# Patient Record
Sex: FEMALE | Race: WHITE | Employment: UNEMPLOYED
[De-identification: names, ages, dates, MRNs, and addresses within clinical notes are randomized per-mention and may not be internally consistent; named-entity substitution may affect disease eponyms.]

---

## 2020-04-11 ENCOUNTER — NURSE TRIAGE (OUTPATIENT)
Dept: OTHER | Facility: CLINIC | Age: 30
End: 2020-04-11

## 2022-03-10 ENCOUNTER — OFFICE VISIT (OUTPATIENT)
Dept: PRIMARY CARE CLINIC | Age: 32
End: 2022-03-10
Payer: MEDICAID

## 2022-03-10 VITALS
HEART RATE: 121 BPM | SYSTOLIC BLOOD PRESSURE: 110 MMHG | HEIGHT: 64 IN | DIASTOLIC BLOOD PRESSURE: 73 MMHG | BODY MASS INDEX: 26.09 KG/M2 | OXYGEN SATURATION: 99 % | TEMPERATURE: 97.3 F

## 2022-03-10 DIAGNOSIS — Z79.899 POLYPHARMACY: ICD-10-CM

## 2022-03-10 DIAGNOSIS — M25.571 CHRONIC PAIN OF RIGHT ANKLE: ICD-10-CM

## 2022-03-10 DIAGNOSIS — G89.29 CHRONIC PAIN OF RIGHT ANKLE: ICD-10-CM

## 2022-03-10 DIAGNOSIS — G89.4 CHRONIC PAIN DISORDER: ICD-10-CM

## 2022-03-10 DIAGNOSIS — J45.20 MILD INTERMITTENT ASTHMA WITHOUT COMPLICATION: ICD-10-CM

## 2022-03-10 DIAGNOSIS — F90.9 ATTENTION DEFICIT HYPERACTIVITY DISORDER (ADHD), UNSPECIFIED ADHD TYPE: Primary | ICD-10-CM

## 2022-03-10 DIAGNOSIS — G40.909 SEIZURE DISORDER (HCC): ICD-10-CM

## 2022-03-10 DIAGNOSIS — F41.9 ANXIETY AND DEPRESSION: ICD-10-CM

## 2022-03-10 DIAGNOSIS — F32.A ANXIETY AND DEPRESSION: ICD-10-CM

## 2022-03-10 PROCEDURE — 99204 OFFICE O/P NEW MOD 45 MIN: CPT | Performed by: FAMILY MEDICINE

## 2022-03-10 RX ORDER — GABAPENTIN 800 MG/1
800 TABLET ORAL 3 TIMES DAILY
Qty: 90 TABLET | Refills: 2 | Status: SHIPPED | OUTPATIENT
Start: 2022-03-10 | End: 2022-06-13

## 2022-03-10 RX ORDER — TIZANIDINE HYDROCHLORIDE 6 MG/1
6 CAPSULE, GELATIN COATED ORAL DAILY PRN
Qty: 30 CAPSULE | Refills: 0 | Status: SHIPPED | OUTPATIENT
Start: 2022-03-10 | End: 2022-03-21

## 2022-03-10 RX ORDER — OXCARBAZEPINE 600 MG/1
600 TABLET, FILM COATED ORAL 2 TIMES DAILY
COMMUNITY
End: 2022-03-10 | Stop reason: SDUPTHER

## 2022-03-10 RX ORDER — HYDROCODONE BITARTRATE AND ACETAMINOPHEN 10; 325 MG/1; MG/1
1 TABLET ORAL 3 TIMES DAILY
COMMUNITY
End: 2022-04-18 | Stop reason: CLARIF

## 2022-03-10 RX ORDER — CITALOPRAM 40 MG/1
10 TABLET ORAL DAILY
COMMUNITY
End: 2022-04-18 | Stop reason: CLARIF

## 2022-03-10 RX ORDER — DEXTROAMPHETAMINE SACCHARATE, AMPHETAMINE ASPARTATE, DEXTROAMPHETAMINE SULFATE AND AMPHETAMINE SULFATE 3.75; 3.75; 3.75; 3.75 MG/1; MG/1; MG/1; MG/1
15 TABLET ORAL DAILY
COMMUNITY
End: 2022-03-21 | Stop reason: SDUPTHER

## 2022-03-10 RX ORDER — TIZANIDINE HYDROCHLORIDE 6 MG/1
6 CAPSULE, GELATIN COATED ORAL 3 TIMES DAILY
COMMUNITY
End: 2022-03-10 | Stop reason: SDUPTHER

## 2022-03-10 RX ORDER — HYDROXYZINE PAMOATE 25 MG/1
50 CAPSULE ORAL 3 TIMES DAILY PRN
COMMUNITY
End: 2022-06-30

## 2022-03-10 RX ORDER — DEXTROAMPHETAMINE SACCHARATE, AMPHETAMINE ASPARTATE MONOHYDRATE, DEXTROAMPHETAMINE SULFATE AND AMPHETAMINE SULFATE 7.5; 7.5; 7.5; 7.5 MG/1; MG/1; MG/1; MG/1
30 CAPSULE, EXTENDED RELEASE ORAL EVERY MORNING
Qty: 30 CAPSULE | Refills: 0 | Status: SHIPPED | OUTPATIENT
Start: 2022-03-10 | End: 2022-05-05

## 2022-03-10 RX ORDER — OXCARBAZEPINE 600 MG/1
600 TABLET, FILM COATED ORAL 2 TIMES DAILY
Qty: 60 TABLET | Refills: 2 | Status: SHIPPED | OUTPATIENT
Start: 2022-03-10 | End: 2022-05-24 | Stop reason: SDUPTHER

## 2022-03-10 RX ORDER — DEXTROAMPHETAMINE SACCHARATE, AMPHETAMINE ASPARTATE MONOHYDRATE, DEXTROAMPHETAMINE SULFATE AND AMPHETAMINE SULFATE 7.5; 7.5; 7.5; 7.5 MG/1; MG/1; MG/1; MG/1
30 CAPSULE, EXTENDED RELEASE ORAL EVERY MORNING
COMMUNITY
End: 2022-03-10 | Stop reason: SDUPTHER

## 2022-03-10 RX ORDER — LORAZEPAM 0.5 MG/1
0.5 TABLET ORAL EVERY 6 HOURS PRN
COMMUNITY
End: 2022-04-18 | Stop reason: CLARIF

## 2022-03-10 RX ORDER — EPINEPHRINE 0.3 MG/.3ML
0.3 INJECTION SUBCUTANEOUS ONCE
Qty: 0.3 ML | Refills: 0 | Status: SHIPPED | OUTPATIENT
Start: 2022-03-10 | End: 2022-06-13

## 2022-03-10 RX ORDER — GABAPENTIN 800 MG/1
800 TABLET ORAL 3 TIMES DAILY
COMMUNITY
End: 2022-03-10 | Stop reason: SDUPTHER

## 2022-03-10 RX ORDER — ALBUTEROL SULFATE 90 UG/1
2 AEROSOL, METERED RESPIRATORY (INHALATION) 4 TIMES DAILY PRN
Qty: 18 G | Refills: 5 | Status: SHIPPED | OUTPATIENT
Start: 2022-03-10

## 2022-03-10 ASSESSMENT — ENCOUNTER SYMPTOMS
COUGH: 0
BACK PAIN: 1

## 2022-03-10 NOTE — PROGRESS NOTES
[address] [facility] [address] PRIMARY CARE  [address] [place] [id number]  Dept: [phone]  Dept Fax: [phone]     3/10/2022      Darrick Hagen   1990     Chief Complaint   Patient presents with    Discuss Medications     NEW PATIENT       HPI    Just moved here from [place] with wife and 4 children. Patient reports a h/o ADHD, ASD, EDS, seizure disorder, depression, anxiety, chronic pain. Is on multiple medications and was following with PM, psych and neurologist.    DID NOT BRING ANY MEDICATION BOTTLES OR MEDICAL RECORDS TO VISIT TODAY. Has Ortho appt tomorrow - ankle surgery in October. \"Did not heal properly. \"     [facility]     - Adderall XR 30 mg QD and Adderall IR 15 mg every afternoon, hydroxyzine 25mg PRN, taking both citalopram 10mg  and sertraline 50mg with Lorazepam PRN. - States she will be out of Adderall on Saturday. Neurologist - [facility]  - dora    PM - [facility] [phone], Fax [phone]. - Hydrocodone (out of this), tizanidine, gabapentin    Also has asthma. Normally takes albuterol and flovent. Has not had these in a long time. Current Outpatient Medications on File Prior to Visit   Medication Sig Dispense Refill    sertraline (ZOLOFT) 50 MG tablet Take 75 mg by mouth daily      HYDROcodone-acetaminophen (NORCO)  MG per tablet Take 1 tablet by mouth 3 times daily.  amphetamine-dextroamphetamine (ADDERALL, 15MG,) 15 MG tablet Take 15 mg by mouth daily. afternoon      citalopram (CELEXA) 40 MG tablet Take 10 mg by mouth daily       hydrOXYzine (VISTARIL) 25 MG capsule Take 25 mg by mouth 3 times daily as needed for Itching      LORazepam (ATIVAN) 0.5 MG tablet Take 0.5 mg by mouth every 6 hours as needed for Anxiety.  PRAZOSIN HCL PO Take by mouth      Cholecalciferol (VITAMIN D3) 44409 UNITS CAPS Take  by mouth.        No current facility-administered medications on file prior to visit. Past Medical History:   Diagnosis Date    ADHD (attention deficit hyperactivity disorder)     Anxiety     Asthma     Autism     Chronic back pain     Depression     Johnie-Danlos syndrome     Seizures (HCC)         Social History     Tobacco Use    Smoking status: Never Smoker    Smokeless tobacco: Never Used   Vaping Use    Vaping Use: Every day    Substances: Nicotine, Flavoring, D-8    Substance Use Topics    Alcohol use: No    Drug use: No        Past Surgical History:   Procedure Laterality Date    ANKLE FRACTURE SURGERY Right     CHOLECYSTECTOMY      EYE SURGERY      WRIST GANGLION EXCISION          Allergies   Allergen Reactions    Azithromycin     Keppra [Levetiracetam]      Extreme rage    Levaquin [Levofloxacin]      Nerve and muscle issues    Pcn [Penicillins] Rash        Family History   Problem Relation Age of Onset    Diabetes Father     High Blood Pressure Father     Heart Disease Father     High Blood Pressure Paternal Grandmother     Diabetes Paternal Grandmother     Stroke Paternal Grandmother     Depression Paternal Grandmother     Dementia Paternal Grandmother     Heart Disease Paternal Grandmother         Patient's past medical history, surgical history, family history, medications, and allergies  were all reviewed and updated as appropriate today. Review of Systems   Constitutional: Negative for fever. Respiratory: Negative for cough. Musculoskeletal: Positive for arthralgias and back pain. /73 (Cuff Size: Medium Adult)   Pulse 121   Temp 97.3 °F (36.3 °C) (Temporal)   Ht 5' 4\" (1.626 m)   LMP 02/15/2022   SpO2 99% Comment: room air  Breastfeeding No   BMI 26.09 kg/m²      Physical Exam  Vitals reviewed. Constitutional:       General: She is not in acute distress. Appearance: Normal appearance. She is well-developed. HENT:      Head: Normocephalic. Right Ear: Tympanic membrane normal.      Left Ear: Tympanic membrane normal.   Eyes:      General: No scleral icterus. Conjunctiva/sclera: Conjunctivae normal.   Neck:      Thyroid: No thyromegaly. Cardiovascular:      Rate and Rhythm: Normal rate and regular rhythm. Heart sounds: No murmur heard. Pulmonary:      Effort: Pulmonary effort is normal. No respiratory distress. Breath sounds: Normal breath sounds. Abdominal:      General: Bowel sounds are normal. There is no distension. Palpations: Abdomen is soft. Tenderness: There is no abdominal tenderness. Musculoskeletal:      Cervical back: Neck supple. Right lower leg: No edema. Left lower leg: No edema. Lymphadenopathy:      Cervical: No cervical adenopathy. Skin:     General: Skin is warm and dry. Capillary Refill: Capillary refill takes less than 2 seconds. Neurological:      General: No focal deficit present. Mental Status: She is alert and oriented to person, place, and time. Mental status is at baseline. Psychiatric:         Mood and Affect: Mood normal.         Assessment:  Encounter Diagnoses   Name Primary?  Attention deficit hyperactivity disorder (ADHD), unspecified ADHD type Yes    Chronic pain disorder     Seizure disorder ([facility] [address]     Mild intermittent asthma without complication     Chronic pain of right ankle     Anxiety and depression     Polypharmacy        Plan:    - > 45 minutes were spent in direct consultation with patient today along with med reconciliation and documentation.   - PATIENT HAS BEEN REMINDED OF OUR OFFICE CONTROLLED SUBSTANCE POLICY. - Unfortunately did not bring any records or med bottles today. Reports she will be out of adderall on Saturday. - Referrals to Neuro, psych, PM have been placed but I have advised patient she will need to actively work with her insurance network on getting these scheduled.    - I have provided 30 days of adderall for a 1 time refill to avoid acute withdrawal.   - I will not be prescribing any narcotic or benzodiazepine medications. - Strongly encouraged patient to actively work on getting her medical records available so they can be available prior to specialist appts. New Prescriptions    ALBUTEROL SULFATE HFA (VENTOLIN HFA) 108 (90 BASE) MCG/ACT INHALER    Inhale 2 puffs into the lungs 4 times daily as needed for Wheezing    EPINEPHRINE (EPIPEN 2-KOREY) 0.3 MG/0.3ML SOAJ INJECTION    Inject 0.3 mLs into the skin once for 1 dose Use as directed for allergic reaction        Orders Placed This Encounter   Procedures   [address]    External Referral To Neurology        Return in about 3 months (around 6/10/2022). 60 total minutes were spent in direct patient care including chart review, face-to-face consultation and documentation.      Malena Serrano,

## 2022-03-18 ENCOUNTER — TELEPHONE (OUTPATIENT)
Dept: ORTHOPEDIC SURGERY | Age: 32
End: 2022-03-18

## 2022-03-18 NOTE — TELEPHONE ENCOUNTER
PA submitted via Replaced by [facility] for tiZANidine HCl 6MG capsules.   (Key: [id number]) - [id number]    STATUS: PENDING

## 2022-03-21 DIAGNOSIS — G89.4 CHRONIC PAIN DISORDER: Primary | ICD-10-CM

## 2022-03-21 DIAGNOSIS — F90.9 ATTENTION DEFICIT HYPERACTIVITY DISORDER (ADHD), UNSPECIFIED ADHD TYPE: Primary | ICD-10-CM

## 2022-03-21 RX ORDER — TIZANIDINE 4 MG/1
4 TABLET ORAL DAILY PRN
Qty: 30 TABLET | Refills: 0 | Status: SHIPPED | OUTPATIENT
Start: 2022-03-21 | End: 2022-04-18 | Stop reason: SDUPTHER

## 2022-03-21 RX ORDER — DEXTROAMPHETAMINE SACCHARATE, AMPHETAMINE ASPARTATE, DEXTROAMPHETAMINE SULFATE AND AMPHETAMINE SULFATE 3.75; 3.75; 3.75; 3.75 MG/1; MG/1; MG/1; MG/1
15 TABLET ORAL DAILY
Qty: 14 TABLET | Refills: 0 | Status: SHIPPED | OUTPATIENT
Start: 2022-03-21 | End: 2022-05-05

## 2022-03-21 RX ORDER — IBUPROFEN 800 MG/1
800 TABLET ORAL 2 TIMES DAILY PRN
Qty: 60 TABLET | Refills: 0 | Status: SHIPPED | OUTPATIENT
Start: 2022-03-21 | End: 2022-04-18

## 2022-03-21 NOTE — PROGRESS NOTES
Patient requesting refills on Adderall and Norco. Dr. Jenny Valencia recently sent in 30 day supply of Adderall- patient not due for refill. Reinforced to patient our office does not prescribe narcotics. Informed Dr. Jenny Valencia is out of office this week. Per patient request, prescribed 800mg Ibuprofen for pain issues. Can address with Dr. Jenny Valencia when she is back in the office.

## 2022-03-21 NOTE — TELEPHONE ENCOUNTER
Pt called back   I told her that I called her to let her know that  is out for the week but we would send her messages. She then says thank you to  for sending in the ER adderall, but she asks for the instant release. She says ER doesn't last her all day and she mentions having sent pics through  [address] [place] [address] [address]. She asks for this to be sent to Dr and provider in office to please look at.

## 2022-03-21 NOTE — TELEPHONE ENCOUNTER
Pt calling back asking for a Neurology referral and psychiatry referral.    Chilo Mello her usually for psychiatry , pt's are to check their insurance and see who is accepting it.

## 2022-03-21 NOTE — TELEPHONE ENCOUNTER
It looks like insurance would like tizanidine 6mg TABLETS sent in for pt vs Capsules.      Last 3/10/22  No next

## 2022-03-21 NOTE — TELEPHONE ENCOUNTER
-Gave referral info  She will call back w fax numbers for these places. She says psychiatry she will call and make sure they take her insurance but she wants this to go to [facility].  -will fax-  -informed her of meds that have been sent and that  is out and this is all that would be sent by provider in office. She says she will looks for as neuro that she can get in with that isn't booked until August but she\" needs someone to take over her med's\". -told her when she gets a fax number to let us know.

## 2022-03-28 DIAGNOSIS — G40.909 SEIZURE DISORDER (HCC): ICD-10-CM

## 2022-03-28 DIAGNOSIS — R53.83 FATIGUE, UNSPECIFIED TYPE: Primary | ICD-10-CM

## 2022-04-01 ENCOUNTER — OFFICE VISIT (OUTPATIENT)
Dept: ORTHOPEDIC SURGERY | Age: 32
End: 2022-04-01
Payer: MEDICAID

## 2022-04-01 DIAGNOSIS — G40.909 SEIZURE DISORDER (HCC): ICD-10-CM

## 2022-04-01 DIAGNOSIS — R53.83 FATIGUE, UNSPECIFIED TYPE: ICD-10-CM

## 2022-04-01 DIAGNOSIS — Q79.60 EHLERS-DANLOS SYNDROME: Primary | ICD-10-CM

## 2022-04-01 DIAGNOSIS — Z98.890 S/P ANKLE LIGAMENT REPAIR: ICD-10-CM

## 2022-04-01 PROCEDURE — 99203 OFFICE O/P NEW LOW 30 MIN: CPT | Performed by: ORTHOPAEDIC SURGERY

## 2022-04-01 NOTE — PROGRESS NOTES
[facility]  Outpatient Progress Note  [employer] MIKE Vera MD    Patient Name: Pollo Roberts MRN: [id number]   Age: 33 y.o. YOB: 1990   Sex: female      [address] Complaint   Patient presents with    Ankle Pain     Rt ankle previous injury 2021  states surgery on ligaments 10/2021 now has constant pain aselling lateral ankle, tall walker boot, no recent imaging        HISTORY OF PRESENT ILLNESS   Pollo Roberts is a 33 y.o. female presents the office today for evaluation of her right ankle. The patient tells me that in May 2021 she had an inversion injury to her right ankle and was diagnosed with a sprain which was initially treated with the appropriate modalities including rest ice elevation and immobilization but she failed to improve. At the time she was living in [place]. She was treated by an orthopedic surgeon there. She was indicated for surgery. I do not have any medical records for review but by patient report and by examining her surgical incisions it appears she had ankle arthroscopy and a Broström type repair. She tells me following the surgery she was immobilized in a splint and then a cast and then was transition to a removable walking cast boot. Surgery took place in October 2021. She tells me she last saw her surgeon in February 2022 and at that time he had advised her to continue in the walking cast boot. In the meantime she has moved to [place]. She continues to have discomfort and swelling in the ankle and presented to the office today for further treatment recommendations. 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 I have no other medical records for review.     Pain Assessment  Location of Pain: Ankle  Location Modifiers: Right,Lateral  Severity of Pain: 7  Quality of Pain: Sharp,Dull,Aching  Duration of Pain: Persistent  Frequency of Pain: Intermittent  Aggravating Factors: Walking,Exercise  Limiting Behavior: Yes  Relieving Factors: Rest,Nsaids    PAST MEDICAL HISTORY      Past Medical History:   Diagnosis Date    ADHD (attention deficit hyperactivity disorder)     Anxiety     Asthma     Autism     Chronic back pain     Depression     Johnie-Danlos syndrome     Seizures (HCC)        PAST SURGICAL HISTORY     Past Surgical History:   Procedure Laterality Date    ANKLE FRACTURE SURGERY Right     CHOLECYSTECTOMY      EYE SURGERY      WRIST GANGLION EXCISION         MEDICATIONS     Current Outpatient Medications   Medication Sig Dispense Refill    tiZANidine (ZANAFLEX) 4 MG tablet Take 1 tablet by mouth daily as needed (muscle spasms) 30 tablet 0    ibuprofen (ADVIL;MOTRIN) 800 MG tablet Take 1 tablet by mouth 2 times daily as needed for Pain 60 tablet 0    amphetamine-dextroamphetamine (ADDERALL, 15MG,) 15 MG tablet Take 1 tablet by mouth daily for 14 days. afternoon 14 tablet 0    sertraline (ZOLOFT) 50 MG tablet Take 75 mg by mouth daily      HYDROcodone-acetaminophen (NORCO)  MG per tablet Take 1 tablet by mouth 3 times daily.  citalopram (CELEXA) 40 MG tablet Take 10 mg by mouth daily       hydrOXYzine (VISTARIL) 25 MG capsule Take 50 mg by mouth 3 times daily as needed for Itching       amphetamine-dextroamphetamine (ADDERALL XR) 30 MG extended release capsule Take 1 capsule by mouth every morning for 30 days. 30 capsule 0    gabapentin (NEURONTIN) 800 MG tablet Take 1 tablet by mouth 3 times daily for 30 days. 90 tablet 2    PRAZOSIN HCL PO Take by mouth      OXcarbazepine (TRILEPTAL) 600 MG tablet Take 1 tablet by mouth 2 times daily 60 tablet 2    albuterol sulfate HFA (VENTOLIN HFA) 108 (90 Base) MCG/ACT inhaler Inhale 2 puffs into the lungs 4 times daily as needed for Wheezing 18 g 5    LORazepam (ATIVAN) 0.5 MG tablet Take 0.5 mg by mouth every 6 hours as needed for Anxiety.  (Patient not taking: Reported on 4/1/2022)      EPINEPHrine (EPIPEN 2-KOREY) 0.3 MG/0.3ML SOAJ injection Inject 0.3 mLs into the skin once for 1 dose Use as directed for allergic reaction 0.3 mL 0    Cholecalciferol (VITAMIN D3) 71740 UNITS CAPS Take  by mouth. (Patient not taking: Reported on 4/1/2022)       No current facility-administered medications for this visit. ALLERGIES     Allergies   Allergen Reactions    Azithromycin     Keppra [Levetiracetam]      Extreme rage    Levaquin [Levofloxacin]      Nerve and muscle issues    Wellbutrin [Bupropion] Other (See Comments)     Suicidal ideation    Pcn [Penicillins] Rash       FAMILY HISTORY     Family History   Problem Relation Age of Onset    Diabetes Father     High Blood Pressure Father     Heart Disease Father     High Blood Pressure Paternal Grandmother     Diabetes Paternal Grandmother     Stroke Paternal Grandmother     Depression Paternal Grandmother     Dementia Paternal Grandmother     Heart Disease Paternal Grandmother        SOCIAL HISTORY     Social History     Socioeconomic History    Marital status:      Spouse name: Not on file    Number of children: Not on file    Years of education: Not on file    Highest education level: Not on file   Occupational History    Not on file   Tobacco Use    Smoking status: Never Smoker    Smokeless tobacco: Never Used   Vaping Use    Vaping Use: Every day    Substances: Nicotine, Flavoring, D-8    Substance and Sexual Activity    Alcohol use: No    Drug use: No    Sexual activity: Yes     Partners: Male   Other Topics Concern    Not on file   Social History Narrative    Not on file     Social Determinants of Health     Financial Resource Strain:     Difficulty of Paying Living Expenses: Not on file   Food Insecurity:     Worried About Running Out of Food in the Last Year: Not on file    Joaquín of Food in the Last Year: Not on file   Transportation Needs:     Lack of Transportation (Medical): Not on file    Lack of Transportation (Non-Medical):  Not on file   Physical Activity:     Days of Exercise per Week: Not on file    Minutes of Exercise per Session: Not on file   Stress:     Feeling of Stress : Not on file   Social Connections:     Frequency of Communication with Friends and Family: Not on file    Frequency of Social Gatherings with Friends and Family: Not on file    Attends Yarsanism Services: Not on file    Active Member of [address] [place] [address] or Organizations: Not on file    Attends Club or Organization Meetings: Not on file    Marital Status: Not on file   Intimate Partner Violence:     Fear of Current or Ex-Partner: Not on file    Emotionally Abused: Not on file    Physically Abused: Not on file    Sexually Abused: Not on file   Housing Stability:     Unable to Pay for Housing in the Last Year: Not on file    Number of [place] in the Last Year: Not on file    Unstable Housing in the Last Year: Not on file       REVIEW OF SYSTEMS   General: no fever, chills, night sweats, anorexia, malaise, fatigue, or weight change  Hematologic:  no unexplained bleeding or bruising  HEENT:   no nasal congestion, rhinorrhea, sore throat, or facial pain  Respiratory:  no cough, dyspnea, or chest pain  Cardiovascular:  no angina, LOVETT, PND, orthopnea, dependent edema, or palpitations  Gastrointestinal:  no nausea, vomiting, diarrhea, constipation, or abdominal pain  Genitourinary:  no urinary urgency, frequency, dysuria, or hematuria  Musculoskeletal: see HPI  Endocrine:  no heat or cold intolerance and no polyphagia, polydipsia, or polyuria  Skin:  no skin eruptions or changing lesions  Neurologic:  no focal weakness, numbness/tingling, tremor, or severe headache. See HPI. See HPI for pertinent positives. PHYSICAL EXAM   Vital Signs: There were no vitals filed for this visit. General appearance: healthy, alert, no distress  Skin: Skin color, texture, turgor normal. No rashes or lesions  HEENT: atraumatic, normocephalic.  PERRL  Respiratory: Unlabored breathing  Lymphatic: No adenopathy   Neuro: Alert and oriented, normal distal sensation, normal bilateral DTRs  Vascular: Normal distal capillary and distal pulses  Muskuloskeletal Exam: Right ankle examination demonstrates no significant soft tissue swelling or edema. She has well-healed anterior arthroscopic portals in the ankle. There is a small incision in the posterior lateral ankle over the peroneal tendon sheath and another small incision over the distal aspect of the fibula with a typical Broström type incision. Her range of motion is full. There is no laxity in the ankle with anterior drawer varus stress testing. Weightbearing alignment of the ankle is normal.  Her gait appears normal.    RADIOLOGY   X-rays obtained and reviewed in office:  Views right ankle 3 views right foot 3 views    Impression: No acute bony abnormality. There is a bone tunnel in the distal fibula consistent with a Broström type repair    IMPRESSION     1. Johnie-Danlos syndrome    2. S/P ankle ligament repair         PLAN   I had a lengthy discussion with patient today regarding diagnosis and treatment options and recommendations. At this time I have advised the patient to enroll in a course of supervised physical therapy. I think she has been inadequately rehabilitated following her surgery. In addition however I have strongly encouraged her to obtain her medical records for further review so that I can be sure of the pathology that was treated at the time of the surgery and the specific procedure performed. FOLLOWUP     Return in about 6 weeks (around 5/13/2022) for Reevaluation.     Orders Placed This Encounter   Procedures    XR ANKLE RIGHT (MIN 3 VIEWS)     Standing Status:   Future     Number of Occurrences:   1     Standing Expiration Date:   4/1/2023     Order Specific Question:   Reason for exam:     Answer:   pain    XR FOOT RIGHT (MIN 3 VIEWS)     Standing Status:   Future     Number of Occurrences:   1     Standing Expiration Date:   4/1/2023     Order Specific Question:   Reason for exam:     Answer:   pain    [facility]     Referral Priority:   Routine     Referral Type:   Eval and Treat     Referral Reason:   Specialty Services Required     Requested Specialty:   Physical Therapy     Number of Visits Requested:   1      No orders of the defined types were placed in this encounter.       Patient was instructed on appropriate use of braces, participation in home exercise programs, healthy lifestyle choices and weight loss as appropriate     Luis Felipe Pérez MD

## 2022-04-02 LAB
BASOPHILS ABSOLUTE: 0 K/UL (ref 0–0.2)
BASOPHILS RELATIVE PERCENT: 0.5 %
EOSINOPHILS ABSOLUTE: 0.1 K/UL (ref 0–0.6)
EOSINOPHILS RELATIVE PERCENT: 1.4 %
HCT VFR BLD CALC: 36.6 % (ref 36–48)
HEMOGLOBIN: 11.9 G/DL (ref 12–16)
IRON SATURATION: 9 % (ref 15–50)
IRON: 27 UG/DL (ref 37–145)
LYMPHOCYTES ABSOLUTE: 2 K/UL (ref 1–5.1)
LYMPHOCYTES RELATIVE PERCENT: 25.3 %
MCH RBC QN AUTO: 27.3 PG (ref 26–34)
MCHC RBC AUTO-ENTMCNC: 32.4 G/DL (ref 31–36)
MCV RBC AUTO: 84.1 FL (ref 80–100)
MONOCYTES ABSOLUTE: 0.4 K/UL (ref 0–1.3)
MONOCYTES RELATIVE PERCENT: 5.2 %
NEUTROPHILS ABSOLUTE: 5.4 K/UL (ref 1.7–7.7)
NEUTROPHILS RELATIVE PERCENT: 67.6 %
PDW BLD-RTO: 13.9 % (ref 12.4–15.4)
PLATELET # BLD: 313 K/UL (ref 135–450)
PMV BLD AUTO: 8.8 FL (ref 5–10.5)
RBC # BLD: 4.35 M/UL (ref 4–5.2)
TOTAL IRON BINDING CAPACITY: 287 UG/DL (ref 260–445)
WBC # BLD: 8 K/UL (ref 4–11)

## 2022-04-12 LAB — OXCARBAZEPINE: 16 UG/ML (ref 3–35)

## 2022-04-13 NOTE — RESULT ENCOUNTER NOTE
Labs indicate normal trileptal level. Your are mildly deficient of iron.  I would suggest a daily multivitamin with iron in it to support these levels. - Dr. Murleen Koyanagi

## 2022-04-16 ENCOUNTER — HOSPITAL ENCOUNTER (EMERGENCY)
Age: 32
Discharge: HOME | End: 2022-04-16
Payer: MEDICAID

## 2022-04-16 VITALS
HEART RATE: 88 BPM | HEIGHT: 64 IN | WEIGHT: 185 LBS | BODY MASS INDEX: 31.58 KG/M2 | DIASTOLIC BLOOD PRESSURE: 81 MMHG | TEMPERATURE: 98.1 F | SYSTOLIC BLOOD PRESSURE: 114 MMHG | RESPIRATION RATE: 13 BRPM | OXYGEN SATURATION: 100 %

## 2022-04-16 DIAGNOSIS — T78.2XXA ANAPHYLAXIS, INITIAL ENCOUNTER: Primary | ICD-10-CM

## 2022-04-16 PROCEDURE — 2500000003 HC RX 250 WO HCPCS: Performed by: PHYSICIAN ASSISTANT

## 2022-04-16 PROCEDURE — 96375 TX/PRO/DX INJ NEW DRUG ADDON: CPT

## 2022-04-16 PROCEDURE — 2580000003 HC RX 258: Performed by: PHYSICIAN ASSISTANT

## 2022-04-16 PROCEDURE — 99284 EMERGENCY DEPT VISIT MOD MDM: CPT

## 2022-04-16 PROCEDURE — 6360000002 HC RX W HCPCS: Performed by: PHYSICIAN ASSISTANT

## 2022-04-16 PROCEDURE — 96374 THER/PROPH/DIAG INJ IV PUSH: CPT

## 2022-04-16 PROCEDURE — 96372 THER/PROPH/DIAG INJ SC/IM: CPT

## 2022-04-16 RX ORDER — FAMOTIDINE 20 MG/1
20 TABLET, FILM COATED ORAL 2 TIMES DAILY
Qty: 60 TABLET | Refills: 3 | Status: SHIPPED | OUTPATIENT
Start: 2022-04-16 | End: 2022-06-29

## 2022-04-16 RX ORDER — 0.9 % SODIUM CHLORIDE 0.9 %
1000 INTRAVENOUS SOLUTION INTRAVENOUS ONCE
Status: COMPLETED | OUTPATIENT
Start: 2022-04-16 | End: 2022-04-16

## 2022-04-16 RX ORDER — FAMOTIDINE 20 MG/1
20 TABLET, FILM COATED ORAL 2 TIMES DAILY
Qty: 60 TABLET | Refills: 3 | Status: SHIPPED | OUTPATIENT
Start: 2022-04-16 | End: 2022-04-16 | Stop reason: SDUPTHER

## 2022-04-16 RX ORDER — METHYLPREDNISOLONE SODIUM SUCCINATE 125 MG/2ML
125 INJECTION, POWDER, LYOPHILIZED, FOR SOLUTION INTRAMUSCULAR; INTRAVENOUS ONCE
Status: COMPLETED | OUTPATIENT
Start: 2022-04-16 | End: 2022-04-16

## 2022-04-16 RX ORDER — PREDNISONE 20 MG/1
60 TABLET ORAL DAILY
Qty: 15 TABLET | Refills: 0 | Status: SHIPPED | OUTPATIENT
Start: 2022-04-16 | End: 2022-04-21

## 2022-04-16 RX ORDER — EPINEPHRINE 1 MG/ML
0.3 INJECTION, SOLUTION, CONCENTRATE INTRAVENOUS ONCE
Status: COMPLETED | OUTPATIENT
Start: 2022-04-16 | End: 2022-04-16

## 2022-04-16 RX ORDER — PREDNISONE 20 MG/1
60 TABLET ORAL DAILY
Qty: 15 TABLET | Refills: 0 | Status: SHIPPED | OUTPATIENT
Start: 2022-04-16 | End: 2022-04-16 | Stop reason: SDUPTHER

## 2022-04-16 RX ADMIN — METHYLPREDNISOLONE SODIUM SUCCINATE 125 MG: 125 INJECTION, POWDER, FOR SOLUTION INTRAMUSCULAR; INTRAVENOUS at 19:21

## 2022-04-16 RX ADMIN — SODIUM CHLORIDE 1000 ML: 9 INJECTION, SOLUTION INTRAVENOUS at 19:26

## 2022-04-16 RX ADMIN — EPINEPHRINE 0.3 MG: 1 INJECTION, SOLUTION, CONCENTRATE INTRAVENOUS at 19:22

## 2022-04-16 RX ADMIN — FAMOTIDINE 20 MG: 10 INJECTION, SOLUTION INTRAVENOUS at 19:22

## 2022-04-16 NOTE — ED NOTES
Pt reports takinmg Benedryl PO  3 DUOnebs/albuterol total   Denies Epi-pen administration (has one, did not use because I live 3 minutes from here and I have to come here anyway afterr I give it so I wanted to keep it for when I am further away from home\"     Laney Engle RN  [phone]

## 2022-04-17 DIAGNOSIS — G89.4 CHRONIC PAIN DISORDER: ICD-10-CM

## 2022-04-17 NOTE — ED PROVIDER NOTES
[facility] Emergency Department    CHIEF COMPLAINT  Allergic Reaction (\"tree nuts\"; \"bought some candy with tree nuts in it for family, spilled and states possibly brushed them against my face\"; pt not showing any acute respiratory signs or symptoms of distress 100% O2; pt reports feeling \"a rash coming on\")      SHARED SERVICE VISIT  Evaluated by DORA. My supervising physician was available for consultation. HISTORY OF PRESENT ILLNESS  Minerva Sanderson is a 33 y.o. female who presents to the ED complaining of several hour history of suspected allergic reaction. Patient states that she has an allergy to tree nuts. Reports that she brought her mother a [employer] gift containing some knots which then spilled into her bag. She reports that she was cleaning that out and states that she did not necessarily realize that she had touched her face or anything although began to experience some facial discomfort swelling with throat tightness as well as some shortness of breath. Patient took some Benadryl at home as well as DuoNeb and reports that symptoms persisted. She has been prescribed EpiPen although did not use it stating that she was only 3 minutes away from our facility. She comes in for further evaluation. There has been no vomiting. Reports that she did noticed mild rash to chest..  No headaches, dizziness confusion. No other complaints, modifying factors or associated symptoms. Nursing notes reviewed.    Past Medical History:   Diagnosis Date    ADHD (attention deficit hyperactivity disorder)     Anxiety     Asthma     Autism     Chronic back pain     Depression     Johnie-Danlos syndrome     Seizures (HCC)      Past Surgical History:   Procedure Laterality Date    ANKLE FRACTURE SURGERY Right     CHOLECYSTECTOMY      EYE SURGERY      WRIST GANGLION EXCISION       Family History   Problem Relation Age of Onset    Diabetes Father     High Blood Pressure Father     Heart Disease Father     High Blood Pressure Paternal Grandmother     Diabetes Paternal Grandmother     Stroke Paternal Grandmother     Depression Paternal Grandmother     Dementia Paternal Grandmother     Heart Disease Paternal Grandmother      Social History     Socioeconomic History    Marital status:      Spouse name: Not on file    Number of children: Not on file    Years of education: Not on file    Highest education level: Not on file   Occupational History    Not on file   Tobacco Use    Smoking status: Never Smoker    Smokeless tobacco: Never Used   Vaping Use    Vaping Use: Every day    Substances: Nicotine, Flavoring, D-8    Substance and Sexual Activity    Alcohol use: No    Drug use: No    Sexual activity: Yes     Partners: Male   Other Topics Concern    Not on file   Social History Narrative    Not on file     Social Determinants of Health     Financial Resource Strain:     Difficulty of Paying Living Expenses: Not on file   Food Insecurity:     Worried About Running Out of Food in the Last Year: Not on file    Joaquín of Food in the Last Year: Not on file   Transportation Needs:     Lack of Transportation (Medical): Not on file    Lack of Transportation (Non-Medical):  Not on file   Physical Activity:     Days of Exercise per Week: Not on file    Minutes of Exercise per Session: Not on file   Stress:     Feeling of Stress : Not on file   Social Connections:     Frequency of Communication with Friends and Family: Not on file    Frequency of Social Gatherings with Friends and Family: Not on file    Attends Voodoo Services: Not on file    Active Member of Clubs or Organizations: Not on file    Attends Club or Organization Meetings: Not on file    Marital Status: Not on file   Intimate Partner Violence:     Fear of Current or Ex-Partner: Not on file    Emotionally Abused: Not on file    Physically Abused: Not on file    Sexually Abused: Not on file   Housing Stability:     Unable to Pay for Housing in the Last Year: Not on file    Number of Places Lived in the Last Year: Not on file    Unstable Housing in the Last Year: Not on file     No current facility-administered medications for this encounter. Current Outpatient Medications   Medication Sig Dispense Refill    famotidine (PEPCID) 20 MG tablet Take 1 tablet by mouth 2 times daily 60 tablet 3    predniSONE (DELTASONE) 20 MG tablet Take 3 tablets by mouth daily for 5 days 15 tablet 0    tiZANidine (ZANAFLEX) 4 MG tablet Take 1 tablet by mouth daily as needed (muscle spasms) 30 tablet 0    ibuprofen (ADVIL;MOTRIN) 800 MG tablet Take 1 tablet by mouth 2 times daily as needed for Pain 60 tablet 0    amphetamine-dextroamphetamine (ADDERALL, 15MG,) 15 MG tablet Take 1 tablet by mouth daily for 14 days. afternoon 14 tablet 0    sertraline (ZOLOFT) 50 MG tablet Take 75 mg by mouth daily      HYDROcodone-acetaminophen (NORCO)  MG per tablet Take 1 tablet by mouth 3 times daily.  citalopram (CELEXA) 40 MG tablet Take 10 mg by mouth daily       hydrOXYzine (VISTARIL) 25 MG capsule Take 50 mg by mouth 3 times daily as needed for Itching       amphetamine-dextroamphetamine (ADDERALL XR) 30 MG extended release capsule Take 1 capsule by mouth every morning for 30 days. 30 capsule 0    gabapentin (NEURONTIN) 800 MG tablet Take 1 tablet by mouth 3 times daily for 30 days. 90 tablet 2    LORazepam (ATIVAN) 0.5 MG tablet Take 0.5 mg by mouth every 6 hours as needed for Anxiety.  (Patient not taking: Reported on 4/1/2022)      PRAZOSIN HCL PO Take by mouth      OXcarbazepine (TRILEPTAL) 600 MG tablet Take 1 tablet by mouth 2 times daily 60 tablet 2    albuterol sulfate HFA (VENTOLIN HFA) 108 (90 Base) MCG/ACT inhaler Inhale 2 puffs into the lungs 4 times daily as needed for Wheezing 18 g 5    EPINEPHrine (EPIPEN 2-KOREY) 0.3 MG/0.3ML SOAJ injection Inject 0.3 mLs into the skin once for 1 dose Use as directed for allergic reaction 0.3 mL 0    Cholecalciferol (VITAMIN D3) 73330 UNITS CAPS Take  by mouth. (Patient not taking: Reported on 4/1/2022)       Allergies   Allergen Reactions    Tree Nut [Macadamia Nut Oil] Hives and Itching    Azithromycin     Keppra [Levetiracetam]      Extreme rage    Levaquin [Levofloxacin]      Nerve and muscle issues    Wellbutrin [Bupropion] Other (See Comments)     Suicidal ideation    Pcn [Penicillins] Rash       REVIEW OF SYSTEMS  10 systems reviewed, pertinent positives per HPI otherwise noted to be negative    PHYSICAL EXAM  /81   Pulse 88   Temp 98.1 °F (36.7 °C) (Oral)   Resp 13   Ht 5' 4\" (1.626 m)   Wt 185 lb (83.9 kg)   LMP 04/15/2022   SpO2 100%   BMI 31.76 kg/m²   GENERAL APPEARANCE: Awake and alert. Cooperative. No acute distress. HEAD: Normocephalic. Atraumatic. EYES: PERRL. EOM's grossly intact. ENT: Mucous membranes are moist.  No obvious lip or tongue swelling. No vesicles, blistering or sloughing. Oropharynx patent. Patient controlling secretions and floor the mouth soft and nonraised. NECK: Supple. No tracheal tenderness or deviation. No crepitus. HEART: RRR. No murmurs. No chest wall tenderness. LUNGS: Respirations unlabored. CTAB. Good air exchange. Speaking comfortably in full sentences. Occasional expiratory wheezing. No obvious rhonchi or rales. ABDOMEN: Soft. Non-distended. Non-tender. No guarding or rebound. EXTREMITIES: No peripheral edema. Moves all extremities equally. All extremities neurovascularly intact. SKIN: Warm and dry. No acute rashes. NEUROLOGICAL: Alert and oriented. CN's 2-12 intact. No gross facial drooping. Strength 5/5, sensation intact. PSYCHIATRIC: Normal mood and affect. ED COURSE  Patient received IM epi, Pepcid, and Solu-Medrol for suspected anaphylaxis, with good relief. Triage vitals stable. Given a 1 L IV fluid bolus.   Patient was observed here in the emergency department after medication administration with improvement of symptomology. On reevaluation she is feeling better and comfortable with discharge home. Will discharge with steroids and Pepcid. Patient will continue use of Benadryl at home as well as use of EpiPen as needed. We have discussed return precautions as well as recommendations for follow-up otherwise and she is in agreement and comfortable at discharge. A discussion was had with Ms. Reveles regarding allergic reaction, ED findings and recommendations for follow-up. Risk management discussed and shared decision making had with patient and/or surrogate. All questions were answered. Patient will follow up with PCP in 2 to 3 days for further evaluation/treatment. All questions answered. Patient will return to ED for new/worsening symptoms. Patient was sent home with a prescription for Pepcid and prednisone. CRITICAL CARE TIME  30 Minutes of critical care time spent not including separately billable procedures. MDM  I estimate there is LOW risk for a ANAPHYLAXIS, DEEP SPACE INFECTION (e.g., JUJUS ANGINA OR RETROPHARYNGEAL ABSCESS), EPIGLOTTITIS, MENINGITIS, or AIRWAY COMPROMISE, thus I consider the discharge disposition reasonable. Also, there is no evidence or peritonitis, sepsis, or toxicity. Manuel Gorman and I have discussed the diagnosis and risks, and we agree with discharging home to follow-up with their primary doctor. We also discussed returning to the Emergency Department immediately if new or worsening symptoms occur. We have discussed the symptoms which are most concerning (e.g., changing or worsening pain, trouble swallowing or breathing, neck stiffness or fever) that necessitate immediate return. Final Impression  1. Anaphylaxis, initial encounter      Discharge Vital Signs:  Blood pressure 114/81, pulse 88, temperature 98.1 °F (36.7 °C), temperature source Oral, resp.  rate 13, height 5' 4\" (1.626 m), weight 185 lb (83.9 kg), last menstrual period 04/15/2022, SpO2 100 %, not currently breastfeeding. DISPOSITION  Patient was discharged to home in good condition.           Aaron Moraes  04/17/22 1105

## 2022-04-18 ENCOUNTER — TELEMEDICINE (OUTPATIENT)
Dept: PRIMARY CARE CLINIC | Age: 32
End: 2022-04-18
Payer: MEDICAID

## 2022-04-18 DIAGNOSIS — J45.20 MILD INTERMITTENT ASTHMA WITHOUT COMPLICATION: Primary | ICD-10-CM

## 2022-04-18 DIAGNOSIS — Q79.60 EHLERS-DANLOS SYNDROME: ICD-10-CM

## 2022-04-18 DIAGNOSIS — Z91.018 TREE NUT ALLERGY: ICD-10-CM

## 2022-04-18 DIAGNOSIS — G89.4 CHRONIC PAIN DISORDER: ICD-10-CM

## 2022-04-18 PROCEDURE — 99213 OFFICE O/P EST LOW 20 MIN: CPT | Performed by: FAMILY MEDICINE

## 2022-04-18 RX ORDER — DEXTROAMPHETAMINE SACCHARATE, AMPHETAMINE ASPARTATE, DEXTROAMPHETAMINE SULFATE AND AMPHETAMINE SULFATE 3.75; 3.75; 3.75; 3.75 MG/1; MG/1; MG/1; MG/1
15 TABLET ORAL 2 TIMES DAILY
COMMUNITY

## 2022-04-18 RX ORDER — BUDESONIDE AND FORMOTEROL FUMARATE DIHYDRATE 80; 4.5 UG/1; UG/1
2 AEROSOL RESPIRATORY (INHALATION) 2 TIMES DAILY
Qty: 10.2 G | Refills: 3 | Status: SHIPPED | OUTPATIENT
Start: 2022-04-18

## 2022-04-18 RX ORDER — CITALOPRAM 10 MG/1
10 TABLET ORAL DAILY
COMMUNITY
End: 2022-05-23

## 2022-04-18 RX ORDER — IBUPROFEN 800 MG/1
TABLET ORAL
Qty: 60 TABLET | Refills: 0 | Status: SHIPPED | OUTPATIENT
Start: 2022-04-18 | End: 2022-06-30

## 2022-04-18 RX ORDER — DEXTROAMPHETAMINE SACCHARATE, AMPHETAMINE ASPARTATE MONOHYDRATE, DEXTROAMPHETAMINE SULFATE AND AMPHETAMINE SULFATE 7.5; 7.5; 7.5; 7.5 MG/1; MG/1; MG/1; MG/1
30 CAPSULE, EXTENDED RELEASE ORAL EVERY MORNING
COMMUNITY

## 2022-04-18 RX ORDER — TIZANIDINE 4 MG/1
4 TABLET ORAL DAILY PRN
Qty: 30 TABLET | Refills: 0 | Status: SHIPPED | OUTPATIENT
Start: 2022-04-18 | End: 2022-05-24 | Stop reason: SDUPTHER

## 2022-04-18 RX ORDER — ESCITALOPRAM OXALATE 10 MG/1
10 TABLET ORAL DAILY
COMMUNITY
End: 2022-05-23

## 2022-04-18 ASSESSMENT — ENCOUNTER SYMPTOMS
COUGH: 1
WHEEZING: 1

## 2022-04-18 NOTE — PROGRESS NOTES
hydrOXYzine (VISTARIL) 25 MG capsule Take 50 mg by mouth 3 times daily as needed for Itching   Historical Provider, MD   amphetamine-dextroamphetamine (ADDERALL XR) 30 MG extended release capsule Take 1 capsule by mouth every morning for 30 days. Connor Nuñez, DO   gabapentin (NEURONTIN) 800 MG tablet Take 1 tablet by mouth 3 times daily for 30 days. Naheed Nuñez DO   LORazepam (ATIVAN) 0.5 MG tablet Take 0.5 mg by mouth every 6 hours as needed for Anxiety. Patient not taking: Reported on 4/1/2022  Historical Provider, MD   PRAZOSIN HCL PO Take by mouth  Historical Provider, MD   OXcarbazepine (TRILEPTAL) 600 MG tablet Take 1 tablet by mouth 2 times daily  Naheed Nuñez DO   albuterol sulfate HFA (VENTOLIN HFA) 108 (90 Base) MCG/ACT inhaler Inhale 2 puffs into the lungs 4 times daily as needed for Wheezing  Naheed Bach DO   EPINEPHrine (EPIPEN 2-KOREY) 0.3 MG/0.3ML SOAJ injection Inject 0.3 mLs into the skin once for 1 dose Use as directed for allergic reaction  Naheed Nuñez DO   Cholecalciferol (VITAMIN D3) 22121 UNITS CAPS Take  by mouth.   Patient not taking: Reported on 4/1/2022  Historical Provider, MD       Past Medical History:   Diagnosis Date    ADHD (attention deficit hyperactivity disorder)     Anxiety     Asthma     Autism     Chronic back pain     Depression     Johnie-Danlos syndrome     Seizures ([address]         Social History     Tobacco Use    Smoking status: Never Smoker    Smokeless tobacco: Never Used   Vaping Use    Vaping Use: Every day    Substances: Nicotine, Flavoring, D-8    Substance Use Topics    Alcohol use: No    Drug use: No        Past Surgical History:   Procedure Laterality Date    ANKLE FRACTURE SURGERY Right     CHOLECYSTECTOMY      EYE SURGERY      WRIST GANGLION EXCISION          Allergies   Allergen Reactions    Tree Nut [Macadamia Nut Oil] Hives and Itching    Azithromycin     Keppra [Levetiracetam]      Extreme rage    Levaquin [Levofloxacin]      Nerve and muscle issues    Wellbutrin [Bupropion] Other (See Comments)     Suicidal ideation    Pcn [Penicillins] Rash        Family History   Problem Relation Age of Onset    Diabetes Father     High Blood Pressure Father     Heart Disease Father     High Blood Pressure Paternal Grandmother     Diabetes Paternal Grandmother     Stroke Paternal Grandmother     Depression Paternal Grandmother     Dementia Paternal Grandmother     Heart Disease Paternal Grandmother         Patient's past medical history, surgical history, family history, medications, and allergies  were all reviewed and updated as appropriate today. Review of Systems   Constitutional: Negative for fever. Respiratory: Positive for cough and wheezing. Musculoskeletal: Positive for arthralgias and myalgias. Vitals unable to obtain and physical exam is limited 2/2 virtual visit nature of this encounter. Physical Exam  Constitutional:       General: She is not in acute distress. Appearance: Normal appearance. She is not ill-appearing. Pulmonary:      Effort: Pulmonary effort is normal.   Neurological:      General: No focal deficit present. Mental Status: She is alert. Psychiatric:         Mood and Affect: Mood normal.         Assessment:  Encounter Diagnoses   Name Primary?  Mild intermittent asthma without complication Yes    Tree nut allergy     Chronic pain disorder     Johnie-Danlos syndrome        Plan:    - Asthma. Uncontrolled. Add symbicort. - Epi pen available if needed for known tree nut allergy. - Chronic pain. Will not be prescribing hydrocodone. Okay to fill tizanidine temporarily until can be seen with PM. Gabapentin has refills available. No opioids.        New Prescriptions    BUDESONIDE-FORMOTEROL (SYMBICORT) 80-4.5 MCG/ACT AERO    Inhale 2 puffs into the lungs 2 times daily        No orders of the defined types were placed in this encounter. Return if symptoms worsen or fail to improve. Alie Hager DO     Migue Garcia is a 33 y.o. female being evaluated by a Virtual Visit (video visit) encounter to address concerns as mentioned above. A caregiver was present when appropriate. Due to this being a TeleHealth encounter (During ESKOD-10 public health emergency), evaluation of the following organ systems was limited: Vitals/Constitutional/EENT/Resp/CV/GI//MS/Neuro/Skin/Heme-Lymph-Imm. Pursuant to the emergency declaration under the [address] [place] [address], [address] [place] [address] authority and the [employer] and [employer] Act, this Virtual Visit was conducted with patient's (and/or legal guardian's) consent, to reduce the patient's risk of exposure to COVID-19 and provide necessary medical care. The patient (and/or legal guardian) has also been advised to contact this office for worsening conditions or problems, and seek emergency medical treatment and/or call 911 if deemed necessary.

## 2022-04-25 ENCOUNTER — APPOINTMENT (OUTPATIENT)
Dept: GENERAL RADIOLOGY | Age: 32
End: 2022-04-25
Payer: MEDICAID

## 2022-04-25 ENCOUNTER — HOSPITAL ENCOUNTER (EMERGENCY)
Age: 32
Discharge: HOME | End: 2022-04-25
Attending: EMERGENCY MEDICINE
Payer: MEDICAID

## 2022-04-25 VITALS
RESPIRATION RATE: 21 BRPM | HEART RATE: 100 BPM | SYSTOLIC BLOOD PRESSURE: 114 MMHG | OXYGEN SATURATION: 100 % | WEIGHT: 200 LBS | HEIGHT: 64 IN | DIASTOLIC BLOOD PRESSURE: 79 MMHG | TEMPERATURE: 98.1 F | BODY MASS INDEX: 34.15 KG/M2

## 2022-04-25 DIAGNOSIS — M79.605 LEFT LEG PAIN: ICD-10-CM

## 2022-04-25 DIAGNOSIS — N30.00 ACUTE CYSTITIS WITHOUT HEMATURIA: ICD-10-CM
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
## 2022-04-25 LAB
A/G RATIO: 1.6 (ref 1.1–2.2)
ALBUMIN SERPL-MCNC: 4.5 G/DL (ref 3.4–5)
ALP BLD-CCNC: 67 U/L (ref 40–129)
ALT SERPL-CCNC: 21 U/L (ref 10–40)
ANION GAP SERPL CALCULATED.3IONS-SCNC: 9 MMOL/L (ref 3–16)
AST SERPL-CCNC: 15 U/L (ref 15–37)
BACTERIA: ABNORMAL /HPF
BASOPHILS ABSOLUTE: 0 K/UL (ref 0–0.2)
BASOPHILS RELATIVE PERCENT: 0.5 %
BILIRUB SERPL-MCNC: <0.2 MG/DL (ref 0–1)
BILIRUBIN URINE: NEGATIVE
BLOOD, URINE: NEGATIVE
BUN BLDV-MCNC: 16 MG/DL (ref 7–20)
CALCIUM SERPL-MCNC: 10.2 MG/DL (ref 8.3–10.6)
CHLORIDE BLD-SCNC: 98 MMOL/L (ref 99–110)
CLARITY: CLEAR
CO2: 29 MMOL/L (ref 21–32)
COLOR: YELLOW
CREAT SERPL-MCNC: 0.7 MG/DL (ref 0.6–1.1)
EOSINOPHILS ABSOLUTE: 0.2 K/UL (ref 0–0.6)
EOSINOPHILS RELATIVE PERCENT: 1.8 %
EPITHELIAL CELLS, UA: ABNORMAL /HPF (ref 0–5)
GFR AFRICAN AMERICAN: >60
GFR NON-AFRICAN AMERICAN: >60
GLUCOSE BLD-MCNC: 140 MG/DL (ref 70–99)
GLUCOSE URINE: NEGATIVE MG/DL
HCG(URINE) PREGNANCY TEST: NEGATIVE
HCT VFR BLD CALC: 35.4 % (ref 36–48)
HEMOGLOBIN: 11.6 G/DL (ref 12–16)
KETONES, URINE: NEGATIVE MG/DL
LEUKOCYTE ESTERASE, URINE: ABNORMAL
LYMPHOCYTES ABSOLUTE: 2.1 K/UL (ref 1–5.1)
LYMPHOCYTES RELATIVE PERCENT: 23.1 %
MCH RBC QN AUTO: 27.4 PG (ref 26–34)
MCHC RBC AUTO-ENTMCNC: 32.8 G/DL (ref 31–36)
MCV RBC AUTO: 83.5 FL (ref 80–100)
MICROSCOPIC EXAMINATION: YES
MONOCYTES ABSOLUTE: 0.5 K/UL (ref 0–1.3)
MONOCYTES RELATIVE PERCENT: 5.8 %
NEUTROPHILS ABSOLUTE: 6.4 K/UL (ref 1.7–7.7)
NEUTROPHILS RELATIVE PERCENT: 68.8 %
NITRITE, URINE: NEGATIVE
PDW BLD-RTO: 14.2 % (ref 12.4–15.4)
PH UA: 7 (ref 5–8)
PLATELET # BLD: 311 K/UL (ref 135–450)
PMV BLD AUTO: 7.5 FL (ref 5–10.5)
POTASSIUM REFLEX MAGNESIUM: 4.6 MMOL/L (ref 3.5–5.1)
PROTEIN UA: NEGATIVE MG/DL
RBC # BLD: 4.24 M/UL (ref 4–5.2)
RBC UA: ABNORMAL /HPF (ref 0–4)
SODIUM BLD-SCNC: 136 MMOL/L (ref 136–145)
SPECIFIC GRAVITY UA: 1.01 (ref 1–1.03)
TOTAL PROTEIN: 7.3 G/DL (ref 6.4–8.2)
URINE REFLEX TO CULTURE: ABNORMAL
URINE TYPE: ABNORMAL
UROBILINOGEN, URINE: 0.2 E.U./DL
WBC # BLD: 9.3 K/UL (ref 4–11)
WBC UA: ABNORMAL /HPF (ref 0–5)

## 2022-04-25 PROCEDURE — 85025 COMPLETE CBC W/AUTO DIFF WBC: CPT

## 2022-04-25 PROCEDURE — 6370000000 HC RX 637 (ALT 250 FOR IP): Performed by: EMERGENCY MEDICINE

## 2022-04-25 PROCEDURE — 73502 X-RAY EXAM HIP UNI 2-3 VIEWS: CPT

## 2022-04-25 PROCEDURE — 93005 ELECTROCARDIOGRAM TRACING: CPT | Performed by: EMERGENCY MEDICINE

## 2022-04-25 PROCEDURE — 84703 CHORIONIC GONADOTROPIN ASSAY: CPT

## 2022-04-25 PROCEDURE — 99285 EMERGENCY DEPT VISIT HI MDM: CPT

## 2022-04-25 PROCEDURE — 80053 COMPREHEN METABOLIC PANEL: CPT

## 2022-04-25 PROCEDURE — 73562 X-RAY EXAM OF KNEE 3: CPT

## 2022-04-25 PROCEDURE — 81001 URINALYSIS AUTO W/SCOPE: CPT

## 2022-04-25 RX ORDER — IBUPROFEN 600 MG/1
600 TABLET ORAL ONCE
Status: COMPLETED | OUTPATIENT
Start: 2022-04-25 | End: 2022-04-25

## 2022-04-25 RX ORDER — CEPHALEXIN 500 MG/1
500 CAPSULE ORAL 2 TIMES DAILY
Qty: 14 CAPSULE | Refills: 0 | Status: SHIPPED | OUTPATIENT
Start: 2022-04-25 | End: 2022-05-02

## 2022-04-25 RX ADMIN — IBUPROFEN 600 MG: 600 TABLET ORAL at 21:56

## 2022-04-25 ASSESSMENT — PAIN SCALES - GENERAL: PAINLEVEL_OUTOF10: 7

## 2022-04-25 ASSESSMENT — PAIN DESCRIPTION - DESCRIPTORS: DESCRIPTORS: ACHING

## 2022-04-25 ASSESSMENT — PAIN DESCRIPTION - ORIENTATION: ORIENTATION: LEFT

## 2022-04-25 ASSESSMENT — PAIN - FUNCTIONAL ASSESSMENT: PAIN_FUNCTIONAL_ASSESSMENT: 0-10

## 2022-04-25 NOTE — ED PROVIDER NOTES
[facility] Emergency Department      CHIEF COMPLAINT  Fall (hx of mary; syncopal episode; unsure of head injury; denies LOC) and Leg Pain      HISTORY OF PRESENT ILLNESS  Newton Mei is a 33 y.o. female with a reported history of [place] syndrome, seizures, autism and POTS presents after she states that she became lightheaded and fell. She does not think she completely lost consciousness. She fell onto her left side and is having pain in her left hip and left knee. She does not think she hit her head. No loss of consciousness. She is not on any anticoagulants. She is able to ambulate just has pain in her hip and knee on the left. She denies any chest pain or shortness of breath. No neck pain. .   No other complaints, modifying factors or associated symptoms. I have reviewed the following from the nursing documentation.     Past Medical History:   Diagnosis Date    ADHD (attention deficit hyperactivity disorder)     Anxiety     Asthma     Autism     Chronic back pain     Depression     Johnie-Danlos syndrome     Seizures (HCC)      Past Surgical History:   Procedure Laterality Date    ANKLE FRACTURE SURGERY Right     CHOLECYSTECTOMY      EYE SURGERY      WRIST GANGLION EXCISION       Family History   Problem Relation Age of Onset    Diabetes Father     High Blood Pressure Father     Heart Disease Father     High Blood Pressure Paternal Grandmother     Diabetes Paternal Grandmother     Stroke Paternal Grandmother     Depression Paternal Grandmother     Dementia Paternal Grandmother     Heart Disease Paternal Grandmother      Social History     Socioeconomic History    Marital status:      Spouse name: Not on file    Number of children: Not on file    Years of education: Not on file    Highest education level: Not on file   Occupational History    Not on file   Tobacco Use    Smoking status: Never Smoker    Smokeless tobacco: Never Used   Vaping Use  Vaping Use: Every day    Substances: Nicotine, Flavoring, D-8    Substance and Sexual Activity    Alcohol use: No    Drug use: No    Sexual activity: Yes     Partners: Male   Other Topics Concern    Not on file   Social History Narrative    Not on file     Social Determinants of Health     Financial Resource Strain:     Difficulty of Paying Living Expenses: Not on file   Food Insecurity:     Worried About Running Out of Food in the Last Year: Not on file    Joaquín of Food in the Last Year: Not on file   Transportation Needs:     Lack of Transportation (Medical): Not on file    Lack of Transportation (Non-Medical): Not on file   Physical Activity:     Days of Exercise per Week: Not on file    Minutes of Exercise per Session: Not on file   Stress:     Feeling of Stress : Not on file   Social Connections:     Frequency of Communication with Friends and Family: Not on file    Frequency of Social Gatherings with Friends and Family: Not on file    Attends Denominational Services: Not on file    Active Member of [address] [place] [address] [employer] or Organizations: Not on file    Attends Club or Organization Meetings: Not on file    Marital Status: Not on file   Intimate Partner Violence:     Fear of Current or Ex-Partner: Not on file    Emotionally Abused: Not on file    Physically Abused: Not on file    Sexually Abused: Not on file   Housing Stability:     Unable to Pay for Housing in the Last Year: Not on file    Number of [place] in the Last Year: Not on file    Unstable Housing in the Last Year: Not on file     No current facility-administered medications for this encounter.      Current Outpatient Medications   Medication Sig Dispense Refill    cephALEXin (KEFLEX) 500 MG capsule Take 1 capsule by mouth 2 times daily for 7 days 14 capsule 0    ibuprofen (ADVIL;MOTRIN) 800 MG tablet TAKE ONE TABLET BY MOUTH TWICE A DAY AS NEEDED FOR PAIN 60 tablet 0    budesonide-formoterol (SYMBICORT) 80-4.5 MCG/ACT AERO Inhale 2 puffs into the lungs 2 times daily 10.2 g 3    citalopram (CELEXA) 10 MG tablet Take 10 mg by mouth daily      escitalopram (LEXAPRO) 10 MG tablet Take 10 mg by mouth daily      amphetamine-dextroamphetamine (ADDERALL XR) 30 MG extended release capsule Take 30 mg by mouth every morning.  amphetamine-dextroamphetamine (ADDERALL, 15MG,) 15 MG tablet Take 15 mg by mouth 2 times daily.  tiZANidine (ZANAFLEX) 4 MG tablet Take 1 tablet by mouth daily as needed (muscle spasms) 30 tablet 0    famotidine (PEPCID) 20 MG tablet Take 1 tablet by mouth 2 times daily 60 tablet 3    amphetamine-dextroamphetamine (ADDERALL, 15MG,) 15 MG tablet Take 1 tablet by mouth daily for 14 days. afternoon 14 tablet 0    hydrOXYzine (VISTARIL) 25 MG capsule Take 25 mg by mouth 3 times daily as needed for Itching       amphetamine-dextroamphetamine (ADDERALL XR) 30 MG extended release capsule Take 1 capsule by mouth every morning for 30 days. 30 capsule 0    gabapentin (NEURONTIN) 800 MG tablet Take 1 tablet by mouth 3 times daily for 30 days.  90 tablet 2    PRAZOSIN HCL PO Take by mouth      OXcarbazepine (TRILEPTAL) 600 MG tablet Take 1 tablet by mouth 2 times daily 60 tablet 2    albuterol sulfate HFA (VENTOLIN HFA) 108 (90 Base) MCG/ACT inhaler Inhale 2 puffs into the lungs 4 times daily as needed for Wheezing 18 g 5    EPINEPHrine (EPIPEN 2-KOREY) 0.3 MG/0.3ML SOAJ injection Inject 0.3 mLs into the skin once for 1 dose Use as directed for allergic reaction 0.3 mL 0    Cholecalciferol (VITAMIN D3) 95115 UNITS CAPS Take by mouth        Allergies   Allergen Reactions    Tree Nut [Macadamia Nut Oil] Hives and Itching    Azithromycin     Keppra [Levetiracetam]      Extreme rage    Levaquin [Levofloxacin]      Nerve and muscle issues    Wellbutrin [Bupropion] Other (See Comments)     Suicidal ideation    Pcn [Penicillins] Rash       REVIEW OF SYSTEMS      General:  No fevers  Eyes:  No recent vison changes  ENT:  No sore throat, no nasal congestion  Cardiovascular:  no palpitations  Respiratory:   no cough, no wheezing  Gastrointestinal:  No abdominal pain, no vomiting, no diarrhea  Musculoskeletal: Left hip and knee pain. Skin:  No rash   Neurologic:  No speech problems, no headache, no extremity numbness, no extremity weakness  Genitourinary:  No dysuria  Extremities:  no edema, no pain      Unless otherwise stated in this report, this patient's positive and negative responses for review of systems (constitutional, eyes, ENT, cardiovascular, respiratory, gastrointestinal, neurological, genitourinary, musculoskeletal, integument systems and systems related to the presenting problem) are either stated in the preceding paragraph, were not pertinent or were negative for the symptoms and/or complaints related to the medical problem. PHYSICAL EXAM  /79   Pulse 100   Temp 98.1 °F (36.7 °C) (Oral)   Resp 21   Ht 5' 4\" (1.626 m)   Wt 200 lb (90.7 kg)   LMP 04/15/2022   SpO2 100%   BMI 34.33 kg/m²   GENERAL APPEARANCE: Awake and alert. Cooperative. No acute distress. HEAD: Normocephalic. Atraumatic. EYES: PERRL. EOM's grossly intact. ENT: Mucous membranes are moist.   NECK: Supple, trachea midline. No C-spine tenderness. HEART: RRR. LUNGS: Respirations unlabored. CTAB. Good air exchange. No wheezes, rales, or rhonchi. Speaking comfortably in full sentences. ABDOMEN: Soft. Non-distended. Non-tender. No guarding or rebound. EXTREMITIES: No peripheral edema. MAEE. No acute deformities. Diffusely tender left knee. No joint effusion noted. Painful range of motion of the left hip. Left leg is neurovascularly intact. SKIN: Warm, dry and intact. No acute rashes. NEUROLOGICAL: Alert and oriented X 3. CN II-XII grossly intact. Strength 5/5, sensation intact. PSYCHIATRIC: Normal mood and affect. LABS  I have reviewed all labs for this visit.    Results for orders placed or performed during the hospital encounter of 04/25/22   CBC with Auto Differential   Result Value Ref Range    WBC 9.3 4.0 - 11.0 K/uL    RBC 4.24 4.00 - 5.20 M/uL    Hemoglobin 11.6 (L) 12.0 - 16.0 g/dL    Hematocrit 35.4 (L) 36.0 - 48.0 %    MCV 83.5 80.0 - 100.0 fL    MCH 27.4 26.0 - 34.0 pg    MCHC 32.8 31.0 - 36.0 g/dL    RDW 14.2 12.4 - 15.4 %    Platelets 308 700 - 629 K/uL    MPV 7.5 5.0 - 10.5 fL    Neutrophils % 68.8 %    Lymphocytes % 23.1 %    Monocytes % 5.8 %    Eosinophils % 1.8 %    Basophils % 0.5 %    Neutrophils Absolute 6.4 1.7 - 7.7 K/uL    Lymphocytes Absolute 2.1 1.0 - 5.1 K/uL    Monocytes Absolute 0.5 0.0 - 1.3 K/uL    Eosinophils Absolute 0.2 0.0 - 0.6 K/uL    Basophils Absolute 0.0 0.0 - 0.2 K/uL   Comprehensive Metabolic Panel w/ Reflex to MG   Result Value Ref Range    Sodium 136 136 - 145 mmol/L    Potassium reflex Magnesium 4.6 3.5 - 5.1 mmol/L    Chloride 98 (L) 99 - 110 mmol/L    CO2 29 21 - 32 mmol/L    Anion Gap 9 3 - 16    Glucose 140 (H) 70 - 99 mg/dL    BUN 16 7 - 20 mg/dL    CREATININE 0.7 0.6 - 1.1 mg/dL    GFR Non-African American >60 >60    GFR African American >60 >60    Calcium 10.2 8.3 - 10.6 mg/dL    Total Protein 7.3 6.4 - 8.2 g/dL    Albumin 4.5 3.4 - 5.0 g/dL    Albumin/Globulin Ratio 1.6 1.1 - 2.2    Total Bilirubin <0.2 0.0 - 1.0 mg/dL    Alkaline Phosphatase 67 40 - 129 U/L    ALT 21 10 - 40 U/L    AST 15 15 - 37 U/L   Urinalysis with Reflex to Culture    Specimen: Urine, clean catch   Result Value Ref Range    Color, UA Yellow Straw/Yellow    Clarity, UA Clear Clear    Glucose, Ur Negative Negative mg/dL    Bilirubin Urine Negative Negative    Ketones, Urine Negative Negative mg/dL    Specific Gravity, UA 1.015 1.005 - 1.030    Blood, Urine Negative Negative    pH, UA 7.0 5.0 - 8.0    Protein, UA Negative Negative mg/dL    Urobilinogen, Urine 0.2 <2.0 E.U./dL    Nitrite, Urine Negative Negative    Leukocyte Esterase, Urine TRACE (A) Negative    Microscopic Examination YES     Urine Type NotGiven Urine Reflex to Culture Not Indicated    Pregnancy, Urine   Result Value Ref Range    HCG(Urine) Pregnancy Test Negative Detects HCG level >20 MIU/mL   Microscopic Urinalysis   Result Value Ref Range    WBC, UA 6-9 (A) 0 - 5 /HPF    RBC, UA 0-2 0 - 4 /HPF    Epithelial Cells, UA 0-1 0 - 5 /HPF    Bacteria, UA Rare (A) None Seen /HPF   EKG 12 Lead   Result Value Ref Range    Ventricular Rate 102 BPM    Atrial Rate 102 BPM    P-R Interval 196 ms    QRS Duration 84 ms    Q-T Interval 344 ms    QTc Calculation (Bazett) 448 ms    P Axis 65 degrees    R Axis 74 degrees    T Axis 56 degrees    Diagnosis       Sinus tachycardiaConfirmed by MEHUL KHANNA MD ([id number]) on 4/26/2022 11:43:08 AM       EKG  The Ekg interpreted by myself  sinus tachycardia, aept=345 with a rate of 102  Axis is   Normal  QTc is  normal  Intervals and Durations are unremarkable. No specific ST-T wave changes appreciated. No evidence of acute ischemia. Sinus tachycardia has replaced normal sinus rhythm when compared to the EKG from November 5, 2013. RADIOLOGY  X-RAYS: ALL IMAGES INCLUDING PLAIN FILMS, CT, ULTRASOUND AND MRI HAVE BEEN READ BY THE RADIOLOGIST. I have personally reviewed plain film images and have reviewed the radiology reports. XR HIP 2-3 VW W PELVIS LEFT   Final Result   1. No acute fracture or malalignment. XR KNEE LEFT (3 VIEWS)   Final Result   1. No acute fracture or malalignment. Rechecks: Physical assessment performed. The patient was updated on her lab work and x-ray findings. Supportive care recommended at home. ED COURSE/MDM  Patient seen and evaluated. Here the patient is afebrile with normal vitals signs. Old records reviewed. here the patient is extremely well-appearing. No external signs of trauma. She reports a history of pots and becoming lightheaded is not a new issue for this patient.   In light of this however I did get an EKG and blood work was all appear to be baseline. She does have a mild UTI. X-ray of the left hip and knee for fracture. Supportive care commended at home. I will start her on antibiotics for UTI. She is tolerating p.o. and looks extremely well. Labs and imaging reviewed and results discussed with patient. Patient was reassessed as noted above . Plan of care discussed with patient. Patient in agreement with plan. Strict return precautions have been given. Patient was given scripts for the following medications. I counseled patient how to take these medications. Discharge Medication List as of 4/25/2022  9:59 PM      START taking these medications    Details   cephALEXin (KEFLEX) 500 MG capsule Take 1 capsule by mouth 2 times daily for 7 days, Disp-14 capsule, R-0Normal             CLINICAL IMPRESSION  1. Fall, initial encounter    2. Left leg pain    3. Acute cystitis without hematuria        Blood pressure 114/79, pulse 100, temperature 98.1 °F (36.7 °C), temperature source Oral, resp. rate 21, height 5' 4\" (1.626 m), weight 200 lb (90.7 kg), last menstrual period 04/15/2022, SpO2 100 %, not currently breastfeeding. Amalia Shah was discharged to home in stable condition.     (Please note this note was completed with a voice recognition program.  Efforts were made to edit the dictations but occasionally words are mis-transcribed.)        Ayala Garcia MD  04/27/22 [id number]

## 2022-04-26 LAB
EKG ATRIAL RATE: 102 BPM
EKG DIAGNOSIS: NORMAL
EKG P AXIS: 65 DEGREES
EKG P-R INTERVAL: 196 MS
EKG Q-T INTERVAL: 344 MS
EKG QRS DURATION: 84 MS
EKG QTC CALCULATION (BAZETT): 448 MS
EKG R AXIS: 74 DEGREES
EKG T AXIS: 56 DEGREES
EKG VENTRICULAR RATE: 102 BPM

## 2022-04-26 PROCEDURE — 93010 ELECTROCARDIOGRAM REPORT: CPT | Performed by: INTERNAL MEDICINE

## 2022-04-27 ENCOUNTER — HOSPITAL ENCOUNTER (OUTPATIENT)
Dept: PHYSICAL THERAPY | Age: 32
Setting detail: THERAPIES SERIES
Discharge: HOME | End: 2022-04-27
Payer: MEDICAID

## 2022-04-27 PROCEDURE — 97162 PT EVAL MOD COMPLEX 30 MIN: CPT

## 2022-04-27 PROCEDURE — 97110 THERAPEUTIC EXERCISES: CPT

## 2022-04-27 NOTE — PROGRESS NOTES
[facility], [address], [address]   Phone: [phone]  Fax [phone]     Physical Therapy: Initial Evaluation    Patient: Herlinda Rose (68 y.o. female)   Examination Date:   Plan of Care Certification Period: 2022 to        :  1990 ;    Confirmed: Yes MRN: [id number]  CSN: [id number]   Insurance: Payor: MEDICAID [place] / Plan: Sarah Kaufman DEPT OF JOB / Product Type: *No Product type* /   Insurance ID: [id number] - (Medicaid) Secondary Insurance (if applicable):    Referring Physician: Quincy Cid MD     PCP: Shakir Vasquez DO Visits to Date/Visits Approved:   /      No Show/Cancelled Appts:   /       Medical Diagnosis: Johnie-Danlos syndrome, unspecified [Q79.60]  Other specified postprocedural states [Z98.890] Q79.60 Johnie-Danlos Syndrome, Z372.549 s/p R ankle ligament repair  Treatment Diagnosis:       PERTINENT MEDICAL HISTORY           Medical History: Chart Reviewed: Yes   Past Medical History:   Diagnosis Date    ADHD (attention deficit hyperactivity disorder)     Anxiety     Asthma     Autism     Chronic back pain     Depression     Johnie-Danlos syndrome     Seizures ([facility] [address]      Surgical History:   Past Surgical History:   Procedure Laterality Date    ANKLE FRACTURE SURGERY Right     CHOLECYSTECTOMY      EYE SURGERY      WRIST GANGLION EXCISION         Medications:   Current Outpatient Medications:     cephALEXin (KEFLEX) 500 MG capsule, Take 1 capsule by mouth 2 times daily for 7 days, Disp: 14 capsule, Rfl: 0    ibuprofen (ADVIL;MOTRIN) 800 MG tablet, TAKE ONE TABLET BY MOUTH TWICE A DAY AS NEEDED FOR PAIN, Disp: 60 tablet, Rfl: 0    budesonide-formoterol (SYMBICORT) 80-4.5 MCG/ACT AERO, Inhale 2 puffs into the lungs 2 times daily, Disp: 10.2 g, Rfl: 3    citalopram (CELEXA) 10 MG tablet, Take 10 mg by mouth daily, Disp: , Rfl:     escitalopram (LEXAPRO) 10 MG tablet, Take 10 mg by mouth daily, Disp: , Rfl:     amphetamine-dextroamphetamine (ADDERALL XR) 30 MG extended release capsule, Take 30 mg by mouth every morning., Disp: , Rfl:     amphetamine-dextroamphetamine (ADDERALL, 15MG,) 15 MG tablet, Take 15 mg by mouth 2 times daily. , Disp: , Rfl:     tiZANidine (ZANAFLEX) 4 MG tablet, Take 1 tablet by mouth daily as needed (muscle spasms), Disp: 30 tablet, Rfl: 0    famotidine (PEPCID) 20 MG tablet, Take 1 tablet by mouth 2 times daily, Disp: 60 tablet, Rfl: 3    amphetamine-dextroamphetamine (ADDERALL, 15MG,) 15 MG tablet, Take 1 tablet by mouth daily for 14 days. afternoon, Disp: 14 tablet, Rfl: 0    hydrOXYzine (VISTARIL) 25 MG capsule, Take 25 mg by mouth 3 times daily as needed for Itching , Disp: , Rfl:     amphetamine-dextroamphetamine (ADDERALL XR) 30 MG extended release capsule, Take 1 capsule by mouth every morning for 30 days. , Disp: 30 capsule, Rfl: 0    gabapentin (NEURONTIN) 800 MG tablet, Take 1 tablet by mouth 3 times daily for 30 days. , Disp: 90 tablet, Rfl: 2    PRAZOSIN HCL PO, Take by mouth, Disp: , Rfl:     OXcarbazepine (TRILEPTAL) 600 MG tablet, Take 1 tablet by mouth 2 times daily, Disp: 60 tablet, Rfl: 2    albuterol sulfate HFA (VENTOLIN HFA) 108 (90 Base) MCG/ACT inhaler, Inhale 2 puffs into the lungs 4 times daily as needed for Wheezing, Disp: 18 g, Rfl: 5    EPINEPHrine (EPIPEN 2-KOREY) 0.3 MG/0.3ML SOAJ injection, Inject 0.3 mLs into the skin once for 1 dose Use as directed for allergic reaction, Disp: 0.3 mL, Rfl: 0    Cholecalciferol (VITAMIN D3) 60461 UNITS CAPS, Take by mouth , Disp: , Rfl:   Allergies: Tree nut [macadamia nut oil], Azithromycin, Keppra [levetiracetam], Levaquin [levofloxacin], Wellbutrin [bupropion], and Pcn [penicillins]      SUBJECTIVE EXAMINATION      ,      Family/Caregiver Present: No    Subjective History:  Onset Date: 05/01/21  Subjective: Pt fell in May of 2021, fracturing her ankle and 3 bones in her foot. Thought it was a sprain so she didn't get it examined immediately. Had surgery in October of 2021 for stabilzation. Wore a walking boot afterward but did not have PT. Took herself out of the boot in February of 2022. Will cont to wear it occasionally, such as when she goes to the zoo. She currently c.o apin from her lateral ankle to her knee, which she rates as 4-7/10. Also fell on 4/25 and rolled her ankle. Had x-ray of hip which was WNL. States her standing tolerance is an hour. Driving tolerance is an hour as well. Rates moderate to quite a bit of difficulty with several ADLs. Additional Pertinent Hx (if applicable):     Comments: PLOF:  no functional limitations. Has 4 special needs children. Unemployed. OBJECTIVE EXAMINATION         Observations:  Good scar mobility       Palpation:  TTP along lateral lower RLE and posterior to lateral malleolus       Ambulation/Gait (if applicable):   Ambulation  Quality of Gait: WNL. Able to heel walk and toe walk as well. Left AROM  Right AROM       ankle PF 58, DF 14, inversion 60, eversion 6   Hypermobile in hip and knee  ankle PF 60, DF 10, inversion 50, eversion 10   Hypermobile in hip and knee     Left PROM  Right PROM                    Left Strength  Right Strength       PGM 4-/5, otherwise 5/5 grossly in hip, glutes, knee, ankle, toes     5/5 grossly          Joint Mobility (if applicable):    gross hypermobility due to EDS         ASSESSMENT     Impression: Assessment: Pt presents with R ankle pain s/p surgery 11 mos ago. Hypermobile due to EDS. Decreased PGM strength contributing to increased demand on R ankle stability. High pain ratings despite normal gt and good ankle strength. Anticipate return to PLOF.     Body Structures, Functions, Activity Limitations Requiring Skilled Therapeutic Intervention: Decreased ADL status,Decreased strength,Decreased endurance,Increased pain    Statement of Medical Necessity: Physical Therapy is both indicated and medically necessary as outlined in the POC to increase the likelihood of meeting the functionally related goals stated below. Patient's Activity Tolerance: Patient tolerated evaluation without incident      Patient's rehabilitation potential/prognosis is considered to be:  good    Factors which may impact rehabilitation potential include:  EDS        GOALS   Patient Goal(s): walk with reduced pain, reduce swelling  Short Term Goals Completed by 3 wks Goal Status   Pt will decrease pain by 50% in frequency or intensity     Pt will be ind and compliant with HEP     Pt will increase R PGM strength to 5/5                     Long Term Goals Completed by   Goal Status   Pt will decrease pain by % in frequency or intensity     Pt will deny a functional limitation with standing/ walking due to pain     Pt will deny a functional limitation with driving due to pain                      TREATMENT PLAN            Pt. actively involved in establishing Plan of Care and Goals: Yes  Patient/ Caregiver education and instruction:               Treatment may include any combination of the following: Strengthening,Balance training,Stair training,Neuromuscular re-education,Manual Therapy - Soft Tissue Mobilization,Home exercise program     Frequency / Duration:  Patient to be seen 2x/wk x6 wks for   weeks      Eval Complexity: Overall Evaluation : Medium  Decision Making: Medium Complexity  History: Personal Factors and/or Comorbidities Impacting POC: Medium  Examination of body system(s) including body structures and functions, activity limitations, and/or participation restrictions: Low  Clinical Presentation: Medium    PT Treatment Completed:  N/A - Evaluation Only  I      Therapist Signature: Janet Liu PT    Date: 6/49/9154     I certify that the above Therapy Services are being furnished while the patient is under my care.  I agree with the treatment plan and certify that this therapy is necessary. Physician's Signature:  ___________________________   Date:_______                                                                   Kashif Frias MD        Physician Comments: _______________________________________________    Please sign and return to "[employer]. Please fax to the location listed below.  Keshia Goldman for this referral!    [address] [address]  Dept: [phone]  Loc: [phone]       POC NOTE

## 2022-04-27 NOTE — FLOWSHEET NOTE
[facility], [facility], [facility]  Phone: [phone]  Fax [phone]    Physical Therapy Daily Treatment Note    Date:  2022    Patient Name:  Pollo Roberts    :  1990  MRN: [id number]  Restrictions/Precautions:    Medical/Treatment Diagnosis Information:  · Diagnosis: Q79.60 Johnie-Danlos Syndrome, D787.196 s/p R ankle ligament repair  Insurance/Certification information:  PT Insurance Information: Medicaid  Physician Information:   Stefanie Gan MD  Plan of care signed (Y/N):  sent  Visit# / total visits:      G-Code (if applicable):           FOTO: 35      Time in:   10:10      Timed Treatment: 10 Total Treatment Time:  40  Time out: 10:50  ________________________________________________________________________________________    Pain Level:    4-10/10  SUBJECTIVE:  See eval    OBJECTIVE: see eval    Exercise/Equipment Resistance/Repetitions Other comments     Ankle PNF D1& D2 vs man resistance nv      TG nv             HR       Standing balance White 1/2 roll nv      Tandem stance nv      Lat amb with TB around thighs nv      BAPS board nv                                                                                      Other Therapeutic Activities:  eval completed, HEP issued and practiced    HEP:  4-way ankle with TB, towel scrunches, arch raises, ankle alphabet, clamshells   [id number]    Manual Treatments:       STM to lateral lower RLE and ankle  Assess fibular head nv  Desensitization to lateral ankle if indicated nv    Modalities:      Test/Measurements:         ASSESSMENT:     See eval    Treatment/Activity Tolerance:   [x]Patient tolerated treatment well [] Patient limited by fatique  []Patient limited by pain [] Patient limited by other medical complications  [] Other:     Goals:    Short Term Goals  Time Frame for Short term goals: 3 wks  Short term goal 1: Pt will decrease pain by 50% in frequency or intensity  Short term goal 2: Pt will be ind and compliant with HEP  Short term goal 3: Pt will increase R PGM strength to 5/5     Long Term Goals  Long term goal 1: Pt will decrease pain by % in frequency or intensity  Long term goal 2: Pt will deny a functional limitation with standing/ walking due to pain  Long term goal 3: Pt will deny a functional limitation with driving due to pain     Plan: [] Continue per plan of care [] Alter current plan (see comments)   [x] Plan of care initiated [] Hold pending MD visit [] Discharge      Plan for Next Session:      Re-Certification Due Date:         Signature:  Jose Mcclain PT

## 2022-05-05 ENCOUNTER — OFFICE VISIT (OUTPATIENT)
Dept: FAMILY MEDICINE CLINIC | Age: 32
End: 2022-05-05
Payer: MEDICAID

## 2022-05-05 VITALS
HEART RATE: 104 BPM | RESPIRATION RATE: 18 BRPM | BODY MASS INDEX: 39.57 KG/M2 | HEIGHT: 64 IN | SYSTOLIC BLOOD PRESSURE: 116 MMHG | TEMPERATURE: 97.7 F | OXYGEN SATURATION: 99 % | DIASTOLIC BLOOD PRESSURE: 76 MMHG | WEIGHT: 231.8 LBS

## 2022-05-05 DIAGNOSIS — Z76.89 ENCOUNTER TO ESTABLISH CARE: Primary | ICD-10-CM

## 2022-05-05 DIAGNOSIS — G89.4 CHRONIC PAIN DISORDER: ICD-10-CM

## 2022-05-05 DIAGNOSIS — Z79.899 POLYPHARMACY: ICD-10-CM

## 2022-05-05 DIAGNOSIS — F90.9 ATTENTION DEFICIT HYPERACTIVITY DISORDER (ADHD), UNSPECIFIED ADHD TYPE: ICD-10-CM

## 2022-05-05 DIAGNOSIS — F68.10 FACTITIOUS DISORDER: ICD-10-CM

## 2022-05-05 DIAGNOSIS — Q79.60 EHLERS-DANLOS SYNDROME: ICD-10-CM

## 2022-05-05 PROBLEM — E66.01 CLASS 2 SEVERE OBESITY DUE TO EXCESS CALORIES WITH SERIOUS COMORBIDITY AND BODY MASS INDEX (BMI) OF 39.0 TO 39.9 IN ADULT (HCC): Status: ACTIVE | Noted: 2022-05-05

## 2022-05-05 PROBLEM — E66.812 CLASS 2 SEVERE OBESITY DUE TO EXCESS CALORIES WITH SERIOUS COMORBIDITY AND BODY MASS INDEX (BMI) OF 39.0 TO 39.9 IN ADULT: Status: ACTIVE | Noted: 2022-05-05

## 2022-05-05 PROCEDURE — 99204 OFFICE O/P NEW MOD 45 MIN: CPT | Performed by: PHYSICIAN ASSISTANT

## 2022-05-05 RX ORDER — RISPERIDONE 1 MG/1
1 TABLET, FILM COATED ORAL 2 TIMES DAILY
COMMUNITY

## 2022-05-05 RX ORDER — MIRTAZAPINE 30 MG/1
30 TABLET, FILM COATED ORAL NIGHTLY
COMMUNITY
End: 2022-06-30

## 2022-05-05 ASSESSMENT — PATIENT HEALTH QUESTIONNAIRE - PHQ9
SUM OF ALL RESPONSES TO PHQ9 QUESTIONS 1 & 2: 2
1. LITTLE INTEREST OR PLEASURE IN DOING THINGS: 1
2. FEELING DOWN, DEPRESSED OR HOPELESS: 1
6. FEELING BAD ABOUT YOURSELF - OR THAT YOU ARE A FAILURE OR HAVE LET YOURSELF OR YOUR FAMILY DOWN: 1
9. THOUGHTS THAT YOU WOULD BE BETTER OFF DEAD, OR OF HURTING YOURSELF: 0
SUM OF ALL RESPONSES TO PHQ QUESTIONS 1-9: 18
4. FEELING TIRED OR HAVING LITTLE ENERGY: 3
10. IF YOU CHECKED OFF ANY PROBLEMS, HOW DIFFICULT HAVE THESE PROBLEMS MADE IT FOR YOU TO DO YOUR WORK, TAKE CARE OF THINGS AT HOME, OR GET ALONG WITH OTHER PEOPLE: 1
SUM OF ALL RESPONSES TO PHQ QUESTIONS 1-9: 18
SUM OF ALL RESPONSES TO PHQ QUESTIONS 1-9: 18
8. MOVING OR SPEAKING SO SLOWLY THAT OTHER PEOPLE COULD HAVE NOTICED. OR THE OPPOSITE, BEING SO FIGETY OR RESTLESS THAT YOU HAVE BEEN MOVING AROUND A LOT MORE THAN USUAL: 3
5. POOR APPETITE OR OVEREATING: 3
3. TROUBLE FALLING OR STAYING ASLEEP: 3
SUM OF ALL RESPONSES TO PHQ QUESTIONS 1-9: 18
7. TROUBLE CONCENTRATING ON THINGS, SUCH AS READING THE NEWSPAPER OR WATCHING TELEVISION: 3
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
## 2022-05-18 ENCOUNTER — HOSPITAL ENCOUNTER (OUTPATIENT)
Dept: PHYSICAL THERAPY | Age: 32
Setting detail: THERAPIES SERIES
Discharge: HOME | End: 2022-05-18

## 2022-05-18 NOTE — PROGRESS NOTES
Physical Therapy  Cancellation/No-show Note  Patient Name:  Harman Cosme  :  1990   Date:  2022  Cancels to date: 1  No-shows to date: 0    For today's appointment patient:  [x] Cancelled  [] Rescheduled appointment  [] No-show     Reason given by patient:  [] Patient ill  [] Conflicting appointment  [] No transportation    [] Conflict with work  [] No reason given  [x] Other:  Sick child    Comments:      Electronically signed by:  Simone Vences PT

## 2022-05-20 ENCOUNTER — HOSPITAL ENCOUNTER (OUTPATIENT)
Dept: PHYSICAL THERAPY | Age: 32
Setting detail: THERAPIES SERIES
Discharge: HOME | End: 2022-05-20

## 2022-05-20 NOTE — PROGRESS NOTES
Physical Therapy  Cancellation/No-show Note  Patient Name:  Gwyn Rosenberg  :  1990   Date:  2022  Cancels to date: 1  No-shows to date: 1    For today's appointment patient:  [] Cancelled  [] Rescheduled appointment  [x] No-show     Reason given by patient:  [] Patient ill  [] Conflicting appointment  [] No transportation    [] Conflict with work  [] No reason given  [] Other:     Comments:      Electronically signed by:  Chloe Jay PT

## 2022-05-22 ENCOUNTER — HOSPITAL ENCOUNTER (EMERGENCY)
Age: 32
Discharge: HOME | End: 2022-05-22
Payer: MEDICAID

## 2022-05-22 VITALS
HEIGHT: 64 IN | WEIGHT: 230 LBS | TEMPERATURE: 98 F | OXYGEN SATURATION: 100 % | DIASTOLIC BLOOD PRESSURE: 81 MMHG | RESPIRATION RATE: 15 BRPM | SYSTOLIC BLOOD PRESSURE: 135 MMHG | BODY MASS INDEX: 39.27 KG/M2 | HEART RATE: 100 BPM

## 2022-05-22 DIAGNOSIS — R56.9 SEIZURE-LIKE ACTIVITY (HCC): Primary | ICD-10-CM

## 2022-05-22 LAB
AMPHETAMINE SCREEN, URINE: POSITIVE
BARBITURATE SCREEN URINE: ABNORMAL
BENZODIAZEPINE SCREEN, URINE: ABNORMAL
BILIRUBIN URINE: NEGATIVE
BLOOD, URINE: NEGATIVE
BUPRENORPHINE URINE: ABNORMAL
CANNABINOID SCREEN URINE: POSITIVE
CLARITY: CLEAR
COCAINE METABOLITE SCREEN URINE: ABNORMAL
COLOR: YELLOW
GLUCOSE URINE: NEGATIVE MG/DL
HCG(URINE) PREGNANCY TEST: NEGATIVE
KETONES, URINE: NEGATIVE MG/DL
LEUKOCYTE ESTERASE, URINE: NEGATIVE
Lab: ABNORMAL
METHADONE SCREEN, URINE: ABNORMAL
MICROSCOPIC EXAMINATION: NORMAL
NITRITE, URINE: NEGATIVE
OPIATE SCREEN URINE: ABNORMAL
OXYCODONE URINE: ABNORMAL
PH UA: 6.5
PH UA: 6.5 (ref 5–8)
PHENCYCLIDINE SCREEN URINE: ABNORMAL
PROPOXYPHENE SCREEN: ABNORMAL
PROTEIN UA: NEGATIVE MG/DL
S PYO AG THROAT QL: NEGATIVE
SARS-COV-2, NAAT: NOT DETECTED
SPECIFIC GRAVITY UA: 1.02 (ref 1–1.03)
URINE TYPE: NORMAL
UROBILINOGEN, URINE: 0.2 E.U./DL

## 2022-05-22 PROCEDURE — 80183 DRUG SCRN QUANT OXCARBAZEPIN: CPT

## 2022-05-22 PROCEDURE — 6370000000 HC RX 637 (ALT 250 FOR IP): Performed by: PHYSICIAN ASSISTANT

## 2022-05-22 PROCEDURE — 87086 URINE CULTURE/COLONY COUNT: CPT

## 2022-05-22 PROCEDURE — 87880 STREP A ASSAY W/OPTIC: CPT

## 2022-05-22 PROCEDURE — 81003 URINALYSIS AUTO W/O SCOPE: CPT

## 2022-05-22 PROCEDURE — 87081 CULTURE SCREEN ONLY: CPT

## 2022-05-22 PROCEDURE — 80307 DRUG TEST PRSMV CHEM ANLYZR: CPT

## 2022-05-22 PROCEDURE — 87635 SARS-COV-2 COVID-19 AMP PRB: CPT

## 2022-05-22 PROCEDURE — 84703 CHORIONIC GONADOTROPIN ASSAY: CPT

## 2022-05-22 PROCEDURE — 99284 EMERGENCY DEPT VISIT MOD MDM: CPT

## 2022-05-22 RX ORDER — OXCARBAZEPINE 150 MG/1
300 TABLET, FILM COATED ORAL ONCE
Status: COMPLETED | OUTPATIENT
Start: 2022-05-22 | End: 2022-05-22

## 2022-05-22 RX ORDER — OXCARBAZEPINE 150 MG/1
300 TABLET, FILM COATED ORAL 2 TIMES DAILY
Status: DISCONTINUED | OUTPATIENT
Start: 2022-05-22 | End: 2022-05-22

## 2022-05-22 RX ORDER — OXCARBAZEPINE 150 MG/1
300 TABLET, FILM COATED ORAL 2 TIMES DAILY
Status: DISCONTINUED | OUTPATIENT
Start: 2022-05-22 | End: 2022-05-22 | Stop reason: HOSPADM

## 2022-05-22 RX ADMIN — OXCARBAZEPINE 300 MG: 150 TABLET, FILM COATED ORAL at 20:34

## 2022-05-22 RX ADMIN — OXCARBAZEPINE 300 MG: 150 TABLET, FILM COATED ORAL at 20:33

## 2022-05-22 ASSESSMENT — PAIN - FUNCTIONAL ASSESSMENT
PAIN_FUNCTIONAL_ASSESSMENT: 0-10
PAIN_FUNCTIONAL_ASSESSMENT: NONE - DENIES PAIN

## 2022-05-22 ASSESSMENT — PAIN SCALES - GENERAL: PAINLEVEL_OUTOF10: 3

## 2022-05-23 PROBLEM — R56.9 SEIZURE (HCC): Status: ACTIVE | Noted: 2021-09-09

## 2022-05-23 PROBLEM — E61.1 IRON DEFICIENCY: Status: ACTIVE | Noted: 2018-06-16

## 2022-05-23 LAB — URINE CULTURE, ROUTINE: NORMAL

## 2022-05-23 RX ORDER — BUSPIRONE HYDROCHLORIDE 10 MG/1
15 TABLET ORAL 3 TIMES DAILY
COMMUNITY
End: 2022-06-29

## 2022-05-23 RX ORDER — CYCLOBENZAPRINE HYDROCHLORIDE 7.5 MG/1
7.5 TABLET, FILM COATED ORAL 3 TIMES DAILY PRN
COMMUNITY
End: 2022-05-24

## 2022-05-23 RX ORDER — TRAZODONE HYDROCHLORIDE 100 MG/1
100 TABLET ORAL NIGHTLY
COMMUNITY
End: 2022-06-29

## 2022-05-23 RX ORDER — SERTRALINE HYDROCHLORIDE 100 MG/1
100 TABLET, FILM COATED ORAL DAILY
COMMUNITY
End: 2022-05-24

## 2022-05-23 RX ORDER — FERROUS SULFATE 325(65) MG
325 TABLET ORAL
COMMUNITY
End: 2022-05-24 | Stop reason: SDUPTHER

## 2022-05-23 RX ORDER — LORATADINE AND PSEUDOEPHEDRINE 10; 240 MG/1; MG/1
1 TABLET, EXTENDED RELEASE ORAL DAILY
COMMUNITY
Start: 2021-07-19

## 2022-05-23 RX ORDER — LORATADINE 10 MG/1
10 TABLET ORAL DAILY
COMMUNITY
End: 2022-05-23

## 2022-05-23 RX ORDER — CLONAZEPAM 0.5 MG/1
0.5 TABLET ORAL NIGHTLY
COMMUNITY
Start: 2022-02-25 | End: 2022-05-24

## 2022-05-23 RX ORDER — PRAZOSIN HYDROCHLORIDE 1 MG/1
1 CAPSULE ORAL NIGHTLY
COMMUNITY
Start: 2022-02-25

## 2022-05-23 RX ORDER — MECLIZINE HYDROCHLORIDE CHEWABLE TABLETS 25 MG/1
25 TABLET, CHEWABLE ORAL 3 TIMES DAILY PRN
COMMUNITY
End: 2022-05-24

## 2022-05-23 RX ORDER — MELOXICAM 15 MG/1
15 TABLET ORAL DAILY
COMMUNITY
End: 2022-05-24

## 2022-05-23 NOTE — ED PROVIDER NOTES
[facility]  ED      CHIEF COMPLAINT  Seizures (Patient has known hx of seizures, takes trileptal took morning dose, Per patient's wife pt had one last night lasting 3 minutes had 3 seizures today around 1-2 minutes each in [employer] parking lot. Patient is lethargic at triage but answering appropriately. Cannot get into neuro until august.)      SHARED SERVICE VISIT  Evaluated by DORA. My supervising physician was available for consultation. HISTORY OF PRESENT ILLNESS  Bettina Fernandez is a 33 y.o. female history of nonepileptic seizure, phthisis disorder, chronic pain disorder, ADHD, polypharmacy, Johnie-Danlos syndrome presents the emergency department for evaluation of concerns for seizure-like activity. Patient's wife is at bedside and states that she was in the car whenever she did not respond to the questioning. She states that the patient then had a brief episode of amnesia which is typical of her nonepileptic seizures. Wife at bedside states this is usually brought on by stress reports this has been happening more frequently. They have an appointment with neurology but is unable to get seen till August and requesting follow-up with a different service if possible. They have an appointment scheduled on next week with the family medicine residency group. Patient has taken her morning dose of her medication but not her evening dose of oxcarbazepine. Patient's wife also briefly stated that she did complain of a sore throat yesterday as well as possible urinary tract infection however patient is denying any urinary symptoms. No other complaints, modifying factors or associated symptoms. Nursing notes reviewed.    Past Medical History:   Diagnosis Date    ADHD (attention deficit hyperactivity disorder)     Anxiety     Asthma     Autism     Chronic back pain     Depression     Johnie-Danlos syndrome     Seizures (HCC)      Past Surgical History:   Procedure Laterality Date  ANKLE FRACTURE SURGERY Right 05/2021    CHOLECYSTECTOMY      EYE SURGERY      tear ducts    WRIST GANGLION EXCISION       Family History   Problem Relation Age of Onset    Diabetes Father     High Blood Pressure Father     Heart Disease Father     High Blood Pressure Paternal Grandmother     Diabetes Paternal Grandmother     Stroke Paternal Grandmother     Depression Paternal Grandmother     Dementia Paternal Grandmother     Heart Disease Paternal Grandmother      Social History     Socioeconomic History    Marital status:      Spouse name: Lorelei Heredia Number of children: 3    Years of education: Not on file    Highest education level: Not on file   Occupational History    Occupation: barista   Tobacco Use    Smoking status: Never Smoker    Smokeless tobacco: Never Used   Vaping Use    Vaping Use: Every day    Substances: Nicotine, Flavoring, D-8    Substance and Sexual Activity    Alcohol use: No    Drug use: Yes     Types: Marijuana Margette Syed)    Sexual activity: Yes     Partners: Male   Other Topics Concern    Not on file   Social History Narrative    Not on file     Social Determinants of Health     Financial Resource Strain:     Difficulty of Paying Living Expenses: Not on file   Food Insecurity:     Worried About Running Out of Food in the Last Year: Not on file    Joaquín of Food in the Last Year: Not on file   Transportation Needs:     Lack of Transportation (Medical): Not on file    Lack of Transportation (Non-Medical):  Not on file   Physical Activity:     Days of Exercise per Week: Not on file    Minutes of Exercise per Session: Not on file   Stress:     Feeling of Stress : Not on file   Social Connections:     Frequency of Communication with Friends and Family: Not on file    Frequency of Social Gatherings with Friends and Family: Not on file    Attends Voodoo Services: Not on file    Active Member of Clubs or Organizations: Not on file    Attends Club or Organization Meetings: Not on file    Marital Status: Not on file   Intimate Partner Violence:     Fear of Current or Ex-Partner: Not on file    Emotionally Abused: Not on file    Physically Abused: Not on file    Sexually Abused: Not on file   Housing Stability:     Unable to Pay for Housing in the Last Year: Not on file    Number of [place] in the Last Year: Not on file    Unstable Housing in the Last Year: Not on file     No current facility-administered medications for this encounter. Current Outpatient Medications   Medication Sig Dispense Refill    mirtazapine (REMERON) 30 MG tablet Take 30 mg by mouth nightly      risperiDONE (RISPERDAL) 1 MG tablet Take 1 mg by mouth 2 times daily      LORazepam (ATIVAN PO) Take 2.5 mg by mouth      medical marijuana Take by mouth as needed.  ibuprofen (ADVIL;MOTRIN) 800 MG tablet TAKE ONE TABLET BY MOUTH TWICE A DAY AS NEEDED FOR PAIN 60 tablet 0    budesonide-formoterol (SYMBICORT) 80-4.5 MCG/ACT AERO Inhale 2 puffs into the lungs 2 times daily 10.2 g 3    citalopram (CELEXA) 10 MG tablet Take 10 mg by mouth daily (Patient not taking: Reported on 5/5/2022)      escitalopram (LEXAPRO) 10 MG tablet Take 10 mg by mouth daily (Patient not taking: Reported on 5/5/2022)      amphetamine-dextroamphetamine (ADDERALL XR) 30 MG extended release capsule Take 30 mg by mouth every morning.  amphetamine-dextroamphetamine (ADDERALL, 15MG,) 15 MG tablet Take 15 mg by mouth 2 times daily.  tiZANidine (ZANAFLEX) 4 MG tablet Take 1 tablet by mouth daily as needed (muscle spasms) (Patient taking differently: Take 4 mg by mouth 3 times daily ) 30 tablet 0    famotidine (PEPCID) 20 MG tablet Take 1 tablet by mouth 2 times daily 60 tablet 3    hydrOXYzine (VISTARIL) 25 MG capsule Take 50 mg by mouth 3 times daily as needed for Itching       gabapentin (NEURONTIN) 800 MG tablet Take 1 tablet by mouth 3 times daily for 30 days.  90 tablet 2    PRAZOSIN HCL PO Take 4 mg by mouth       OXcarbazepine (TRILEPTAL) 600 MG tablet Take 1 tablet by mouth 2 times daily 60 tablet 2    albuterol sulfate HFA (VENTOLIN HFA) 108 (90 Base) MCG/ACT inhaler Inhale 2 puffs into the lungs 4 times daily as needed for Wheezing 18 g 5    EPINEPHrine (EPIPEN 2-KOREY) 0.3 MG/0.3ML SOAJ injection Inject 0.3 mLs into the skin once for 1 dose Use as directed for allergic reaction 0.3 mL 0    Cholecalciferol (VITAMIN D3) 97565 UNITS CAPS Take by mouth        Allergies   Allergen Reactions    Tree Nut [Macadamia Nut Oil] Hives and Itching    Azithromycin     Keppra [Levetiracetam]      Extreme rage    Levaquin [Levofloxacin]      Nerve and muscle issues    Lexapro [Escitalopram]     Wellbutrin [Bupropion] Other (See Comments)     Suicidal ideation    Pcn [Penicillins] Rash       REVIEW OF SYSTEMS  10 systems reviewed, pertinent positives per HPI otherwise noted to be negative    PHYSICAL EXAM  /81   Pulse 100   Temp 98 °F (36.7 °C) (Oral)   Resp 15   Ht 5' 4\" (1.626 m)   Wt 230 lb (104.3 kg)   LMP 05/08/2022   SpO2 100%   BMI 39.48 kg/m²   GENERAL APPEARANCE: Awake and alert. Cooperative. HEAD: Normocephalic. Atraumatic. EYES: EOM's grossly intact. ENT: Mucous membranes are moist.  Oropharynx is clear nonedematous nonerythematous. Uvula midline. No abscess appreciated. Tympanic membranes are pearly gray, no erythema. NECK: Supple. HEART: Regular rate. LUNGS: Respirations unlabored. EXTREMITIES: No peripheral edema. Moves all extremities equally. All extremities neurovascularly intact. SKIN: Warm and dry. No acute rashes. NEUROLOGICAL: Alert and oriented. CN's 2-12 intact. No gross facial drooping. Strength 5/5, sensation intact. PSYCHIATRIC: Normal mood and affect.     RADIOLOGY  No orders to display       LABS  Labs Reviewed   DRUG SCREEN MULTI URINE WITH BUP - Abnormal; Notable for the following components:       Result Value    Amphetamine Screen, Urine POSITIVE (*)     Cannabinoid Scrn, Ur POSITIVE (*)     All other components within normal limits   STREP SCREEN GROUP A THROAT   COVID-19, RAPID   CULTURE, URINE   CULTURE, BETA STREP CONFIRM PLATES   PREGNANCY, URINE   URINALYSIS   OXCARBAZEPINE LEVEL       PROCEDURES  Unless otherwise noted below, none  Procedures    CRITICAL CARE TIME  The total critical care time spent while evaluating and treating this patient was 0 minutes. This excludes time spent doing separately billable procedures. This includes time at the bedside, data interpretation, medication management, obtaining critical history from collateral sources if the patient is unable to provide it directly, and physician consultation. Specifics of interventions taken and potentially life-threatening diagnostic considerations are listed above in the medical decision making. MDM  MDM  29-year-old female with a history of nonepileptic seizures, fictitious disorder, chronic pain disorder, ADHD presents emergency department for evaluation of concerns for recurrence of these nonepileptic seizures. Reports that these are brought on by stress. Vitals within normal limits on arrival symptoms slightly elevated but heart rate at 106. Patient is otherwise nontoxic in appearance. She is alert and oriented. Physical exam is otherwise benign. Urine drug screen positive for amphetamines as well as cannabinoids which is in correlation with the medical marijuana and history of ADHD. COVID-negative. Strep negative. Urinalysis unremarkable. Will send for culture. Will have patient follow-up with her family medicine group next week for results of the seizure medication levels as well as the urine culture. Will provide patient with contact information for establishment of neurology if she is able to get on a quicker date. We will also reach out to case management to request assistance with setting up follow-up.     DISPOSITION  Patient was discharged to home in good condition. CLINICAL IMPRESSION  1.  Seizure-like activity ([facility] [address]            Lady Ike PA-C  05/23/22 0007

## 2022-05-24 ENCOUNTER — HOSPITAL ENCOUNTER (OUTPATIENT)
Dept: PHYSICAL THERAPY | Age: 32
Setting detail: THERAPIES SERIES
Discharge: HOME | End: 2022-05-24

## 2022-05-24 ENCOUNTER — HOSPITAL ENCOUNTER (OUTPATIENT)
Age: 32
Discharge: HOME | End: 2022-05-24
Payer: MEDICAID

## 2022-05-24 ENCOUNTER — OFFICE VISIT (OUTPATIENT)
Dept: PRIMARY CARE CLINIC | Age: 32
End: 2022-05-24
Payer: MEDICAID

## 2022-05-24 VITALS
OXYGEN SATURATION: 96 % | DIASTOLIC BLOOD PRESSURE: 76 MMHG | SYSTOLIC BLOOD PRESSURE: 128 MMHG | RESPIRATION RATE: 18 BRPM | WEIGHT: 233.6 LBS | BODY MASS INDEX: 39.88 KG/M2 | HEIGHT: 64 IN | HEART RATE: 82 BPM | TEMPERATURE: 98.1 F

## 2022-05-24 DIAGNOSIS — E61.1 IRON DEFICIENCY: Primary | ICD-10-CM

## 2022-05-24 DIAGNOSIS — G89.4 CHRONIC PAIN DISORDER: ICD-10-CM

## 2022-05-24 DIAGNOSIS — G40.909 SEIZURE DISORDER (HCC): ICD-10-CM

## 2022-05-24 DIAGNOSIS — F68.10 FACTITIOUS DISORDER: ICD-10-CM

## 2022-05-24 DIAGNOSIS — J02.8 SORE THROAT (VIRAL): ICD-10-CM

## 2022-05-24 DIAGNOSIS — B97.89 SORE THROAT (VIRAL): ICD-10-CM

## 2022-05-24 DIAGNOSIS — M25.50 ARTHRALGIA, UNSPECIFIED JOINT: ICD-10-CM

## 2022-05-24 DIAGNOSIS — R55 SYNCOPE, UNSPECIFIED SYNCOPE TYPE: ICD-10-CM

## 2022-05-24 DIAGNOSIS — D50.8 OTHER IRON DEFICIENCY ANEMIA: ICD-10-CM

## 2022-05-24 DIAGNOSIS — F90.9 ATTENTION DEFICIT HYPERACTIVITY DISORDER (ADHD), UNSPECIFIED ADHD TYPE: ICD-10-CM

## 2022-05-24 LAB
ANION GAP SERPL CALCULATED.3IONS-SCNC: 14 MMOL/L (ref 3–16)
BASOPHILS ABSOLUTE: 0.1 K/UL (ref 0–0.2)
BASOPHILS RELATIVE PERCENT: 0.5 %
BUN BLDV-MCNC: 16 MG/DL (ref 7–20)
CALCIUM SERPL-MCNC: 9.2 MG/DL (ref 8.3–10.6)
CHLORIDE BLD-SCNC: 103 MMOL/L (ref 99–110)
CO2: 24 MMOL/L (ref 21–32)
CREAT SERPL-MCNC: 0.6 MG/DL (ref 0.6–1.1)
EOSINOPHILS ABSOLUTE: 0.1 K/UL (ref 0–0.6)
EOSINOPHILS RELATIVE PERCENT: 1.1 %
FERRITIN: 61.6 NG/ML (ref 15–150)
GFR AFRICAN AMERICAN: >60
GFR NON-AFRICAN AMERICAN: >60
GLUCOSE BLD-MCNC: 91 MG/DL (ref 70–99)
HCT VFR BLD CALC: 32.9 % (ref 36–48)
HEMOGLOBIN: 11 G/DL (ref 12–16)
IRON SATURATION: 10 % (ref 15–50)
IRON: 25 UG/DL (ref 37–145)
LYMPHOCYTES ABSOLUTE: 1.8 K/UL (ref 1–5.1)
LYMPHOCYTES RELATIVE PERCENT: 17.3 %
MCH RBC QN AUTO: 27.5 PG (ref 26–34)
MCHC RBC AUTO-ENTMCNC: 33.5 G/DL (ref 31–36)
MCV RBC AUTO: 81.9 FL (ref 80–100)
MONOCYTES ABSOLUTE: 0.6 K/UL (ref 0–1.3)
MONOCYTES RELATIVE PERCENT: 6.2 %
NEUTROPHILS ABSOLUTE: 7.6 K/UL (ref 1.7–7.7)
NEUTROPHILS RELATIVE PERCENT: 74.9 %
OXCARBAZEPINE: 17 UG/ML (ref 3–35)
PDW BLD-RTO: 14.3 % (ref 12.4–15.4)
PLATELET # BLD: 286 K/UL (ref 135–450)
PMV BLD AUTO: 8.4 FL (ref 5–10.5)
POTASSIUM SERPL-SCNC: 4.2 MMOL/L (ref 3.5–5.1)
RBC # BLD: 4.02 M/UL (ref 4–5.2)
S PYO THROAT QL CULT: NORMAL
SODIUM BLD-SCNC: 141 MMOL/L (ref 136–145)
TOTAL IRON BINDING CAPACITY: 254 UG/DL (ref 260–445)
WBC # BLD: 10.2 K/UL (ref 4–11)

## 2022-05-24 PROCEDURE — 82746 ASSAY OF FOLIC ACID SERUM: CPT

## 2022-05-24 PROCEDURE — 82306 VITAMIN D 25 HYDROXY: CPT

## 2022-05-24 PROCEDURE — 85025 COMPLETE CBC W/AUTO DIFF WBC: CPT

## 2022-05-24 PROCEDURE — 99214 OFFICE O/P EST MOD 30 MIN: CPT | Performed by: STUDENT IN AN ORGANIZED HEALTH CARE EDUCATION/TRAINING PROGRAM

## 2022-05-24 PROCEDURE — 82728 ASSAY OF FERRITIN: CPT

## 2022-05-24 PROCEDURE — 36415 COLL VENOUS BLD VENIPUNCTURE: CPT

## 2022-05-24 PROCEDURE — 82607 VITAMIN B-12: CPT

## 2022-05-24 PROCEDURE — 83540 ASSAY OF IRON: CPT

## 2022-05-24 PROCEDURE — 83550 IRON BINDING TEST: CPT

## 2022-05-24 PROCEDURE — 80048 BASIC METABOLIC PNL TOTAL CA: CPT

## 2022-05-24 RX ORDER — FERROUS SULFATE 325(65) MG
325 TABLET ORAL
Qty: 30 TABLET | Refills: 1 | Status: SHIPPED | OUTPATIENT
Start: 2022-05-24 | End: 2022-07-20

## 2022-05-24 RX ORDER — OXCARBAZEPINE 600 MG/1
600 TABLET, FILM COATED ORAL 2 TIMES DAILY
Qty: 60 TABLET | Refills: 2 | Status: SHIPPED | OUTPATIENT
Start: 2022-05-24 | End: 2022-09-09

## 2022-05-24 RX ORDER — TIZANIDINE 4 MG/1
4 TABLET ORAL 2 TIMES DAILY PRN
Qty: 30 TABLET | Refills: 1 | Status: SHIPPED | OUTPATIENT
Start: 2022-05-24 | End: 2022-06-23

## 2022-05-24 NOTE — PROGRESS NOTES
Juan Kerr. [address] [address]         Phone: [phone]      Name:  Jose Luis Nicholson  :    1990    Consultants:   Patient Care Team:  Eddy Augustin MD as PCP - General (Family Medicine)    Chief Complaint:     Jose Luis Nicholson is a 33 y.o. female  who presents today for a New Patient care visit with Personalized Prevention Plan Services as noted below. Chief Complaint   Patient presents with    New Patient     est care    ADHD     HPI:     Jose Luis Nicholson is a 33 y.o. female who presents to the practice today to establish care. She has a PMH of EDS, iron deficiency, arthralgias, seizures, and ADHD. 3-4 days sore throat. She had a strep test which was negative. Has not been taking anything at home for relieve. No fevers, shakes, or chills. Does not have any sick contacts at home. She has not been taking her iron pills for iron deficiency due to constipation. She has been having syncopal episodes. Denies headaches,palpitations, or lightheadedness. For her pain disorder, she takes tizanidine. She has an appointment set up with pain management. She has swelling and arthralgia in her right ankle. She follows with PT. She takes ibuprofen with no relief. She would like to see a ortho or sports medicine doctor.        Patient Active Problem List   Diagnosis    Other chronic sinusitis    Chronic pain of right ankle    Mild intermittent asthma without complication    Seizure ([facility] [address]    Chronic pain disorder    Attention deficit hyperactivity disorder (ADHD)    Anxiety and depression    Polypharmacy    Johnie-Danlos syndrome    Factitious disorder    Class 2 severe obesity due to excess calories with serious comorbidity and body mass index (BMI) of 39.0 to 39.9 in adult [facility])    Iron deficiency Past Medical History:    Past Medical History:   Diagnosis Date    ADHD (attention deficit hyperactivity disorder)     Anxiety     Asthma     Autism     Chronic back pain     Depression     Johnie-Danlos syndrome     Seizures (HCC)      Past Surgical History:  Past Surgical History:   Procedure Laterality Date    ANKLE FRACTURE SURGERY Right 05/2021    CHOLECYSTECTOMY      EYE SURGERY      tear ducts    WRIST GANGLION EXCISION         Home Meds:  Prior to Visit Medications    Medication Sig Taking? Authorizing Provider   tiZANidine (ZANAFLEX) 4 MG tablet Take 1 tablet by mouth 2 times daily as needed (muscle spasms) Yes Jessica Tatum MD   ferrous sulfate (IRON 325) 325 (65 Fe) MG tablet Take 1 tablet by mouth daily (with breakfast) Yes Jessica Tatum MD   OXcarbazepine (TRILEPTAL) 600 MG tablet Take 1 tablet by mouth 2 times daily Yes Jessica Tatum MD   loratadine-pseudoephedrine (CLARITIN-D 24HR)  MG per extended release tablet Take 1 tablet by mouth daily Yes Historical Provider, MD   prazosin (MINIPRESS) 1 MG capsule Take 1 mg by mouth nightly Take 3 capsules by mouth at bedtime  Historical Provider, MD   Multiple Vitamin (MULTIVITAMIN ADULT PO) Take 1 capsule by mouth daily  Historical Provider, MD   busPIRone (BUSPAR) 10 MG tablet Take 15 mg by mouth 3 times daily  Historical Provider, MD   traZODone (DESYREL) 100 MG tablet Take 100 mg by mouth nightly  Historical Provider, MD   mirtazapine (REMERON) 30 MG tablet Take 30 mg by mouth nightly  Historical Provider, MD   risperiDONE (RISPERDAL) 1 MG tablet Take 1 mg by mouth 2 times daily  Historical Provider, MD   LORazepam (ATIVAN PO) Take 2.5 mg by mouth  Historical Provider, MD   medical marijuana Take by mouth as needed.   Historical Provider, MD   ibuprofen (ADVIL;MOTRIN) 800 MG tablet TAKE ONE TABLET BY MOUTH TWICE A DAY AS NEEDED FOR PAIN  Naheed Nuñez,    budesonide-formoterol Drug Use     Drug Use Status  Yes Types  Marijuana (Weed)          Sexual Activity     Sexually Active  Yes Partners  Male               Health Maintenance Completed:  Health Maintenance   Topic Date Due    Varicella vaccine (1 of 2 - 2-dose childhood series) Never done    COVID-19 Vaccine (1) Never done    Pneumococcal 0-64 years Vaccine (1 - PCV) Never done    HIV screen  Never done    Hepatitis C screen  Never done    DTaP/Tdap/Td vaccine (1 - Tdap) Never done    Cervical cancer screen  Never done    Flu vaccine (Season Ended) 09/01/2022    Depression Monitoring  05/05/2023    Hepatitis A vaccine  Aged Out    Hepatitis B vaccine  Aged Out    Hib vaccine  Aged Out    Meningococcal (ACWY) vaccine  Aged Out      There is no immunization history for the selected administration types on file for this patient. Review of Systems:  Review of Systems   Constitutional: Negative. HENT: Positive for congestion and sore throat. Eyes: Negative. Respiratory: Negative. Cardiovascular: Negative. Gastrointestinal: Negative. Genitourinary: Negative. Musculoskeletal: Negative. Skin: Negative. Neurological: Positive for seizures and syncope. Psychiatric/Behavioral: The patient is nervous/anxious. Physical Exam:   Vitals:    05/24/22 1340   BP: 128/76   Site: Left Upper Arm   Position: Sitting   Cuff Size: Large Adult   Pulse: 82   Resp: 18   Temp: 98.1 °F (36.7 °C)   TempSrc: Infrared   SpO2: 96%   Weight: 233 lb 9.6 oz (106 kg)   Height: 5' 4\" (1.626 m)     Body mass index is 40.1 kg/m². Wt Readings from Last 3 Encounters:   05/24/22 233 lb 9.6 oz (106 kg)   05/22/22 230 lb (104.3 kg)   05/05/22 231 lb 12.8 oz (105.1 kg)     BP Readings from Last 3 Encounters:   05/24/22 128/76   05/22/22 135/81   05/05/22 116/76     Physical Exam  Constitutional:       Appearance: Normal appearance. She is obese. HENT:      Head: Normocephalic and atraumatic.       Right Ear: Tympanic membrane, ear canal and external ear normal.      Left Ear: Tympanic membrane, ear canal and external ear normal.      Nose: Nose normal.      Mouth/Throat:      Mouth: Mucous membranes are moist.      Pharynx: Oropharynx is clear. Eyes:      Extraocular Movements: Extraocular movements intact. Conjunctiva/sclera: Conjunctivae normal.      Pupils: Pupils are equal, round, and reactive to light. Cardiovascular:      Rate and Rhythm: Normal rate and regular rhythm. Heart sounds: Normal heart sounds. Pulmonary:      Effort: Pulmonary effort is normal.      Breath sounds: Normal breath sounds. Abdominal:      General: Bowel sounds are normal.      Palpations: Abdomen is soft. Musculoskeletal:         General: Normal range of motion. Cervical back: Normal range of motion. Skin:     General: Skin is warm and dry. Neurological:      General: No focal deficit present. Mental Status: She is alert and oriented to person, place, and time. Lab Review:   Lab Results   Component Value Date     05/24/2022    K 4.2 05/24/2022    K 4.6 04/25/2022     05/24/2022    CO2 24 05/24/2022    BUN 16 05/24/2022    CREATININE 0.6 05/24/2022    GLUCOSE 91 05/24/2022    CALCIUM 9.2 05/24/2022     No results found for: CKTOTAL, CKMB, CKMBINDEX, TROPONINI  Lab Results   Component Value Date    WBC 10.2 05/24/2022    HGB 11.0 05/24/2022    HCT 32.9 05/24/2022    MCV 81.9 05/24/2022     05/24/2022     Lab Results   Component Value Date    CHOL 158 04/01/2022    TRIG 138 04/01/2022    HDL 53 04/01/2022       Assessment/Plan:   Diagnosis Orders   1. Iron deficiency  Vitamin B12 & Folate    CBC with Auto Differential    ferrous sulfate (IRON 325) 325 (65 Fe) MG tablet    Iron and TIBC    Ferritin   2. Chronic pain disorder  tiZANidine (ZANAFLEX) 4 MG tablet    Vitamin D 25 Hydroxy   3. Syncope, unspecified syncope type  Holter Monitor 48 Hour   4.  Arthralgia, unspecified joint  Media Sensor, Abilio Alfaro MD, Orthopedic Surgery (Primary Care Sports Medicine), [facility]   5. Seizure disorder (HCC)  Basic Metabolic Panel    OXcarbazepine (TRILEPTAL) 600 MG tablet   6. Attention deficit hyperactivity disorder (ADHD), unspecified ADHD type     7. Factitious disorder       Kimberly Fierro is a 33 y.o. female who presents to the practice today to establish care. She has a PMH of EDS, iron deficiency, arthralgias, seizures, and ADHD. Iron deficiency anemia   -Not at goal. Patient not taking iron pills due to constipation   -Ordered CBC, TIBC, ferritin, iron, vitamin B12, and folate   -Start iron pills   -Discussed treatment for constipation OTC    Sore throat   -Most likely viral. Patient is strep negative   -Counseled on supportive care  -Return as needed     Chronic pain disorder   - Not at goal.  Patient has an appointment with the pain management specialist.  - Refilled tizanidine for muscle spasms  - Return to clinic as needed     Syncope   -Not at goal. Need to differentiate between cardiogenic etiology or due to anemia  -Ordered holter monitor   -Return to clinic in 2-3 weeks     Right ankle swelling/arthralgia   -Not at goal, patient requested to see specialist for swelling in right ankle and ankle pain   -Referred to Dr. Guadalupe Mcguire   -Return as needed     Seizures  - Not at goal. Patient has a neurology appointment scheduled. Patient was historically on trileptal  - Trileptal just for this visit until she can have second opinion by in August at [facility].  - Return to clinic in 2 to 3 weeks    ADHD  - Discussed with patient that she would need to be formally evaluated by a psychologist and I need to review the paperwork prior to prescribing any medications  - Did not refill any ADHD medications today  - Patient filled out record release form  - Return to clinic in 2 to 3 weeks    Factitious disorder imposed on another  - Not at goal. PATITO and her wife seem to have a common diagnoses.   Due to record review, I feel this could be a possible diagnosis for Maria D Smith and her wife. - Want patient to be evaluated by psychologist   -Currently follows with psychiatry. Will request records   -Will bring this up at the next visit  - Return to clinic in 2 to 3 weeks    Healthcare maintenance  -Not at goal, due to time constraints and patient problems, did not fully address   -HIV and Hep C negative, in care everywhere   -Return to clinic in 2-3 weeks to address vaccines    Health Maintenance Due:  Health Maintenance Due   Topic Date Due    Varicella vaccine (1 of 2 - 2-dose childhood series) Never done    COVID-19 Vaccine (1) Never done    Pneumococcal 0-64 years Vaccine (1 - PCV) Never done    HIV screen  Never done    Hepatitis C screen  Never done    DTaP/Tdap/Td vaccine (1 - Tdap) Never done    Cervical cancer screen  Never done      Health care decision maker:  <72years old    Health Maintenance: (USPSTF Recommendations)  (F) Breast Cancer Screen: (40-49 (C), 50-74 biennial screening mammogram (B)): Not indicated at this time  (F) Cervical Cancer Screen: (21-29 q3yr cytology alone; 30-65 q3yr cytology alone, q5yr with hrHPV alone, or q5yr cytology+hrHPV (A)): Needs  CRC/Colonoscopy Screening: (adults 45-49 (B), 50-75 (A)): Not at this time   Lung Ca Screening: Annual LDCT (+smoker age 49-80, smoked within 15 years, total of 20 pack yr history (B)): Not indicated at this time   DEXA Screen: (women >65 and older, <65 if at risk/postmenopausal (B)): Not indicated at this time   HIV Screen: (15-65 yr old, and all pregnant patients (A)): Non reactive 7/19/21  Hep C Screen: (18-79 yr old (B)): Non reactive 7/19/21  [facility] [address] Screen: (all pts with cirrhosis and high risk Hep B (US q6 mo)):  Immunizations:    RTC:  Return in about 3 weeks (around 6/14/2022). EMR Dragon/transcription disclaimer:  Much of this encounter note is electronic transcription/translation of spoken language to printed texts.   The electronic translation of spoken language may be erroneous, or at times, nonsensical words or phrases may be inadvertently transcribed.   Although I have reviewed the note for such errors, some may still exist.       Brittanie Kang MD  PGY-1 Resident  [facility] Medicine Residency

## 2022-05-24 NOTE — PROGRESS NOTES
Physical Therapy  Cancellation/No-show Note  Patient Name:  Kimberly Fierro  :  1990   Date:  2022  Cancels to date: 2  No-shows to date: 1    For today's appointment patient:  [x] Cancelled  [] Rescheduled appointment  [] No-show     Reason given by patient:  [x] Patient ill  [] Conflicting appointment  [] No transportation    [] Conflict with work  [] No reason given  [] Other:     Comments:      Electronically signed by:  Carey Clark PT

## 2022-05-25 LAB
FOLATE: 16.87 NG/ML (ref 4.78–24.2)
VITAMIN B-12: 624 PG/ML (ref 211–911)
VITAMIN D 25-HYDROXY: 27.6 NG/ML

## 2022-05-25 ASSESSMENT — ENCOUNTER SYMPTOMS
GASTROINTESTINAL NEGATIVE: 1
EYES NEGATIVE: 1
SORE THROAT: 1
RESPIRATORY NEGATIVE: 1

## 2022-05-26 ENCOUNTER — HOSPITAL ENCOUNTER (OUTPATIENT)
Dept: PHYSICAL THERAPY | Age: 32
Setting detail: THERAPIES SERIES
Discharge: HOME | End: 2022-05-26

## 2022-05-26 NOTE — PROGRESS NOTES
Physical Therapy  Cancellation/No-show Note  Patient Name:  Shannan Mustafa  :  1990   Date:  2022  Cancels to date: 3  No-shows to date: 1    For today's appointment patient:  [x] Cancelled  [] Rescheduled appointment  [] No-show     Reason given by patient:  [x] Patient ill  [] Conflicting appointment  [] No transportation    [] Conflict with work  [] No reason given  [] Other:     Comments:      Electronically signed by:  Jose Mcclain PT

## 2022-05-27 ENCOUNTER — TELEMEDICINE (OUTPATIENT)
Dept: INTERNAL MEDICINE CLINIC | Age: 32
End: 2022-05-27
Payer: MEDICAID

## 2022-05-27 DIAGNOSIS — J01.00 ACUTE MAXILLARY SINUSITIS, RECURRENCE NOT SPECIFIED: Primary | ICD-10-CM

## 2022-05-27 PROCEDURE — 99214 OFFICE O/P EST MOD 30 MIN: CPT | Performed by: NURSE PRACTITIONER

## 2022-05-27 RX ORDER — DOXYCYCLINE HYCLATE 100 MG
100 TABLET ORAL 2 TIMES DAILY
Qty: 14 TABLET | Refills: 0 | Status: SHIPPED | OUTPATIENT
Start: 2022-05-27 | End: 2022-06-03

## 2022-05-27 ASSESSMENT — ENCOUNTER SYMPTOMS
SORE THROAT: 1
RESPIRATORY NEGATIVE: 1
GASTROINTESTINAL NEGATIVE: 1
SINUS PRESSURE: 1
TROUBLE SWALLOWING: 0

## 2022-05-27 NOTE — PROGRESS NOTES
2022    TELEHEALTH EVALUATION -- Audio/Visual (During HSAJQ-65 public health emergency)    HPI: Presents virtually c/o sinus pressure, PND, and ear pressure. Started several days ago. 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 Tried Sudafed which helps. Harman Cosme (:  1990) has requested an audio/video evaluation for the following concern(s):    Sinus pressure    Review of Systems   Constitutional: Negative. HENT: Positive for congestion, postnasal drip, sinus pressure and sore throat. Negative for trouble swallowing. Ear pain: ear fullness. Respiratory: Negative. Cardiovascular: Negative. Gastrointestinal: Negative. Genitourinary: Negative. Musculoskeletal: Negative. Skin: Negative. Neurological: Negative. Psychiatric/Behavioral: Negative. Prior to Visit Medications    Medication Sig Taking?  Authorizing Provider   doxycycline hyclate (VIBRA-TABS) 100 MG tablet Take 1 tablet by mouth 2 times daily for 7 days Yes FILOMENA Lopez   tiZANidine (ZANAFLEX) 4 MG tablet Take 1 tablet by mouth 2 times daily as needed (muscle spasms)  Jessica Tatum MD   ferrous sulfate (IRON 325) 325 (65 Fe) MG tablet Take 1 tablet by mouth daily (with breakfast)  Jessica Tatum MD   OXcarbazepine (TRILEPTAL) 600 MG tablet Take 1 tablet by mouth 2 times daily  Jessica Tatum MD   prazosin (MINIPRESS) 1 MG capsule Take 1 mg by mouth nightly Take 3 capsules by mouth at bedtime  Historical Provider, MD   Multiple Vitamin (MULTIVITAMIN ADULT PO) Take 1 capsule by mouth daily  Historical Provider, MD   busPIRone (BUSPAR) 10 MG tablet Take 15 mg by mouth 3 times daily  Historical Provider, MD   loratadine-pseudoephedrine (CLARITIN-D 24HR)  MG per extended release tablet Take 1 tablet by mouth daily  Historical Provider, MD   traZODone (DESYREL) 100 MG tablet Take 100 mg by mouth nightly  Historical Provider, MD   mirtazapine (REMERON) 30 MG tablet Take 30 mg by mouth nightly  Historical Provider, MD   risperiDONE (RISPERDAL) 1 MG tablet Take 1 mg by mouth 2 times daily  Historical Provider, MD   LORazepam (ATIVAN PO) Take 2.5 mg by mouth  Historical Provider, MD   medical marijuana Take by mouth as needed. Historical Provider, MD   ibuprofen (ADVIL;MOTRIN) 800 MG tablet TAKE ONE TABLET BY MOUTH TWICE A DAY AS NEEDED FOR PAIN  Naheed Nuñez DO   budesonide-formoterol (SYMBICORT) 80-4.5 MCG/ACT AERO Inhale 2 puffs into the lungs 2 times daily  Sunlilliana,    amphetamine-dextroamphetamine (ADDERALL XR) 30 MG extended release capsule Take 30 mg by mouth every morning. Historical Provider, MD   amphetamine-dextroamphetamine (ADDERALL, 15MG,) 15 MG tablet Take 15 mg by mouth 2 times daily. Historical Provider, MD   famotidine (PEPCID) 20 MG tablet Take 1 tablet by mouth 2 times daily  KARLA Benavidez   hydrOXYzine (VISTARIL) 25 MG capsule Take 50 mg by mouth 3 times daily as needed for Itching   Historical Provider, MD   gabapentin (NEURONTIN) 800 MG tablet Take 1 tablet by mouth 3 times daily for 30 days.   Naheed Nuñez DO   PRAZOSIN HCL PO Take 4 mg by mouth   Historical Provider, MD   albuterol sulfate HFA (VENTOLIN HFA) 108 (90 Base) MCG/ACT inhaler Inhale 2 puffs into the lungs 4 times daily as needed for Wheezing  Naheed Nuñez DO   EPINEPHrine (EPIPEN 2-KOREY) 0.3 MG/0.3ML SOAJ injection Inject 0.3 mLs into the skin once for 1 dose Use as directed for allergic reaction  Naheed Bellamy DO   Cholecalciferol (VITAMIN D3) 16187 UNITS CAPS Take by mouth   Historical Provider, MD       Social History     Tobacco Use    Smoking status: Never Smoker    Smokeless tobacco: Never Used   Vaping Use    Vaping Use: Every day    Substances: Nicotine, Flavoring, D-8    Substance Use Topics    Alcohol use: No    Drug use: Yes     Types: Marijuana (Weed)        Allergies   Allergen Reactions    Macadamia Nut Oil Hives, Itching and Anaphylaxis    Other Anaphylaxis and Hives     Tree nuts    Penicillins Rash and Hives    Albumen, Egg Other (See Comments)    Erythromycin      Other reaction(s): Loss of Consciousness    Keppra [Levetiracetam]      Extreme rage    Levaquin [Levofloxacin]      Nerve and muscle issues    Lexapro [Escitalopram]     Peanut (Diagnostic) Other (See Comments)    Wellbutrin [Bupropion] Other (See Comments)     Suicidal ideation    Azithromycin Rash   ,   Past Medical History:   Diagnosis Date    ADHD (attention deficit hyperactivity disorder)     Anxiety     Asthma     Autism     Chronic back pain     Depression     Johnie-Danlos syndrome     Seizures (HCC)    ,   Past Surgical History:   Procedure Laterality Date    ANKLE FRACTURE SURGERY Right 05/2021    CHOLECYSTECTOMY      EYE SURGERY      tear ducts    WRIST GANGLION EXCISION     ,   Social History     Tobacco Use    Smoking status: Never Smoker    Smokeless tobacco: Never Used   Vaping Use    Vaping Use: Every day    Substances: Nicotine, Flavoring, D-8    Substance Use Topics    Alcohol use: No    Drug use: Yes     Types: Marijuana (Weed)       PHYSICAL EXAMINATION:  [ INSTRUCTIONS:  \"[x]\" Indicates a positive item  \"[]\" Indicates a negative item  -- DELETE ALL ITEMS NOT EXAMINED]    Constitutional: [x] Appears well-developed and well-nourished [x] No apparent distress      [] Abnormal-   Mental status  [x] Alert and awake  [x] Oriented to person/place/time [x]Able to follow commands      Eyes:  EOM    [x]  Normal  [] Abnormal-  Sclera  [x]  Normal  [] Abnormal -         Discharge [x]  None visible  [] Abnormal -    HENT:   [x] Normocephalic, atraumatic.   [] Abnormal   [x] Mouth/Throat: Mucous membranes are moist.     External Ears [x] Normal  [] Abnormal-     Neck: [x] No visualized mass     Pulmonary/Chest: [x] Respiratory effort normal.  [x] No visualized signs of difficulty breathing or respiratory distress        [] Abnormal-      Musculoskeletal:   [] Normal gait with no signs of ataxia         [x] Normal range of motion of neck        [] Abnormal-       Neurological:        [x] No Facial Asymmetry (Cranial nerve 7 motor function) (limited exam to video visit)          [x] No gaze palsy        [] Abnormal-         Skin:        [x] No significant exanthematous lesions or discoloration noted on facial skin         [] Abnormal-            Psychiatric:       [x] Normal Affect [x] No Hallucinations        [] Abnormal-       ASSESSMENT/PLAN:  1. Acute maxillary sinusitis, recurrence not specified  - doxycycline hyclate (VIBRA-TABS) 100 MG tablet; Take 1 tablet by mouth 2 times daily for 7 days  Dispense: 14 tablet; Refill: 0      Return if symptoms worsen or fail to improve. Dionna Gabriel, was evaluated through a synchronous (real-time) audio-video encounter. The patient (or guardian if applicable) is aware that this is a billable service, which includes applicable co-pays. This Virtual Visit was conducted with patient's (and/or legal guardian's) consent. The visit was conducted pursuant to the emergency declaration under the [address] [place] [address], [address] [place] [address] authority and the [employer] and [employer] General Act. Patient identification was verified, and a caregiver was present when appropriate. The patient was located at Home: [address] [place] [address] Road  [address] [address]. Provider was located at [facility] (Appt Dept): [address]  [address] [facility],  [address] [place] [address]. Total time spent on this encounter: Not billed by time    --FILOMENA Reina on 5/27/2022 at 3:36 PM    An electronic signature was used to authenticate this note.

## 2022-06-01 ENCOUNTER — HOSPITAL ENCOUNTER (OUTPATIENT)
Dept: PHYSICAL THERAPY | Age: 32
Setting detail: THERAPIES SERIES
Discharge: HOME | End: 2022-06-01
Payer: MEDICAID

## 2022-06-01 ENCOUNTER — TELEPHONE (OUTPATIENT)
Dept: PRIMARY CARE CLINIC | Age: 32
End: 2022-06-01

## 2022-06-01 DIAGNOSIS — G89.29 CHRONIC PAIN OF RIGHT ANKLE: ICD-10-CM

## 2022-06-01 DIAGNOSIS — Q79.60 EHLERS-DANLOS SYNDROME: ICD-10-CM

## 2022-06-01 DIAGNOSIS — M25.571 CHRONIC PAIN OF RIGHT ANKLE: ICD-10-CM

## 2022-06-01 DIAGNOSIS — G89.4 CHRONIC PAIN DISORDER: Primary | ICD-10-CM

## 2022-06-01 PROCEDURE — 97140 MANUAL THERAPY 1/> REGIONS: CPT

## 2022-06-01 PROCEDURE — 97110 THERAPEUTIC EXERCISES: CPT

## 2022-06-01 NOTE — TELEPHONE ENCOUNTER
L/m for patient to return my call for more detail about these referral request waiting for return call

## 2022-06-01 NOTE — TELEPHONE ENCOUNTER
Patient called back with further information about referral requests    See below messages. I have pended all referrals and placed the Dx codes for the referrals from patients medical chart.  Please review  For sign off  thank you

## 2022-06-01 NOTE — FLOWSHEET NOTE
today's tx well without pain behaviors.     Treatment/Activity Tolerance:   [x]Patient tolerated treatment well [] Patient limited by fatique  []Patient limited by pain [] Patient limited by other medical complications  [] Other:     Goals:    Short Term Goals  Time Frame for Short term goals: 3 wks  Short term goal 1: Pt will decrease pain by 50% in frequency or intensity  Short term goal 2: Pt will be ind and compliant with HEP  Short term goal 3: Pt will increase R PGM strength to 5/5     Long Term Goals  Long term goal 1: Pt will decrease pain by % in frequency or intensity  Long term goal 2: Pt will deny a functional limitation with standing/ walking due to pain  Long term goal 3: Pt will deny a functional limitation with driving due to pain     Plan: [x] Continue per plan of care [] Alter current plan (see comments)   [] Plan of care initiated [] Hold pending MD visit [] Discharge      Plan for Next Session:      Re-Certification Due Date:         Signature:  Tavares Lindsay PT

## 2022-06-01 NOTE — PROGRESS NOTES
Outpatient Physical Therapy  Phone: [phone] Fax: [phone]     To: Addis Grewal MD          From: Juan Kearns PT   Patient: Roland Melendrez           : 1990  Diagnosis:  Q79.60 Johnie-Danlos Syndrome, J798.442 s/p R ankle ligament repair  Date: 2022      Physical Therapy Certification/Re-Certification Form  Dear Dr. Kay Flores,  The following patient has been evaluated for physical therapy services and for therapy to continue, Medicare requires monthly physician review of the treatment plan. Please review the attached evaluation and/or summary of the patient's plan of care, and verify that you agree therapy should continue by signing the attached document and sending it back to our office. Plan of Care/Treatment to date:   Pt returns to clinic today for her 2nd visit after more than 30 days since her initial eval (due to travel and sickness). [x] Therapeutic Exercise   [] Modalities:  [] Therapeutic Activity     [] Ultrasound  [] Electrical Stimulation   [x] Gait Training      [] Cervical Traction [] Lumbar Traction  [x] Neuromuscular Re-education  [] Hot/Coldpack [] Iontophoresis    [x] Instruction in HEP      Other:  [x] Manual Therapy       []                        [] Aquatic Therapy       []                      ? Frequency/Duration:  # Days per week: [] 1 day # Weeks: [] 1 week [] 5 weeks      [x] 2 days? [] 2 weeks [x] 6 weeks     [] 3 days   [] 3 weeks [] 7 weeks     [] 4 days   [] 4 weeks [] 8 weeks    Rehab Potential: [] Excellent [x] Good [] Fair  [] Poor       Electronically signed by:  Juan Kearns PT      If you have any questions or concerns, please don't hesitate to call.   Thank you for your referral.    Physician Signature:________________________________Date:__________________  By signing above, therapists plan is approved by physician

## 2022-06-01 NOTE — TELEPHONE ENCOUNTER
----- Message from Risa Clarisa sent at 6/1/2022 11:58 AM EDT -----  Subject: Referral Request    QUESTIONS   Reason for referral request? PT would like referral to Pain management,   Rheumatology and Cardiology for specific conditions. Has the physician seen you for this condition before? No   Preferred Specialist (if applicable)? Do you already have an appointment scheduled? No  Additional Information for Provider?   ---------------------------------------------------------------------------  --------------  CALL BACK INFO  What is the best way for the office to contact you? OK to leave message on   voicemail  Preferred Call Back Phone Number? [phone]  ---------------------------------------------------------------------------  --------------  SCRIPT ANSWERS  Relationship to Patient? Other  Representative Name? Barbette Epley  Is the Representative on the appropriate HIPAA document in Epic?  Yes

## 2022-06-02 NOTE — RESULT ENCOUNTER NOTE
Your vitamin D is slightly lower from the cut off at 27.6. Please continue to take your supplements. Your vitamin B12 and folate are within normal limits. Your electrolytes, kidney, and liver are normal.     Your blood tests do indicate that you are anemic as we suspected. It will be important for you to continue to take your iron supplements. Then, we will have them rechecked in 8 weeks.

## 2022-06-03 ENCOUNTER — HOSPITAL ENCOUNTER (OUTPATIENT)
Dept: PHYSICAL THERAPY | Age: 32
Setting detail: THERAPIES SERIES
Discharge: HOME | End: 2022-06-03
Payer: MEDICAID

## 2022-06-03 PROCEDURE — 97110 THERAPEUTIC EXERCISES: CPT

## 2022-06-03 PROCEDURE — 97140 MANUAL THERAPY 1/> REGIONS: CPT

## 2022-06-03 NOTE — FLOWSHEET NOTE
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 OBJECTIVE: fibular head aligned but TTP. Edema posterior to R lat malleolus. No other swelling. Exercise/Equipment Resistance/Repetitions Other comments     Ankle PNF D1& D2 vs man resistance 2x12 each diagonal      TG x5'             HR nv      Standing balance White 1/2 roll x1'      Tandem stance nv      Lat amb with TB around thighs Lime green x5 laps      BAPS board A/P, lat, cw, ccw L2 x20 each                                                                                      Other Therapeutic Activities:       HEP:  4-way ankle with TB, towel scrunches, arch raises, ankle alphabet, clamshells   [id number]    Manual Treatments:       STM to lateral lower RLE and ankle x13'  fibular head PA mobs gr. III      Modalities:      Test/Measurements:         ASSESSMENT:    No significant change since eval.  Juliana today's tx well without pain behaviors.     Treatment/Activity Tolerance:   [x]Patient tolerated treatment well [] Patient limited by jill  []Patient limited by pain [] Patient limited by other medical complications  [] Other:     Goals:    Short Term Goals  Time Frame for Short term goals: 3 wks  Short term goal 1: Pt will decrease pain by 50% in frequency or intensity  Short term goal 2: Pt will be ind and compliant with HEP  Short term goal 3: Pt will increase R PGM strength to 5/5     Long Term Goals  Long term goal 1: Pt will decrease pain by % in frequency or intensity  Long term goal 2: Pt will deny a functional limitation with standing/ walking due to pain  Long term goal 3: Pt will deny a functional limitation with driving due to pain     Plan: [x] Continue per plan of care [] Alter current plan (see comments)   [] Plan of care initiated [] Hold pending MD visit [] Discharge      Plan for Next Session:      Re-Certification Due Date:         Signature:  Delicia Rangel, PT

## 2022-06-05 ENCOUNTER — CLINICAL DOCUMENTATION (OUTPATIENT)
Dept: PRIMARY CARE CLINIC | Age: 32
End: 2022-06-05

## 2022-06-05 DIAGNOSIS — Q79.60 EHLERS-DANLOS SYNDROME: Primary | ICD-10-CM

## 2022-06-05 NOTE — PROGRESS NOTES
Patient brought in paperwork for disability placards due to Johnie-Danlos Syndrome. However, patient reports having multiple seizures a week and her seizure medications are not controlling them. She wanted to add medication in addition to her trileptal. Due to this, I can not sign these papers. It is unsafe for her to be on the road. She could have a seizure while driving and endanger the lives of others. Needs to be cleared by neurology. Patient wants custom wheelchair due to EDS. I would like her to be evaluated by PT/OT to assess the need for this. I did not see significant deficits in our short visit.

## 2022-06-06 ENCOUNTER — TELEPHONE (OUTPATIENT)
Dept: PRIMARY CARE CLINIC | Age: 32
End: 2022-06-06

## 2022-06-06 NOTE — TELEPHONE ENCOUNTER
Informed patient all referrals have been entered and signed patient will pick the printed referrals at the  in the next few days

## 2022-06-06 NOTE — TELEPHONE ENCOUNTER
----- Message from Oniel Patel MD sent at 6/5/2022  1:23 PM EDT -----  Regarding: Please call to inform  Patient brought in paperwork for disability placards due to Johnie-Danlos Syndrome. However, patient reports having multiple seizures a week and her seizure medications are not controlling them. She wanted to add medication in addition to her trileptal. Due to this, I can not sign these papers. It is unsafe for her to be on the road. She could have a seizure while driving and endanger the lives of others. Needs to be cleared by neurology.         Patient wants custom wheelchair due to EDS. I would like her to be evaluated by PT/OT to assess the need for this. I did not see significant deficits in our short visit.

## 2022-06-08 ENCOUNTER — HOSPITAL ENCOUNTER (OUTPATIENT)
Dept: PHYSICAL THERAPY | Age: 32
Setting detail: THERAPIES SERIES
Discharge: HOME | End: 2022-06-08
Payer: MEDICAID

## 2022-06-08 PROCEDURE — 97110 THERAPEUTIC EXERCISES: CPT

## 2022-06-08 PROCEDURE — 97140 MANUAL THERAPY 1/> REGIONS: CPT

## 2022-06-08 NOTE — FLOWSHEET NOTE
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, F479.196 s/p R ankle ligament repair  Insurance/Certification information:  PT Insurance Information: Medicaid  Physician Information:   Sivan Palacios MD  Plan of care signed (Y/N):  sent  Visit# / total visits:      G-Code (if applicable): FOTO: 35 at eval,       Time in:   11:36     Timed Treatment: 44 Total Treatment Time:  44  Time out: 12:20  ________________________________________________________________________________________    Pain Level:    4-10/10  SUBJECTIVE:  Increased pain after several hours of driving yesterday    OBJECTIVE: Edema posterior to R lat malleolus     Exercise/Equipment Resistance/Repetitions Other comments     Ankle PNF D1& D2 vs man resistance x15 each diagonal      TG x5'             HR x8      Standing balance White 1/2 roll x1'      Tandem stance 2x30\" B On blue pads nv     Lat amb with TB around thighs Lime green x5 laps      BAPS board A/P, lat, cw, ccw L2 x20 A/P & lat, x5 circles due to pain                                                                                      Other Therapeutic Activities:       HEP:  4-way ankle with TB, towel scrunches, arch raises, ankle alphabet, clamshells   [id number]    Manual Treatments:       STM to lateral lower RLE and ankle x12'        Modalities:      Test/Measurements:         ASSESSMENT:    No significant change since eval.  Juliana today's tx well without pain behaviors.     Treatment/Activity Tolerance:   [x]Patient tolerated treatment well [] Patient limited by fatique  []Patient limited by pain [] Patient limited by other medical complications  [] Other:     Goals:    Short Term Goals  Time Frame for Short term goals: 3 wks  Short term goal 1: Pt will decrease pain by 50% in frequency or intensity  Short term goal 2: Pt will be ind and compliant with HEP  Short term goal 3: Pt will increase R PGM strength to 5/5     Long Term Goals  Long term goal 1: Pt will decrease pain by % in frequency or intensity  Long term goal 2: Pt will deny a functional limitation with standing/ walking due to pain  Long term goal 3: Pt will deny a functional limitation with driving due to pain     Plan: [x] Continue per plan of care [] Alter current plan (see comments)   [] Plan of care initiated [] Hold pending MD visit [] Discharge      Plan for Next Session:      Re-Certification Due Date:         Signature:  Thomas Pedroza, PT

## 2022-06-10 ENCOUNTER — HOSPITAL ENCOUNTER (OUTPATIENT)
Dept: PHYSICAL THERAPY | Age: 32
Setting detail: THERAPIES SERIES
Discharge: HOME | End: 2022-06-10
Payer: MEDICAID

## 2022-06-10 PROCEDURE — 97140 MANUAL THERAPY 1/> REGIONS: CPT

## 2022-06-10 PROCEDURE — 97110 THERAPEUTIC EXERCISES: CPT

## 2022-06-10 NOTE — FLOWSHEET NOTE
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 Circumference around malleoli:  27.9 cm R, 25.8 cm L    Exercise/Equipment Resistance/Repetitions Other comments     Ankle PNF D1& D2 vs man resistance x15 each diagonal      TG              HR x10      Standing balance White 1/2 roll x1' Rocker board nv     Tandem stance 2x30\" B On blue pads nv     Lat amb with TB around thighs Lime green x5 laps      BAPS board A/P, lat, cw, ccw L2 x20 A/P & lat, x3 circles due to pain                              Nu step seat 9 L3x5'                                                       Other Therapeutic Activities:       HEP:  4-way ankle with TB, towel scrunches, arch raises, ankle alphabet, clamshells   [id number]    Manual Treatments:       STM to lateral lower RLE and ankle x12'        Modalities:      Test/Measurements:         ASSESSMENT:    No significant change since eval.  Pt may benefit from transitioning to aquatic PT due to lack of progress in clinic.   Pt agreeable and we will see if or how she progresses between now and nv.     Treatment/Activity Tolerance:   [x]Patient tolerated treatment well [] Patient limited by fatique  []Patient limited by pain [] Patient limited by other medical complications  [] Other:     Goals:    Short Term Goals  Time Frame for Short term goals: 3 wks  Short term goal 1: Pt will decrease pain by 50% in frequency or intensity  Short term goal 2: Pt will be ind and compliant with HEP  Short term goal 3: Pt will increase R PGM strength to 5/5     Long Term Goals  Long term goal 1: Pt will decrease pain by % in frequency or intensity  Long term goal 2: Pt will deny a functional limitation with standing/ walking due to pain  Long term goal 3: Pt will deny a functional limitation with driving due to pain     Plan: [x] Continue per plan of care [] Alter current plan (see comments)   [] Plan of care initiated [] Hold pending MD visit [] Discharge      Plan for Next Session:      Re-Certification Due Date:         Signature:  Efe Naylor, PT

## 2022-06-13 ENCOUNTER — HOSPITAL ENCOUNTER (OUTPATIENT)
Dept: NON INVASIVE DIAGNOSTICS | Age: 32
Discharge: HOME | End: 2022-06-13
Payer: MEDICAID

## 2022-06-13 ENCOUNTER — OFFICE VISIT (OUTPATIENT)
Dept: ORTHOPEDIC SURGERY | Age: 32
End: 2022-06-13
Payer: MEDICAID

## 2022-06-13 ENCOUNTER — TELEPHONE (OUTPATIENT)
Dept: ORTHOPEDIC SURGERY | Age: 32
End: 2022-06-13

## 2022-06-13 VITALS — BODY MASS INDEX: 39.78 KG/M2 | WEIGHT: 233 LBS | HEIGHT: 64 IN

## 2022-06-13 DIAGNOSIS — Z98.890 STATUS POST REPAIR OF LIGAMENT OF ANKLE: ICD-10-CM

## 2022-06-13 DIAGNOSIS — Q79.60 EHLERS-DANLOS SYNDROME: ICD-10-CM

## 2022-06-13 DIAGNOSIS — M25.571 CHRONIC PAIN OF RIGHT ANKLE: Primary | ICD-10-CM

## 2022-06-13 DIAGNOSIS — Q66.70 PES CAVUS: ICD-10-CM

## 2022-06-13 DIAGNOSIS — R55 SYNCOPE, UNSPECIFIED SYNCOPE TYPE: ICD-10-CM

## 2022-06-13 DIAGNOSIS — G89.29 CHRONIC PAIN OF RIGHT ANKLE: Primary | ICD-10-CM

## 2022-06-13 DIAGNOSIS — M76.71 PERONEAL TENDINITIS OF RIGHT LOWER EXTREMITY: ICD-10-CM

## 2022-06-13 PROCEDURE — L1902 AFO ANKLE GAUNTLET PRE OTS: HCPCS | Performed by: FAMILY MEDICINE

## 2022-06-13 PROCEDURE — L3040 FT ARCH SUPRT PREMOLD LONGIT: HCPCS | Performed by: FAMILY MEDICINE

## 2022-06-13 PROCEDURE — 99214 OFFICE O/P EST MOD 30 MIN: CPT | Performed by: FAMILY MEDICINE

## 2022-06-13 PROCEDURE — 93225 XTRNL ECG REC<48 HRS REC: CPT

## 2022-06-13 PROCEDURE — 93226 XTRNL ECG REC<48 HR SCAN A/R: CPT

## 2022-06-13 RX ORDER — METHYLPREDNISOLONE 4 MG/1
TABLET ORAL
Qty: 21 KIT | Refills: 0 | Status: SHIPPED | OUTPATIENT
Start: 2022-06-13 | End: 2022-06-30

## 2022-06-13 RX ORDER — DICLOFENAC SODIUM 75 MG/1
75 TABLET, DELAYED RELEASE ORAL 2 TIMES DAILY
Qty: 60 TABLET | Refills: 3 | Status: SHIPPED | OUTPATIENT
Start: 2022-06-13

## 2022-06-13 NOTE — TELEPHONE ENCOUNTER
Spoke to patient and informed them that their MRI has been authorized and that they can call and schedule scan at their convenience. Also told them that they can call and schedule a f/u with Dr. Jamaal Darling once they have MRI scheduled, leaving at least 2-3 days for our office to receive their results.

## 2022-06-14 NOTE — PROGRESS NOTES
Chief Complaint    Initial Consultation Ankle Pain (NP R ANKLE)    Second opinion consultation ongoing lateral right ankle pain status post remote lateral malleolus fracture with sounds like ankle stabilization surgery October 2021 in [place] with history of Johnie-Danlos and ongoing right lateral ankle pain with swelling    History of Present Illness:  Amy Camarena is a 33 y.o. female is a very pleasant currently unemployed white female with history of chronic pain syndrome previously in pain management in [place] as well as a history of Johnie-Danlos who is a patient of Dr. Nhung Bhatia who now sees Dr. Bess James who is being seen today for second opinion consultation regarding ongoing right lateral ankle pain with swelling. Her issues with her ankle currently began while living in [place] and May 2021 when she sustained an inversion injury about her ankle resulting in what the patient stated was a nondisplaced fracture to the lateral malleolus as well as some midfoot fractures. Apparently surgery was not performed for these was treated conservatively. As she was having ongoing pain and disruption function and given her hypermobility history, underwent what sounds like ankle stabilization surgery in October 2021 and was in a boot up until relocating to [place] in February 2022. We do not have any of her previous medical records but is having chronic pain to her ankle with swelling which is been ineffective with starting into physical therapy. She has taken anti-inflammatory medications and did have consultation with Dr. Adenike Singh on 4/1/2022 who encouraged her to start into physical therapy and obtain her medical records and follow-up thereafter. Since starting into physical therapy, she has not progressed in any way shape or form and her therapist is recommending that she start into aquatics.   They have tried to get into pain management but of but not unable at this point to find a pain management physician to manage her symptoms and continues to have stiffness and pain. She does have a cavus foot  but does not typically utilize orthotics and has been taking primarily ibuprofen and reasonable doses and despite this her symptoms are progressing. She is being seen today for second opinion orthopedic and sports consultation and we do not have any of her previous surgical records to review. Medical History    Patient's medications, allergies, past medical, surgical, social and family histories were reviewed and updated as appropriate. Review of Systems    Pertinent items are noted in HPI  Review of systems reviewed from Patient History Form dated on 6/13/2022 and available in the patient's chart under the Media tab. Vital Signs  There were no vitals filed for this visit. General Exam:     Constitutional: Patient is adequately groomed with no evidence of malnutrition  DTRs: Deep tendon reflexes are intact  Mental Status: The patient is oriented to time, place and person. The patient's mood and affect are appropriate. Lymphatic: The lymphatic examination bilaterally reveals all areas to be without enlargement or induration. Vascular: Examination reveals no swelling or calf tenderness. Peripheral pulses are palpable and 2+. Neurological: The patient has good coordination. There is no weakness or sensory deficit. Ankle Examination  Inspection: No high-grade deformity and surgical scar does reveal appears to be an operative scar consistent with ankle stabilization. Palpation: Have most of her tenderness over the peroneal tendon although she does have some tenderness over the ATFL and CF ligaments as well. There is no high-grade fibular metaphyseal or tibial tenderness. Mild Achilles tenderness. No defects. Rang of Motion: 50% reduction in ankle motion with tightness to Achilles.          Strength: He does have giveaway type weakness 4- out of 5 with eversion and dorsiflexion. Special Tests: Negative anterior drawer talar tilt and Ruiz's testing. Skin: There are no rashes, ulcerations or lesions. Distal motor sensory and vascular exam is intact. Gait: Antalgia. Pain with positional change. She does have slightly cavus foot  and does overpronate. Reflex symmetrically preserved. Additional Comments:       Additional Examinations:       Contralateral Exam: Examination of the left ankle reveals intact skin. There is no focal tenderness or swelling. The patient demonstrates full painless range of motion with regards to plantarflexion, dorsiflexion, inversion, eversion. Strength is 5/5 thorough out all planes. Ligamentous stability is grossly intact. Right Lower Extremity: Examination of the right lower extremity does not show any tenderness, deformity or injury. Range of motion is unremarkable. There is no gross instability. There are no rashes, ulcerations or lesions. Strength and tone are normal.  Left Lower Extremity: Examination of the left lower extremity does not show any tenderness, deformity or injury. Range of motion is unremarkable. There is no gross instability. There are no rashes, ulcerations or lesions. Strength and tone are normal.        Diagnostic Test Findings:     Right ankle AP lateral mortise films were reviewed from 4/1/2022 which does not show any evidence of displaced fracture. Surgical site changes likely consistent with a Broström type repair per patient history. Right foot AP lateral and oblique films also reviewed from 4/1/2022 does not show any evidence of acute fracture. Assessment : #1.  8-month status post persistent symptomatic right lateral ankle pain status post presumed right lateral ankle stabilization/Broström repair ongoing lateral ankle pain with swelling. Overpronation.     2.  History of Danlos and chronic pain syndrome previously in pain management in [place] Impression:    Encounter Diagnoses   Name Primary?  Chronic pain of right ankle Yes    Johnie-Danlos syndrome     Peroneal tendinitis of right lower extremity     Pes cavus     Status post repair of ligament of ankle        Office Procedures:    Orders Placed This Encounter   Procedures    MRI ANKLE RIGHT WO CONTRAST     Standing Status:   Future     Standing Expiration Date:   7/13/2022     Scheduling Instructions:      [facility] [id number] #:      TIME AND DATE TBD      PLEASE CALL PATIENT ONCE APPROVED TO SCHEDULE       PUSH TO [employer] SYSTEM            Remember that it may take several business days to pre-cert your MRI through your insurance. Our office will contact you as soon as we have the approval. We will not give any test results over the phone. Please call [phone] once you have your test day and time to schedule a follow up with Dr. Dion Jenkins. Order Specific Question:   Reason for exam:     Answer:   R/O PERONEAL SPLIT TEAR     Order Specific Question:   What is the sedation requirement? Answer:   None    Powerstep Protech Full Length Insert     Patient was prescribed Powerstep Protech Full Length Inserts. The right ANKLE will require stabilization / support from this semi-rigid / rigid orthosis to improve their function. The orthosis will assist in protecting the affected area, provide functional support and facilitate healing. The patient was educated and fit by a healthcare professional with expert knowledge and specialization in brace application while under the direct supervision of the treating physician. Verbal and written instructions for the use of and application of this item were provided. They were instructed to contact the office immediately should the brace result in increased pain, decreased sensation, increased swelling or worsening of the condition.     Angélica Ankle Brace     Patient was prescribed a Angélica Ankle Brace. The right ankle will require stabilization / immobilization from this semi-rigid / rigid orthosis to improve their function. The orthosis will assist in protecting the affected area, provide functional support and facilitate healing. Patient was instructed to progress ambulation weight bearing as tolerated in the device. The patient was educated and fit by a healthcare professional with expert knowledge and specialization in brace application while under the direct supervision of the treating physician. Verbal and written instructions for the use of and application of this item were provided. They were instructed to contact the office immediately should the brace result in increased pain, decreased sensation, increased swelling or worsening of the condition. Treatment Plan:  Treatment options were discussed with Emilie Emilyray. We did review her previous plain films and exam findings as well as history. It sounds as if she is now about a year out from a lateral malleolus fracture and tarsal fracture was treated conservatively in [place]. She also does have a history of chronic pain syndrome previously in pain management in [place] as well as hypermobility with Johnie-Danlos. She has been enrolled in physical therapy but is having ongoing pain symptoms to her lateral ankle with focal swelling over the peroneals. She has had about 6 weeks of physical therapy but is not progressing per her therapist notes and we did recommend that she try initially aquatics therapy at the recommendation of her therapist and I would recommend that she gets an MRI of her ankle to evaluate for peroneal split tear. She was given a lace up ankle brace and given power step inserts initially.   We placed her on a Medrol Dosepak to be followed by diclofenac 75 mg 1 pill twice daily and give her referral for formal pain management consultation with Dr. Moe Comp as she was previously in pain management in [place]. We also encouraged her to try to get her pertinent medical records from [place] for her surgical repair and follow-up with Dr. Una Clark post imaging as she does seem to be failing servant of treatment. Icing and activity modification was discussed. We will see her back as needed but she will contact us with questions or concerns. This dictation was performed with a verbal recognition program (DRAGON) and it was checked for errors. It is possible that there are still dictated errors within this office note. If so, please bring any errors to my attention for an addendum. All efforts were made to ensure that this office note is accurate.

## 2022-06-19 LAB
ACQUISITION DURATION: NORMAL S
AVERAGE HEART RATE: 100 BPM
EKG DIAGNOSIS: NORMAL
HOLTER MAX HEART RATE: 144 BPM
HOOKUP DATE: NORMAL
HOOKUP TIME: NORMAL
MAX HEART RATE TIME/DATE: NORMAL
MIN HEART RATE TIME/DATE: NORMAL
MIN HEART RATE: 75 BPM
NUMBER OF QRS COMPLEXES: NORMAL
NUMBER OF SUPRAVENTRICULAR COUPLETS: 0
NUMBER OF SUPRAVENTRICULAR ECTOPICS: 0
NUMBER OF SUPRAVENTRICULAR ISOLATED BEATS: 0
NUMBER OF VENTRICULAR BIGEMINAL CYCLES: 0
NUMBER OF VENTRICULAR COUPLETS: 0
NUMBER OF VENTRICULAR ECTOPICS: 0

## 2022-06-21 ENCOUNTER — HOSPITAL ENCOUNTER (OUTPATIENT)
Dept: PHYSICAL THERAPY | Age: 32
Setting detail: THERAPIES SERIES
Discharge: HOME | End: 2022-06-21
Payer: MEDICAID

## 2022-06-21 DIAGNOSIS — G89.4 CHRONIC PAIN DISORDER: ICD-10-CM

## 2022-06-21 PROCEDURE — 97530 THERAPEUTIC ACTIVITIES: CPT

## 2022-06-21 PROCEDURE — 97140 MANUAL THERAPY 1/> REGIONS: CPT

## 2022-06-21 PROCEDURE — 97110 THERAPEUTIC EXERCISES: CPT

## 2022-06-21 RX ORDER — GABAPENTIN 800 MG/1
800 TABLET ORAL 3 TIMES DAILY
Qty: 90 TABLET | Refills: 2 | OUTPATIENT
Start: 2022-06-21 | End: 2022-07-21

## 2022-06-21 NOTE — TELEPHONE ENCOUNTER
Refill Request - Controlled Substance         Last Seen Department: 5/24/2022  Last Seen by PCP: Visit date not found    Last Written: 3/10, 2 refills by Dr. Felicity Aguilar    Last UDS: 5/22, + for [facility] and Amphetamine    Med Agreement Signed On: n/a    Next Appointment: 6/30/2022       Requested Prescriptions     Pending Prescriptions Disp Refills    gabapentin (NEURONTIN) 800 MG tablet 90 tablet 2     Sig: Take 1 tablet by mouth 3 times daily for 30 days.

## 2022-06-21 NOTE — FLOWSHEET NOTE
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 s/p R ankle ligament repair  Insurance/Certification information:  PT Insurance Information: Medicaid  Physician Information:   Misael Watters MD  Plan of care signed (Y/N):  yes  Visit# / total visits:      G-Code (if applicable): FOTO: 35 at eval,       Time in:   9:18     Timed Treatment: 43 Total Treatment Time:  48  Time out: 10:06  ________________________________________________________________________________________    Pain Level:    4-10/10  SUBJECTIVE:  Had MRI which revealed suggestion of small, low-grade partial tear of the peroneus brevis, low-grade inframalleolar peroneus longus tendinosis, and suggestion of a low-grade tear of the ATFL. Also saw Dr. Alisa Loza, who recommends aquatic PT. Had a cortisone injection yesterday. Cont to deny improvement and is agreeable to transition to aquatic PT.  OBJECTIVE: Edema posterior to R lat malleolus. Circumference around malleoli:  28.8 cm R. Arrived wearing Katz brace.      Exercise/Equipment Resistance/Repetitions Other comments     Ankle PNF D1& D2 vs man resistance x15 each diagonal      TG              HR x10      Standing balance White 1/2 roll x1' Rocker board nv     Tandem stance 2x30\" B On blue pads nv     Lat amb with TB around thighs Lime green x5 laps      BAPS board A/P, lat, cw, ccw L2 x20 A/P & lat, x8 circles due to pain                              Nu step seat 9 L3x5'                                                       Other Therapeutic Activities:   Discussion of POC/ transition to aquatics    HEP:  4-way ankle with TB, towel scrunches, arch raises, ankle alphabet, clamshells   [id number]    Manual Treatments:       STM to lateral lower RLE and ankle x12'        Modalities:      Test/Measurements:         ASSESSMENT:    Will transition to aquatic PT    Treatment/Activity Tolerance:   [x]Patient tolerated treatment well [] Patient limited by fatique  []Patient limited by pain [] Patient limited by other medical complications  [] Other:     Goals:    Short Term Goals  Time Frame for Short term goals: 3 wks  Short term goal 1: Pt will decrease pain by 50% in frequency or intensity  Short term goal 2: Pt will be ind and compliant with HEP  Short term goal 3: Pt will increase R PGM strength to 5/5     Long Term Goals  Long term goal 1: Pt will decrease pain by % in frequency or intensity  Long term goal 2: Pt will deny a functional limitation with standing/ walking due to pain  Long term goal 3: Pt will deny a functional limitation with driving due to pain     Plan: [x] Continue per plan of care [] Alter current plan (see comments)   [] Plan of care initiated [] Hold pending MD visit [] Discharge      Plan for Next Session:      Re-Certification Due Date:         Signature:  Shagufta Thompson PT

## 2022-06-22 ENCOUNTER — PATIENT MESSAGE (OUTPATIENT)
Dept: ORTHOPEDIC SURGERY | Age: 32
End: 2022-06-22

## 2022-06-22 DIAGNOSIS — Z98.890 S/P ANKLE LIGAMENT REPAIR: ICD-10-CM

## 2022-06-22 DIAGNOSIS — Z98.890 STATUS POST REPAIR OF LIGAMENT OF ANKLE: ICD-10-CM

## 2022-06-22 DIAGNOSIS — Q79.60 EHLERS-DANLOS SYNDROME: Primary | ICD-10-CM

## 2022-06-22 DIAGNOSIS — Q66.70 PES CAVUS: ICD-10-CM

## 2022-06-22 DIAGNOSIS — G89.29 CHRONIC PAIN OF RIGHT ANKLE: ICD-10-CM

## 2022-06-22 DIAGNOSIS — M25.571 CHRONIC PAIN OF RIGHT ANKLE: ICD-10-CM

## 2022-06-22 DIAGNOSIS — M76.71 PERONEAL TENDINITIS OF RIGHT LOWER EXTREMITY: ICD-10-CM

## 2022-06-22 RX ORDER — GABAPENTIN 800 MG/1
TABLET ORAL
Qty: 90 TABLET | Refills: 2 | OUTPATIENT
Start: 2022-06-22

## 2022-06-23 ENCOUNTER — HOSPITAL ENCOUNTER (OUTPATIENT)
Dept: PHYSICAL THERAPY | Age: 32
Setting detail: THERAPIES SERIES
Discharge: HOME | End: 2022-06-23
Payer: MEDICAID

## 2022-06-23 ENCOUNTER — APPOINTMENT (OUTPATIENT)
Dept: PHYSICAL THERAPY | Age: 32
End: 2022-06-23
Payer: MEDICAID

## 2022-06-23 ENCOUNTER — TELEPHONE (OUTPATIENT)
Dept: ORTHOPEDIC SURGERY | Age: 32
End: 2022-06-23

## 2022-06-23 PROCEDURE — 97113 AQUATIC THERAPY/EXERCISES: CPT

## 2022-06-23 PROCEDURE — 97150 GROUP THERAPEUTIC PROCEDURES: CPT

## 2022-06-23 RX ORDER — GABAPENTIN 800 MG/1
800 TABLET ORAL 3 TIMES DAILY
Qty: 48 TABLET | Refills: 0 | Status: SHIPPED | OUTPATIENT
Start: 2022-06-23 | End: 2022-07-07

## 2022-06-23 NOTE — FLOWSHEET NOTE
Jose  [facility] 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, Z677.751 s/p R ankle ligament repair  Treatment Diagnosis:  R ankle pain, hypermobility  Insurance/Certification information:  Medicaid  Plan of care signed (Y/N):  yes  Visit# / total visits:  1/12 (6 land-based visits completed in this episode)  Pain level: 4/10 \"right ankle\"   Electronically signed by:  Walter Shone, PT, DPT      Soler  B= Belt DB= Dumbells T= Theratube   H= Hydrotone N= Noodles W= Weights   P= Paddles S= Speedo equipment K= Kickboard     Exercises/Activities   Warm-up/Amb    Exercises      Slow forward  x2laps  HR/TR  x10    Slow sideways  x2laps  Marches  x2min    Slow backwards  x2laps  Mini-squats  x10    Medium forward    3-way SLR  x10B    Medium sideways    Hip circles/fig 8  x10B    Small shuffle    Hamstring curls  x10B    Jog    Knee extension  x10B    Braiding    Pelvic tilts  [id number]     Bicycling  x2min  Scap squeezes          Shoulder flex/ext  x10B w/TA and tandem w/ (R) foot forward    Functional    Shoulder abd/add  x10B w/TA and tandem w/ (R) foot forward    Step    Shoulder H. abd/add  x10B w/TA and tandem w/ (R) foot forward    Lifting    Shoulder IR/ER      Hand to opp knee    Rowing      Push down squat    Bilateral pull down      UE PNF    Push/pull      LE PNF    Push downs      Wall push ups    Arm circles      SLS    Elbow flex/ext          Chin tuck      Stretching    UT shrugs/rolls      Gastroc/Soleus  [id number] B  Rocking horse      Hamstring  5t50ibh B        SKTC    Other      Piriformis    Tandem [id number] B    Hip flexor          Ladder pull          Pec stretch          Post deltoid           Time In: 2:05pm     Timed Code Treatment

## 2022-06-23 NOTE — TELEPHONE ENCOUNTER
SPOKE TO PATIENT. DR. Ambrose Callow HER A SUPPLY UNTIL SHE SEES DR. WILLINGHAM ON 7/8. WITH HIM BEING OUT OF OFFICE AND THIS BEING A CONTROLLED SUBSTANCE, HIGH DOSE, ANOTHER PROVIDER IN THE OFFICE IS GOING TO SEND RX TO PHARMACY FOR HER TO BE ABLE TO . I TOLD HER IT WILL BE SENT TO THE [facility]. PATIENT CONVEYED UNDERSTANDING THAT THERE WILL NOT BE A REFILL AND IT WILL BE UP TO PAIN MGMT OR DR. WILLINGHAM FOR ANY CONTINUED MEDICATION.

## 2022-06-23 NOTE — TELEPHONE ENCOUNTER
The patient call back and stated that her PCP wont give he a refill she is just needing to know if she could get a refill on her Gabapentin she stated she has and appointment with Jermain  on 7/8/22 is there anyway she can get a refill her pharmacy is on file. Please Advise.

## 2022-06-28 ENCOUNTER — HOSPITAL ENCOUNTER (OUTPATIENT)
Dept: PHYSICAL THERAPY | Age: 32
Setting detail: THERAPIES SERIES
Discharge: HOME | End: 2022-06-28
Payer: MEDICAID

## 2022-06-28 NOTE — FLOWSHEET NOTE
Jose  [facility]. and Therapy, [facility], [address] [place] [address]  Phone: [phone]  Fax [phone]        Physical Therapy  Cancellation/No-show Note  Patient Name:  Saman Solis  :  1990   Date:  2022  Cancels to date: 1  No-shows to date: 0    For today's appointment patient:  [x] Cancelled  [] Rescheduled appointment  [] No-show     Reason given by patient:  [] Patient ill  [] Conflicting appointment  [] No transportation    [] Conflict with work  [] No reason given  [x] Other:     Comments:  Patient's wife called to cancel patient's appointment due to the patient \"just having a major seizure\".      Electronically signed by:  Ekaterina Barros PT

## 2022-06-29 ENCOUNTER — APPOINTMENT (OUTPATIENT)
Dept: PHYSICAL THERAPY | Age: 32
End: 2022-06-29
Payer: MEDICAID

## 2022-06-30 ENCOUNTER — HOSPITAL ENCOUNTER (OUTPATIENT)
Dept: PHYSICAL THERAPY | Age: 32
Setting detail: THERAPIES SERIES
Discharge: HOME | End: 2022-06-30
Payer: MEDICAID

## 2022-06-30 ENCOUNTER — OFFICE VISIT (OUTPATIENT)
Dept: PRIMARY CARE CLINIC | Age: 32
End: 2022-06-30
Payer: MEDICAID

## 2022-06-30 VITALS
SYSTOLIC BLOOD PRESSURE: 108 MMHG | HEIGHT: 64 IN | HEART RATE: 97 BPM | RESPIRATION RATE: 18 BRPM | WEIGHT: 210 LBS | BODY MASS INDEX: 35.85 KG/M2 | DIASTOLIC BLOOD PRESSURE: 72 MMHG | TEMPERATURE: 98 F | OXYGEN SATURATION: 97 %

## 2022-06-30 DIAGNOSIS — M79.671 RIGHT FOOT PAIN: ICD-10-CM

## 2022-06-30 DIAGNOSIS — R51.9 LEFT-SIDED FACE PAIN: Primary | ICD-10-CM

## 2022-06-30 DIAGNOSIS — N92.0 MENORRHAGIA WITH REGULAR CYCLE: ICD-10-CM

## 2022-06-30 PROCEDURE — 99214 OFFICE O/P EST MOD 30 MIN: CPT

## 2022-06-30 RX ORDER — LEVONORGESTREL / ETHINYL ESTRADIOL AND ETHINYL ESTRADIOL 150-30(84)
1 KIT ORAL DAILY
Qty: 1 PACKET | Refills: 3 | Status: SHIPPED | OUTPATIENT
Start: 2022-06-30

## 2022-06-30 SDOH — ECONOMIC STABILITY: TRANSPORTATION INSECURITY
IN THE PAST 12 MONTHS, HAS THE LACK OF TRANSPORTATION KEPT YOU FROM MEDICAL APPOINTMENTS OR FROM GETTING MEDICATIONS?: NO

## 2022-06-30 SDOH — ECONOMIC STABILITY: FOOD INSECURITY: WITHIN THE PAST 12 MONTHS, THE FOOD YOU BOUGHT JUST DIDN'T LAST AND YOU DIDN'T HAVE MONEY TO GET MORE.: NEVER TRUE

## 2022-06-30 SDOH — ECONOMIC STABILITY: TRANSPORTATION INSECURITY
IN THE PAST 12 MONTHS, HAS LACK OF TRANSPORTATION KEPT YOU FROM MEETINGS, WORK, OR FROM GETTING THINGS NEEDED FOR DAILY LIVING?: NO

## 2022-06-30 SDOH — ECONOMIC STABILITY: FOOD INSECURITY: WITHIN THE PAST 12 MONTHS, YOU WORRIED THAT YOUR FOOD WOULD RUN OUT BEFORE YOU GOT MONEY TO BUY MORE.: NEVER TRUE

## 2022-06-30 ASSESSMENT — PATIENT HEALTH QUESTIONNAIRE - PHQ9
6. FEELING BAD ABOUT YOURSELF - OR THAT YOU ARE A FAILURE OR HAVE LET YOURSELF OR YOUR FAMILY DOWN: 3
7. TROUBLE CONCENTRATING ON THINGS, SUCH AS READING THE NEWSPAPER OR WATCHING TELEVISION: 2
8. MOVING OR SPEAKING SO SLOWLY THAT OTHER PEOPLE COULD HAVE NOTICED. OR THE OPPOSITE, BEING SO FIGETY OR RESTLESS THAT YOU HAVE BEEN MOVING AROUND A LOT MORE THAN USUAL: 2
SUM OF ALL RESPONSES TO PHQ QUESTIONS 1-9: 19
9. THOUGHTS THAT YOU WOULD BE BETTER OFF DEAD, OR OF HURTING YOURSELF: 0
SUM OF ALL RESPONSES TO PHQ QUESTIONS 1-9: 19
5. POOR APPETITE OR OVEREATING: 3
1. LITTLE INTEREST OR PLEASURE IN DOING THINGS: 1
3. TROUBLE FALLING OR STAYING ASLEEP: 3
10. IF YOU CHECKED OFF ANY PROBLEMS, HOW DIFFICULT HAVE THESE PROBLEMS MADE IT FOR YOU TO DO YOUR WORK, TAKE CARE OF THINGS AT HOME, OR GET ALONG WITH OTHER PEOPLE: 3
SUM OF ALL RESPONSES TO PHQ QUESTIONS 1-9: 19
2. FEELING DOWN, DEPRESSED OR HOPELESS: 2
SUM OF ALL RESPONSES TO PHQ QUESTIONS 1-9: 19
4. FEELING TIRED OR HAVING LITTLE ENERGY: 3
SUM OF ALL RESPONSES TO PHQ9 QUESTIONS 1 & 2: 3

## 2022-06-30 ASSESSMENT — ENCOUNTER SYMPTOMS
ABDOMINAL PAIN: 0
BACK PAIN: 1
NAUSEA: 0
COUGH: 0
SHORTNESS OF BREATH: 0
RHINORRHEA: 0

## 2022-06-30 ASSESSMENT — ANXIETY QUESTIONNAIRES
IF YOU CHECKED OFF ANY PROBLEMS ON THIS QUESTIONNAIRE, HOW DIFFICULT HAVE THESE PROBLEMS MADE IT FOR YOU TO DO YOUR WORK, TAKE CARE OF THINGS AT HOME, OR GET ALONG WITH OTHER PEOPLE: EXTREMELY DIFFICULT
6. BECOMING EASILY ANNOYED OR IRRITABLE: 3
GAD7 TOTAL SCORE: 21
7. FEELING AFRAID AS IF SOMETHING AWFUL MIGHT HAPPEN: 3
5. BEING SO RESTLESS THAT IT IS HARD TO SIT STILL: 3
1. FEELING NERVOUS, ANXIOUS, OR ON EDGE: 3
2. NOT BEING ABLE TO STOP OR CONTROL WORRYING: 3
3. WORRYING TOO MUCH ABOUT DIFFERENT THINGS: 3
4. TROUBLE RELAXING: 3

## 2022-06-30 ASSESSMENT — LIFESTYLE VARIABLES: HOW OFTEN DO YOU HAVE A DRINK CONTAINING ALCOHOL: NEVER

## 2022-06-30 ASSESSMENT — SOCIAL DETERMINANTS OF HEALTH (SDOH): HOW HARD IS IT FOR YOU TO PAY FOR THE VERY BASICS LIKE FOOD, HOUSING, MEDICAL CARE, AND HEATING?: NOT HARD AT ALL

## 2022-06-30 NOTE — PROGRESS NOTES
[address] and [facility]                                             [address] [place] [address], [facility], [address] [place], [place] [id number]        Phone: [phone]      Name:  Mini Buenrostro  :    1990    Consultants:   Patient Care Team:  Jenise Snyder DO as PCP - General (Family Medicine)    Chief Complaint:     Mini Buenrostro is a 33 y.o. female  who presents today for an established patient care visit with Personalized Prevention Plan Services as noted below. HPI:     Patient is a 47-year-old female with a past medical history of asthma, seizure disorder, chronic pain disorder, ADHD, anxiety and depression, polypharmacy, Johnie Danlos syndrome, factitious disorder, obesity who presents today for chronic care follow-up. Right foot pain  Patient states that the pain began about a week ago. She states that her foot was stuck under a steel chair and she sat down in the chair. She is able to walk on her foot however it is extremely painful. She has tried topical Voltaren gel and over-the-counter analgesics with no improvement in symptoms. It is associated with swelling and bruising. She has not used any ice because this is painful. Left-sided facial pain  Patient had a seizure on Tuesday. She states during her seizure episode she fell and hit her face against the wooden arm of a couch. She states that the left side of her face is extremely painful. She states that it hurts to blink. She states it hurts when she moves her eye. She states there is swelling. Heavy periods  Patient states that she has always had heavy periods. She is concerned that this may be causing her anemia. She is takes iron supplements without improvement in anemia. She has been on Vonda in the past and would not like to try this one.        Patient Active Problem List   Diagnosis    Other chronic sinusitis    Chronic pain of right ankle    Mild intermittent asthma without complication    Seizure (HCC)    Chronic pain disorder    Attention deficit hyperactivity disorder (ADHD)    Anxiety and depression    Polypharmacy    Johnie-Danlos syndrome    Factitious disorder    Class 2 severe obesity due to excess calories with serious comorbidity and body mass index (BMI) of 39.0 to 39.9 in adult (HCC)    Iron deficiency    Peroneal tendinitis of right lower extremity    Pes cavus    Status post repair of ligament of ankle         Past Medical History:    Past Medical History:   Diagnosis Date    ADHD (attention deficit hyperactivity disorder)     Anxiety     Asthma     Autism     Chronic back pain     Depression     Johnie-Danlos syndrome     Seizures ([facility] [address]        Past Surgical History:  Past Surgical History:   Procedure Laterality Date    ANKLE FRACTURE SURGERY Right 05/2021    CHOLECYSTECTOMY      EYE SURGERY      tear ducts    WRIST GANGLION EXCISION         Home Meds:  Prior to Visit Medications    Medication Sig Taking? Authorizing Provider   Levonorgest-Eth Estrad 91-Day 0.15-0.03 &0.01 MG TABS Take 1 tablet by mouth daily Yes Ainsley Meza,    gabapentin (NEURONTIN) 800 MG tablet Take 1 tablet by mouth 3 times daily for 15 days.  Yes Gisela Metzger PA-C   diclofenac (VOLTAREN) 75 MG EC tablet Take 1 tablet by mouth 2 times daily Yes Sigrid Reyes MD   ferrous sulfate (IRON 325) 325 (65 Fe) MG tablet Take 1 tablet by mouth daily (with breakfast) Yes Jessica Tatum MD   OXcarbazepine (TRILEPTAL) 600 MG tablet Take 1 tablet by mouth 2 times daily Yes Jessica Tatum MD   prazosin (MINIPRESS) 1 MG capsule Take 1 mg by mouth nightly Take 3 capsules by mouth at bedtime Yes Historical Provider, MD   loratadine-pseudoephedrine (CLARITIN-D 24HR)  MG per extended release tablet Take 1 tablet by mouth daily Yes Historical Provider, MD   risperiDONE (RISPERDAL) 1 MG tablet Take 1 mg by mouth 2 times daily Yes Historical Provider, MD   LORazepam (ATIVAN PO) Take 2.5 mg by mouth  Yes Historical Provider, MD   medical marijuana Take by mouth as needed. Yes Historical Provider, MD   budesonide-formoterol (SYMBICORT) 80-4.5 MCG/ACT AERO Inhale 2 puffs into the lungs 2 times daily Yes Naheed Nuñez DO   amphetamine-dextroamphetamine (ADDERALL XR) 30 MG extended release capsule Take 30 mg by mouth every morning. Yes Historical Provider, MD   amphetamine-dextroamphetamine (ADDERALL, 15MG,) 15 MG tablet Take 15 mg by mouth 2 times daily. Yes Historical Provider, MD   PRAZOSIN HCL PO Take 4 mg by mouth  Yes Historical Provider, MD   albuterol sulfate HFA (VENTOLIN HFA) 108 (90 Base) MCG/ACT inhaler Inhale 2 puffs into the lungs 4 times daily as needed for Wheezing Yes Parag Urban DO   gabapentin (NEURONTIN) 800 MG tablet Take 1 tablet by mouth 3 times daily for 30 days. Paula Nuñez DO   EPINEPHrine (EPIPEN 2-KOREY) 0.3 MG/0.3ML SOAJ injection Inject 0.3 mLs into the skin once for 1 dose Use as directed for allergic reaction  Parag rUban DO       Allergies:    Macadamia nut oil; Other; Penicillins; Albumen, egg; Erythromycin; Keppra [levetiracetam]; Levaquin [levofloxacin]; Lexapro [escitalopram]; Peanut (diagnostic);  Wellbutrin [bupropion]; and Azithromycin    Family History:       Problem Relation Age of Onset    Diabetes Father     High Blood Pressure Father     Heart Disease Father     High Blood Pressure Paternal Grandmother     Diabetes Paternal Grandmother     Stroke Paternal Grandmother     Depression Paternal Grandmother     Dementia Paternal Grandmother     Heart Disease Paternal Grandmother          Health Maintenance Completed:  Health Maintenance   Topic Date Due    Cervical cancer screen  07/28/2022 (Originally 8/26/2009)    Pneumococcal 0-64 years Vaccine (1 - PCV) 07/29/2022 (Originally 8/26/1994)    COVID-19 Vaccine (1) 07/29/2022 (Originally 8/26/1993)    DTaP/Tdap/Td vaccine (1 - Tdap) 06/30/2023 (Originally 8/26/2007)    Flu vaccine (Season Ended) 09/01/2022    Depression Monitoring  06/30/2023    Hepatitis A vaccine  Aged Out    Hepatitis B vaccine  Aged Out    Hib vaccine  Aged Out    Meningococcal (ACWY) vaccine  Aged Out    Varicella vaccine  Discontinued    Hepatitis C screen  Discontinued    HIV screen  Discontinued          There is no immunization history for the selected administration types on file for this patient. Review of Systems:  Review of Systems   Constitutional: Negative for chills and fever. HENT: Negative for congestion and rhinorrhea. Eyes: Negative for visual disturbance. Respiratory: Negative for cough and shortness of breath. Cardiovascular: Negative for chest pain. Gastrointestinal: Negative for abdominal pain and nausea. Genitourinary: Positive for menstrual problem. Musculoskeletal: Positive for back pain, gait problem and joint swelling. Neurological: Negative for dizziness and headaches. Psychiatric/Behavioral: Negative for self-injury and suicidal ideas. Physical Exam:   Vitals:    06/30/22 1336   BP: 108/72   Site: Left Upper Arm   Position: Sitting   Cuff Size: Large Adult   Pulse: 97   Resp: 18   Temp: 98 °F (36.7 °C)   TempSrc: Infrared   SpO2: 97%   Weight: 210 lb (95.3 kg)   Height: 5' 4\" (1.626 m)     Body mass index is 36.05 kg/m². Wt Readings from Last 3 Encounters:   06/30/22 210 lb (95.3 kg)   06/13/22 233 lb (105.7 kg)   05/24/22 233 lb 9.6 oz (106 kg)       BP Readings from Last 3 Encounters:   06/30/22 108/72   05/24/22 128/76   05/22/22 135/81       Physical Exam  Constitutional:       Appearance: Normal appearance. She is obese. HENT:      Head: Normocephalic. Contusion present.       Right Ear: Tympanic membrane, ear canal and external ear normal.      Left Ear: Tympanic membrane, ear canal and external ear normal.      Nose: Nose normal.   Eyes:      Extraocular Movements: Extraocular movements intact. Conjunctiva/sclera: Conjunctivae normal.      Pupils: Pupils are equal, round, and reactive to light. Cardiovascular:      Rate and Rhythm: Normal rate and regular rhythm. Pulses: Normal pulses. Heart sounds: Normal heart sounds. No murmur heard. No friction rub. No gallop. Pulmonary:      Effort: Pulmonary effort is normal.      Breath sounds: Normal breath sounds. No wheezing or rhonchi. Abdominal:      General: Abdomen is flat. Bowel sounds are normal. There is no distension. Palpations: Abdomen is soft. Tenderness: There is no abdominal tenderness. Musculoskeletal:      Cervical back: Normal range of motion and neck supple. No rigidity or tenderness. Right foot: Decreased range of motion. Swelling, tenderness and bony tenderness present. Left foot: Normal.   Lymphadenopathy:      Cervical: No cervical adenopathy. Skin:     General: Skin is warm and dry. Neurological:      General: No focal deficit present. Mental Status: She is alert and oriented to person, place, and time. Psychiatric:         Mood and Affect: Mood normal.         Behavior: Behavior normal.         Thought Content:  Thought content normal.         Judgment: Judgment normal.              Lab Review:   Hospital Outpatient Visit on 06/13/2022   Component Date Value    Hookup Date 06/13/2022 Jun 13 2022      Hookup Time 06/13/2022 10:57:03     Acquisition Duration 06/13/2022 41070     Number Of QRS Complexes 06/13/2022 697105     Number Of Ventricular Ec* 06/13/2022 0     Number Of Ventricular Bi* 06/13/2022 0     Number Of Ventricular Co* 06/13/2022 0     Number Of Superventricul* 06/13/2022 0     Number Of Suptaventricul* 06/13/2022 0     Number Of Supraventricul* 06/13/2022 0     Average Heart Rate 06/13/2022 100     Holter Max Heart Rate 06/13/2022 144     Min Heart Rate 06/13/2022 75     Max Heart Rate Time/Date 06/13/2022 [id number]     Min Heart Rate Time/Date 06/13/2022 [id number]     Diagnosis 06/13/2022 This 24-hour test was scanned by Anish Bower RN on 06/17/2022 at 14:25. Tech comments:1. The rhythm was sinus. Average RI interval 0.20, average QRS duration 0.08. Tachycardia present for 44% of test.2. Diary entries of \"lightheaded, heart racing [exercise]\" with sinus tachycardia noted. Confirmed by Vicenta Zamna ([id number]) on 6/19/2022 2:23:12 PM    Hospital Outpatient Visit on 05/24/2022   Component Date Value    Vitamin B-12 05/24/2022 624     Folate 05/24/2022 16.87     Vit D, 25-Hydroxy 05/24/2022 27.6*    WBC 05/24/2022 10.2     RBC 05/24/2022 4.02     Hemoglobin 05/24/2022 11.0*    Hematocrit 05/24/2022 32.9*    MCV 05/24/2022 81.9     MCH 05/24/2022 27.5     MCHC 05/24/2022 33.5     RDW 05/24/2022 14.3     Platelets 27/70/0542 286     MPV 05/24/2022 8.4     Neutrophils % 05/24/2022 74.9     Lymphocytes % 05/24/2022 17.3     Monocytes % 05/24/2022 6.2     Eosinophils % 05/24/2022 1.1     Basophils % 05/24/2022 0.5     Neutrophils Absolute 05/24/2022 7.6     Lymphocytes Absolute 05/24/2022 1.8     Monocytes Absolute 05/24/2022 0.6     Eosinophils Absolute 05/24/2022 0.1     Basophils Absolute 05/24/2022 0.1     Sodium 05/24/2022 141     Potassium 05/24/2022 4.2     Chloride 05/24/2022 103     CO2 05/24/2022 24     Anion Gap 05/24/2022 14     Glucose 05/24/2022 91     BUN 05/24/2022 16     CREATININE 05/24/2022 0.6     GFR Non- 05/24/2022 >60     GFR  05/24/2022 >60     Calcium 05/24/2022 9.2     Iron 05/24/2022 25*    TIBC 05/24/2022 254*    Iron Saturation 05/24/2022 10*    Ferritin 05/24/2022 61.6    Admission on 05/22/2022, Discharged on 05/22/2022   Component Date Value    Oxcarbazepine 05/22/2022 17     Rapid Strep A Screen 05/22/2022 Negative     Urine Culture, Routine 05/22/2022 <50,000 CFU/ml mixed skin/urogenital stacey.  No further workup     SARS-CoV-2, NAAT 05/22/2022 Not Detected     HCG(Urine) Pregnancy Test 05/22/2022 Negative     Amphetamine Screen, Urine 05/22/2022 POSITIVE*    Barbiturate Screen, Ur 05/22/2022 Neg     Benzodiazepine Screen, U* 05/22/2022 Neg     Cannabinoid Scrn, Ur 05/22/2022 POSITIVE*    Cocaine Metabolite Scree* 05/22/2022 Neg     Opiate Scrn, Ur 05/22/2022 Neg     PCP Screen, Urine 05/22/2022 Neg     Methadone Screen, Urine 05/22/2022 Neg     Propoxyphene Scrn, Ur 05/22/2022 Neg     Oxycodone Urine 05/22/2022 Neg     Buprenorphine Urine 05/22/2022 Neg     pH, UA 05/22/2022 6.5     Drug Screen Comment: 05/22/2022 see below     Color, UA 05/22/2022 Yellow     Clarity, UA 05/22/2022 Clear     Glucose, Ur 05/22/2022 Negative     Bilirubin Urine 05/22/2022 Negative     Ketones, Urine 05/22/2022 Negative     Specific Gravity, UA 05/22/2022 1.020     Blood, Urine 05/22/2022 Negative     pH, UA 05/22/2022 6.5     Protein, UA 05/22/2022 Negative     Urobilinogen, Urine 05/22/2022 0.2     Nitrite, Urine 05/22/2022 Negative     Leukocyte Esterase, Urine 05/22/2022 Negative     Microscopic Examination 05/22/2022 Not Indicated     Urine Type 05/22/2022 NotGiven     Strep A Culture 05/22/2022 Normal oral stacey, No Beta Strep isolated    Admission on 04/25/2022, Discharged on 04/25/2022   Component Date Value    Ventricular Rate 04/25/2022 102     Atrial Rate 04/25/2022 102     P-R Interval 04/25/2022 196     QRS Duration 04/25/2022 84     Q-T Interval 04/25/2022 344     QTc Calculation (Bazett) 04/25/2022 448     P Axis 04/25/2022 65     R Axis 04/25/2022 74     T Axis 04/25/2022 56     Diagnosis 04/25/2022 Sinus tachycardiaConfirmed by Jeannett Saint MD, Emili Wallace ([id number]) on 4/26/2022 11:43:08 AM     WBC 04/25/2022 9.3     RBC 04/25/2022 4.24     Hemoglobin 04/25/2022 11.6*    Hematocrit 04/25/2022 35.4*    MCV 04/25/2022 83.5     MCH 04/25/2022 27.4     MCHC 04/25/2022 32.8     RDW 04/25/2022 14.2     Platelets 02/94/8225 311     MPV 04/25/2022 7.5     Neutrophils % 04/25/2022 68.8     Lymphocytes % 04/25/2022 23.1     Monocytes % 04/25/2022 5.8     Eosinophils % 04/25/2022 1.8     Basophils % 04/25/2022 0.5     Neutrophils Absolute 04/25/2022 6.4     Lymphocytes Absolute 04/25/2022 2.1     Monocytes Absolute 04/25/2022 0.5     Eosinophils Absolute 04/25/2022 0.2     Basophils Absolute 04/25/2022 0.0     Sodium 04/25/2022 136     Potassium reflex Magnesi* 04/25/2022 4.6     Chloride 04/25/2022 98*    CO2 04/25/2022 29     Anion Gap 04/25/2022 9     Glucose 04/25/2022 140*    BUN 04/25/2022 16     CREATININE 04/25/2022 0.7     GFR Non- 04/25/2022 >60     GFR  04/25/2022 >60     Calcium 04/25/2022 10.2     Total Protein 04/25/2022 7.3     Albumin 04/25/2022 4.5     Albumin/Globulin Ratio 04/25/2022 1.6     Total Bilirubin 04/25/2022 <0.2     Alkaline Phosphatase 04/25/2022 67     ALT 04/25/2022 21     AST 04/25/2022 15     Color, UA 04/25/2022 Yellow     Clarity, UA 04/25/2022 Clear     Glucose, Ur 04/25/2022 Negative     Bilirubin Urine 04/25/2022 Negative     Ketones, Urine 04/25/2022 Negative     Specific Gravity, UA 04/25/2022 1.015     Blood, Urine 04/25/2022 Negative     pH, UA 04/25/2022 7.0     Protein, UA 04/25/2022 Negative     Urobilinogen, Urine 04/25/2022 0.2     Nitrite, Urine 04/25/2022 Negative     Leukocyte Esterase, Urine 04/25/2022 TRACE*    Microscopic Examination 04/25/2022 YES     Urine Type 04/25/2022 NotGiven     Urine Reflex to Culture 04/25/2022 Not Indicated     HCG(Urine) Pregnancy Test 04/25/2022 Negative     WBC, UA 04/25/2022 6-9*    RBC, UA 04/25/2022 0-2     Epithelial Cells, UA 04/25/2022 0-1     Bacteria, UA 04/25/2022 Rare*   Orders Only on 04/02/2022   Component Date Value    Rejected Test 04/02/2022 48,114     Reason for Rejection 04/02/2022 see below    Orders Only on 04/01/2022   Component Date Value    Vit D, 1,25-Dihydroxy 04/01/2022 30.2    Orders Only on 04/01/2022   Component Date Value    DHEA (Dehydroepiandroste* 04/01/2022 1.042*   Orders Only on 04/01/2022   Component Date Value    Testosterone 04/01/2022 9*    Sex Hormone Binding 04/01/2022 85     Testosterone, Free 04/01/2022 <1.0*   Orders Only on 04/01/2022   Component Date Value    Insulin, Free 04/01/2022 14     Insulin 04/01/2022 17    Orders Only on 04/01/2022   Component Date Value    Estradiol 04/01/2022 97    There may be more visits with results that are not included. Assessment/Plan:  Patient is a 10-year-old female with a past medical history of asthma, seizure disorder, chronic pain disorder, ADHD, anxiety and depression, polypharmacy, Johnie Danlos syndrome, factitious disorder, obesity who presents today for chronic care follow-up. 1. Left-sided face pain  - Likely secondary to trauma from seizure episode  - Unlikely that there is a bony fracture however the ease patient's anxiety will obtain x-rays of the skull.  - Patient is to follow-up in 3 months    2. Right foot pain  - Likely secondary to trauma  - Will obtain a right foot x-ray    3. Menorrhagia with regular cycle  - Not at goal  - Will trial patient on Seasonique  - Had a discussion with patient that being on birth control can decrease her seizure threshold. She stated understanding.   - Patient will follow up in 3 months    Health Maintenance Due:  There are no preventive care reminders to display for this patient.        Health care decision maker:  <72years old      Health Maintenance: (USPSTF Recommendations)  (F) Cervical Cancer Screen: (21-29 q3yr cytology alone; 30-65 q3yr cytology alone, q5yr with hrHPV alone, or q5yr cytology+hrHPV (A)): referral made  HIV Screen: (16-65 yr old, and all pregnant patients (A)): negative  Hep C Screen: (21-70 yr old (B)): negative   Immunizations:    RTC:  Return in about 3 months (around 9/30/2022) for follow up chronic conditions. EMR Dragon/transcription disclaimer:  Much of this encounter note is electronic transcription/translation of spoken language to printed texts. The electronic translation of spoken language may be erroneous, or at times, nonsensical words or phrases may be inadvertently transcribed.   Although I have reviewed the note for such errors, some may still exist.     Kevin Awad, DO   PGY-1   [facility] and [facility]

## 2022-06-30 NOTE — FLOWSHEET NOTE
[facility] [facility], [facility], [address] [place] [address]  Phone: [phone]  Fax [phone]        Physical Therapy  Cancellation/No-show Note  Patient Name:  Christin Burkett  :  1990   Date:  2022  Cancels to date: 2  No-shows to date: 0    For today's appointment patient:  [x] Cancelled  [] Rescheduled appointment  [] No-show     Reason given by patient:  [] Patient ill  [x] Conflicting appointment - seeing neurology today  [] No transportation    [] Conflict with work  [] No reason given  [] Other:     Comments:      Electronically signed by:  Heike Martin PT Curvilinear Excision Additional Text (Leave Blank If You Do Not Want): The margin was drawn around the clinically apparent lesion.  A curvilinear shape was then drawn on the skin incorporating the lesion and margins.  Incisions were then made along these lines to the appropriate tissue plane and the lesion was extirpated.

## 2022-07-01 ENCOUNTER — APPOINTMENT (OUTPATIENT)
Dept: PHYSICAL THERAPY | Age: 32
End: 2022-07-01
Payer: MEDICAID

## 2022-07-05 ENCOUNTER — HOSPITAL ENCOUNTER (OUTPATIENT)
Dept: PHYSICAL THERAPY | Age: 32
Setting detail: THERAPIES SERIES
Discharge: HOME | End: 2022-07-05
Payer: MEDICAID

## 2022-07-05 ENCOUNTER — APPOINTMENT (OUTPATIENT)
Dept: PHYSICAL THERAPY | Age: 32
End: 2022-07-05
Payer: MEDICAID

## 2022-07-05 PROCEDURE — 97113 AQUATIC THERAPY/EXERCISES: CPT

## 2022-07-05 PROCEDURE — 97150 GROUP THERAPEUTIC PROCEDURES: CPT

## 2022-07-05 NOTE — FLOWSHEET NOTE
Pec stretch          Post deltoid           Time In: 2:05pm     Timed Code Treatment Minutes:  15min    Total Treatment Minutes:  38 min (1 group, 1 individual aquatic TE)    Treatment/Activity Tolerance:   [x] Patient tolerated treatment well [] Patient limited by fatigue   [] Patient limited by pain [] Patient limited by other medical complications  [] Other:     Prognosis: [] Good [x] Fair  [] Poor    Patient Requires Follow-up:  [x] Yes  [] No    Plan: [x] Continue per plan of care [] Alter current plan (see comments)   [] Plan of care initiated [] Hold pending MD visit [] Discharge    See Weekly Progress Note: [] Yes  [x] No  Next due:

## 2022-07-06 ENCOUNTER — OFFICE VISIT (OUTPATIENT)
Dept: OBGYN CLINIC | Age: 32
End: 2022-07-06
Payer: MEDICAID

## 2022-07-06 ENCOUNTER — APPOINTMENT (OUTPATIENT)
Dept: PHYSICAL THERAPY | Age: 32
End: 2022-07-06
Payer: MEDICAID

## 2022-07-06 VITALS
BODY MASS INDEX: 36.05 KG/M2 | HEART RATE: 102 BPM | HEIGHT: 64 IN | DIASTOLIC BLOOD PRESSURE: 80 MMHG | SYSTOLIC BLOOD PRESSURE: 116 MMHG | TEMPERATURE: 97.9 F

## 2022-07-06 DIAGNOSIS — Z98.890 STATUS POST REPAIR OF LIGAMENT OF ANKLE: ICD-10-CM

## 2022-07-06 DIAGNOSIS — N64.52 NIPPLE DISCHARGE: ICD-10-CM

## 2022-07-06 DIAGNOSIS — G89.29 CHRONIC PAIN OF RIGHT ANKLE: ICD-10-CM

## 2022-07-06 DIAGNOSIS — M76.71 PERONEAL TENDINITIS OF RIGHT LOWER EXTREMITY: ICD-10-CM

## 2022-07-06 DIAGNOSIS — Z12.4 PAP SMEAR FOR CERVICAL CANCER SCREENING: ICD-10-CM

## 2022-07-06 DIAGNOSIS — M25.571 CHRONIC PAIN OF RIGHT ANKLE: ICD-10-CM

## 2022-07-06 DIAGNOSIS — Z01.419 WOMEN'S ANNUAL ROUTINE GYNECOLOGICAL EXAMINATION: Primary | ICD-10-CM

## 2022-07-06 DIAGNOSIS — Z12.39 SCREENING BREAST EXAMINATION: ICD-10-CM

## 2022-07-06 DIAGNOSIS — Z98.890 S/P ANKLE LIGAMENT REPAIR: ICD-10-CM

## 2022-07-06 DIAGNOSIS — Q79.60 EHLERS-DANLOS SYNDROME: ICD-10-CM

## 2022-07-06 DIAGNOSIS — Q66.70 PES CAVUS: ICD-10-CM

## 2022-07-06 DIAGNOSIS — E61.1 IRON DEFICIENCY: ICD-10-CM

## 2022-07-06 DIAGNOSIS — N92.0 MENORRHAGIA WITH REGULAR CYCLE: ICD-10-CM

## 2022-07-06 PROCEDURE — 99385 PREV VISIT NEW AGE 18-39: CPT | Performed by: OBSTETRICS & GYNECOLOGY

## 2022-07-06 NOTE — PROGRESS NOTES
[facility]   Annual Exam        CC:   Chief Complaint   Patient presents with    New Patient    Annual Exam       HPI:  33 y.o. [id number] presents for her gynecologic annual exam.  New to office, establishing care. Admits to regular but heavy / painful periods resulting in anemia -- PCP has started Cont OCPs (Levonorgest-Eth Estrad 91-Day 0.15-0.03 & 0.01) for bleeding, took first dose yesterday. Use and return precautions reviewed. AUB labs from PCP in  reviewed, normal.   Also c/o bilateral nipple discharge -- occurs with foreplay / stimulation. Stopped BF 1 yr ago. GYN hx significant for  x4, PTB x 1, Recent rupture of ovarian cyst.   Family Hx significant for Mat GM w/ BC (at 80), Mat GGM w/ unk uterine CA or Breast CA. Health Maintenance:  Safe Enviroment: y  Sexually Active: y   y sexual problems  Contraception: none     Screening:  Last pap smear: 3yrs ago, normal   History of abnormal pap smears: denies   STI History: denies       Review of Systems -- Pert (+) noted above   Constitutional: Negative for appetite change, chills and fever. HENT: Negative for congestion, sneezing and sore throat. Eyes: Negative for visual disturbance. Respiratory: Negative for chest tightness, shortness of breath and wheezing. Cardiovascular: Negative for chest pain, palpitations and leg swelling. Gastrointestinal: Negative for abdominal pain, diarrhea, nausea and vomiting. Endocrine: Negative for heat intolerance. Genitourinary: Negative for difficulty urinating, dyspareunia, dysuria, menstrual problem and pelvic pain. Musculoskeletal: Negative for back pain, neck pain and neck stiffness. Skin: Negative for color change, pallor and rash. Allergic/Immunologic: Negative for environmental allergies, food allergies and immunocompromised state. Neurological: Negative for light-headedness, numbness and headaches. Hematological: Does not bruise/bleed easily.    Psychiatric/Behavioral: Negative for behavioral problems, sleep disturbance and suicidal ideas. Primary Care Physician: Marquis Aguirre DO    Obstetric History  OB History    Para Term  AB Living   5 4 3 1 1 4   SAB IAB Ectopic Molar Multiple Live Births   1 0 0   0 4      # Outcome Date GA Lbr Owen/2nd Weight Sex Delivery Anes PTL Lv   5 Term 02/15/19    F Vag-Spont  Y BRYAN   4 Term 10/25/16    M Vag-Spont   BRYAN   3 SAB 2016     SAB   ND      Complications: History of D&C   2  14 35w4d 05:36 6 lb 4.9 oz (2.86 kg) M Vag-Spont   BRYAN   1 Term 10/05/12 37w6d  8 lb 3 oz (3.714 kg) M Vag-Spont EPI N BRYAN       Gynecologic History  Menstrual History:   Menarche: 4 yoa   LMP: Patient's last menstrual period was 2022.  Menstrual Period: regular   Interval Between Menses: 30d   Duration of Menses: 5-6d   Menstrual Flow: very heavy   o Uses CUP, changes about 2-3 hrs before leaking    Bleeding between menses: Denies   Pain: (+) uses muscle relaxer / ibuprofen      MedicalHistory:  Past Medical History:   Diagnosis Date    ADHD (attention deficit hyperactivity disorder)     Anemia     Anxiety     Asthma     Autism     Autoimmune disorder (HCC)     Chronic back pain     Depression     Johnie-Danlos syndrome     Migraine     Neurologic disorder     Postpartum depression      delivery      labor     Seizures (HCC)     Trauma        Medications:  Current Outpatient Medications   Medication Sig Dispense Refill    Levonorgest-Eth Estrad -Day 0.15-0.03 &0.01 MG TABS Take 1 tablet by mouth daily 1 packet 3    gabapentin (NEURONTIN) 800 MG tablet Take 1 tablet by mouth 3 times daily for 15 days.  48 tablet 0    diclofenac (VOLTAREN) 75 MG EC tablet Take 1 tablet by mouth 2 times daily 60 tablet 3    ferrous sulfate (IRON 325) 325 (65 Fe) MG tablet Take 1 tablet by mouth daily (with breakfast) 30 tablet 1    OXcarbazepine (TRILEPTAL) 600 MG tablet Take 1 tablet by mouth 2 times daily 60 tablet 2    prazosin (MINIPRESS) 1 MG capsule Take 1 mg by mouth nightly Take 3 capsules by mouth at bedtime      loratadine-pseudoephedrine (CLARITIN-D 24HR)  MG per extended release tablet Take 1 tablet by mouth daily      risperiDONE (RISPERDAL) 1 MG tablet Take 1 mg by mouth 2 times daily      LORazepam (ATIVAN PO) Take 2.5 mg by mouth       medical marijuana Take by mouth as needed.  budesonide-formoterol (SYMBICORT) 80-4.5 MCG/ACT AERO Inhale 2 puffs into the lungs 2 times daily 10.2 g 3    amphetamine-dextroamphetamine (ADDERALL XR) 30 MG extended release capsule Take 30 mg by mouth every morning.  amphetamine-dextroamphetamine (ADDERALL, 15MG,) 15 MG tablet Take 15 mg by mouth 2 times daily.  PRAZOSIN HCL PO Take 4 mg by mouth       albuterol sulfate HFA (VENTOLIN HFA) 108 (90 Base) MCG/ACT inhaler Inhale 2 puffs into the lungs 4 times daily as needed for Wheezing 18 g 5    gabapentin (NEURONTIN) 800 MG tablet Take 1 tablet by mouth 3 times daily for 30 days. 90 tablet 2    EPINEPHrine (EPIPEN 2-KOREY) 0.3 MG/0.3ML SOAJ injection Inject 0.3 mLs into the skin once for 1 dose Use as directed for allergic reaction 0.3 mL 0     No current facility-administered medications for this visit. Surgical History:  Past Surgical History:   Procedure Laterality Date    ANKLE FRACTURE SURGERY Right 05/2021    CHOLECYSTECTOMY      DILATION AND CURETTAGE      EYE SURGERY      tear ducts    WRIST GANGLION EXCISION         Allergies:   Allergies   Allergen Reactions    Macadamia Nut Oil Hives, Itching and Anaphylaxis    Other Anaphylaxis and Hives     Tree nuts    Penicillins Rash and Hives    Albumen, Egg Other (See Comments)    Erythromycin      Other reaction(s): Loss of Consciousness    Keppra [Levetiracetam]      Extreme rage    Levaquin [Levofloxacin]      Nerve and muscle issues    Lexapro [Escitalopram]     Peanut (Diagnostic) Other (See Comments)    Wellbutrin [Bupropion] Other (See Comments)     Suicidal ideation    Azithromycin Rash       Family History:  Family History   Problem Relation Age of Onset    Diabetes Father     High Blood Pressure Father     Heart Disease Father     High Blood Pressure Paternal Grandmother     Diabetes Paternal Grandmother     Stroke Paternal Grandmother     Depression Paternal Grandmother     Dementia Paternal Grandmother     Heart Disease Paternal Grandmother        Social History:  Social History     Socioeconomic History    Marital status:      Spouse name: Antonia Ludwig Number of children: 3    Years of education: None    Highest education level: None   Occupational History    Occupation: barista   Tobacco Use    Smoking status: Never Smoker    Smokeless tobacco: Never Used   Vaping Use    Vaping Use: Every day    Substances: Nicotine, Flavoring, D-8    Substance and Sexual Activity    Alcohol use: No    Drug use: Yes     Types: Marijuana Lisa Hernandez)     Comment: daily medical    Sexual activity: Yes     Partners: Female   Other Topics Concern    None   Social History Narrative    None     Social Determinants of Health     Financial Resource Strain: Low Risk     Difficulty of Paying Living Expenses: Not hard at all   Food Insecurity: No Food Insecurity    Worried About Running Out of Food in the Last Year: Never true    Joaquín of Food in the Last Year: Never true   Transportation Needs: No Transportation Needs    Lack of Transportation (Medical): No    Lack of Transportation (Non-Medical):  No   Physical Activity:     Days of Exercise per Week: Not on file    Minutes of Exercise per Session: Not on file   Stress:     Feeling of Stress : Not on file   Social Connections:     Frequency of Communication with Friends and Family: Not on file    Frequency of Social Gatherings with Friends and Family: Not on file    Attends Mu-ism Services: Not on file    Active Member of Clubs or Organizations: Not on file    Attends Club or Organization Meetings: Not on file    Marital Status: Not on file   Intimate Partner Violence:     Fear of Current or Ex-Partner: Not on file    Emotionally Abused: Not on file    Physically Abused: Not on file    Sexually Abused: Not on file   Housing Stability:     Unable to Pay for Housing in the Last Year: Not on file    Number of [place] in the Last Year: Not on file    Unstable Housing in the Last Year: Not on file       Objective: Body mass index is 36.05 kg/m². /80 (Site: Left Upper Arm, Position: Sitting, Cuff Size: Large Adult)   Pulse (!) 102   Temp 97.9 °F (36.6 °C) (Infrared)   Ht 5' 4\" (1.626 m)   LMP 06/29/2022   BMI 36.05 kg/m²     Exam:   General: Alert, well appearing, no acute distress  Head: Normocephalic, atraumatic  Mouth: Mucous membranes moist, pharynx normal without lesions  Thyroid: No thyromegaly or masses present   Breasts: Symmetric, non-tender to palpation, no skin changes, palpable axillary lymph nodes or masses noted -- (+) BL clear, white nipple discharge when expressed, no 0ther abnormalities noted. Lungs: Respiratory effort within normal limits   CV: Regular rate   Abdomen: Soft, nontender, nondistended   Pelvic:   External: Normal appearing genitalia without masses, tenderness or lesions  Vagina: moist, pink mucosa, without abnormal discharge or lesions  Cervix: no masses or lesions visualized, no cervical motion tenderness  Uterus: mobile, midline, no masses palpated  Adnexa: mobile, non-tender to palpation, without masses  Rectovaginal: deferred  Extremities: No redness or tenderness, neg Yudelka's sign  Skin: Well perfused, normal coloration and turgor, no lesions or rashes visualized  Neuro: Alert, oriented, normal speech, no focal deficits, moves extremities appropriately  Osteopathic: No TART changes    Assessment/Plan:  33 y.o. [id number] presenting for her annual exam:     Diagnosis Orders   1.  Women's annual routine gynecological examination     2. Pap smear for cervical cancer screening  PAP SMEAR   3. Screening breast examination     4. Nipple discharge     5. Iron deficiency     6. Menorrhagia with regular cycle     7. BMI 36.0-36.9,adult        - normal breast and pelvic exam today except noted above   - recent AUB labs from PCP normal   - reviewed contraceptive use and other options for AUB if needed  - declines STI screen today     Annual Visit Recommendations:   Self-breast exam and self-breast awareness reviewed. Pelvic exam was done and ASCCP guidelines reviewed   Reviewed healthy diet and daily multivitamin use. Reviewed recommendations on Calcium and Vitamin D intake. Discussed seatbelt use. Follow Up  - Will call patient with results   -Return in about 1 year (around 7/6/2023) for Annual or sooner if needed.     Jaunita Spatz, DO

## 2022-07-07 ENCOUNTER — APPOINTMENT (OUTPATIENT)
Dept: PHYSICAL THERAPY | Age: 32
End: 2022-07-07
Payer: MEDICAID

## 2022-07-07 ENCOUNTER — HOSPITAL ENCOUNTER (OUTPATIENT)
Dept: PHYSICAL THERAPY | Age: 32
Setting detail: THERAPIES SERIES
Discharge: HOME | End: 2022-07-07
Payer: MEDICAID

## 2022-07-07 PROCEDURE — 97113 AQUATIC THERAPY/EXERCISES: CPT

## 2022-07-07 PROCEDURE — 97150 GROUP THERAPEUTIC PROCEDURES: CPT

## 2022-07-07 RX ORDER — GABAPENTIN 800 MG/1
TABLET ORAL
Qty: 48 TABLET | Refills: 1 | Status: SHIPPED | OUTPATIENT
Start: 2022-07-07 | End: 2022-08-06

## 2022-07-07 NOTE — FLOWSHEET NOTE
[address] [facility], [facility], [address], [address]   Phone: [phone]  Fax [phone]        Physical Therapy Aquatic Flow Sheet  Date:  7/7/2022    Patient Name:  Christin Burkett    Restrictions: Spoke with patient about seizures, states \"they are controlled and I get tingling when I am going to have one\". Patient states she feels she has enough warning prior to seizures to exit the pool. Will continue to monitor. Diagnosis:   Q79.60 Johnie-Danlos Syndrome, J019.947 s/p R ankle ligament repair  Treatment Diagnosis:  R ankle pain, hypermobility  Insurance/Certification information:  Medicaid  Plan of care signed (Y/N):  yes  Visit# / total visits:  9/12 (6 land-based visits completed in this episode)  Pain level: 5/10 \"yes I have pain, I have pain everyday, today my ankle\".  Electronically signed by:  Heike Martin, PT      Soler  B= Belt DB= Dumbells T= Theratube   H= Hydrotone N= Noodles W= Weights   P= Paddles S= Speedo equipment K= Kickboard     Exercises/Activities   Warm-up/Amb    Exercises      Slow forward  x2laps  HR/TR  x12    Slow sideways  x2laps  Marches  x2min    Slow backwards  x2laps  Mini-squats  x12    Medium forward    3-way SLR  x12B    Medium sideways    Hip circles/fig 8  x12B    Small shuffle    Hamstring curls  x12B    Jog    Knee extension  x12B    Braiding    Pelvic tilts  [id number]     Bicycling  x2min  Scap squeezes          Shoulder flex/ext  x12B w/TA and tandem w/ (R) foot forward     Functional    Shoulder abd/add  x12B w/TA and tandem w/ (R) foot forward     Step    Shoulder H. abd/add  x12B w/TA and tandem w/ (R) foot forward     Lifting    Shoulder IR/ER      Hand to opp knee    Rowing      Push down squat    Bilateral pull down      UE PNF    Push/pull      LE PNF    Push downs      Wall push ups    Arm circles      SLS    Elbow flex/ext          Chin tuck      Stretching    UT shrugs/rolls Gastroc/Soleus  Rocking horse      Hamstring  [id number]       SKTC    Other      Piriformis    Tandem [id number]    Hip flexor    ankle alphabet x1 B    Ladder pull    amb after ex x5' fwd      Pec stretch          Post deltoid           Time In: 2:00pm     Timed Code Treatment Minutes:  15min    Total Treatment Minutes:  46 min (1 group, 1 individual aquatic TE)    Treatment/Activity Tolerance:   [x] Patient tolerated treatment well [] Patient limited by fatigue   [] Patient limited by pain [] Patient limited by other medical complications  [] Other:     Prognosis: [] Good [x] Fair  [] Poor    Patient Requires Follow-up:  [x] Yes  [] No    Plan: [x] Continue per plan of care [] Alter current plan (see comments)   [] Plan of care initiated [] Hold pending MD visit [] Discharge    See Weekly Progress Note: [] Yes  [x] No  Next due:

## 2022-07-08 ENCOUNTER — APPOINTMENT (OUTPATIENT)
Dept: PHYSICAL THERAPY | Age: 32
End: 2022-07-08
Payer: MEDICAID

## 2022-07-08 ENCOUNTER — OFFICE VISIT (OUTPATIENT)
Dept: ORTHOPEDIC SURGERY | Age: 32
End: 2022-07-08
Payer: MEDICAID

## 2022-07-08 VITALS — HEIGHT: 64 IN | WEIGHT: 210 LBS | BODY MASS INDEX: 35.85 KG/M2

## 2022-07-08 DIAGNOSIS — S90.31XA CONTUSION OF RIGHT FOOT, INITIAL ENCOUNTER: ICD-10-CM

## 2022-07-08 DIAGNOSIS — Z98.890 STATUS POST REPAIR OF LIGAMENT OF ANKLE: ICD-10-CM

## 2022-07-08 DIAGNOSIS — M25.571 CHRONIC PAIN OF RIGHT ANKLE: Primary | ICD-10-CM

## 2022-07-08 DIAGNOSIS — G89.29 CHRONIC PAIN OF RIGHT ANKLE: Primary | ICD-10-CM

## 2022-07-08 PROCEDURE — 99213 OFFICE O/P EST LOW 20 MIN: CPT | Performed by: ORTHOPAEDIC SURGERY

## 2022-07-08 NOTE — PROGRESS NOTES
[address] [facility]  Outpatient Progress Note  Cecilio Riggs MD    Patient Name: Joselin Urrutia MRN: [id number]   Age: 33 y.o. YOB: 1990   Sex: female      [employer] Complaint   Patient presents with    New Patient     Right Ankle Pain        HISTORY OF PRESENT ILLNESS   Joselin Urrutia is a 33 y.o. female   The patient presents for a follow up visit:  The patient tells me that in May 2021 she had an inversion injury to her right ankle and was diagnosed with a sprain which was initially treated with the appropriate modalities including rest ice elevation and immobilization but she failed to improve. At the time she was living in [place]. She was treated by an orthopedic surgeon there. She was indicated for surgery. I do not have any medical records for review but by patient report and by examining her surgical incisions it appears she had ankle arthroscopy and a Broström type repair. She tells me following the surgery she was immobilized in a splint and then a cast and then was transition to a removable walking cast boot. Surgery took place in October 2021. She tells me she last saw her surgeon in February 2022 and at that time he had advised her to continue in the walking cast boot. In the meantime she has moved to [place]. She continues to have discomfort and swelling in the ankle and presented to the office today for further treatment recommendations. 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 Initially seen by me in consultation on April 1, 2022. My opinion was that she had been under rehabilitated following her ankle ligament reconstruction surgery. I referred her to physical therapy. She has failed to progress. She continues to complain of severe pain in the lateral aspect of her right ankle. In the interim she has seen Dr. Refugio Gr for second opinion consultation. he ordered an MRI scan. He also made referral to pain management. Patient reports she has had difficulty finding a pain management provider who accepts her insurance. She reports she has been out of pain medication since January. Her primary care physician has informed her that she will not prescribe for her as her last urine drug screen was positive for THC and amphetamine. In addition she tells me she dropped a chair on the dorsal aspect of her forefoot last week and has had some increased pain.     PAST MEDICAL HISTORY      Past Medical History:   Diagnosis Date    ADHD (attention deficit hyperactivity disorder)     Anemia     Anxiety     Asthma     Autism     Autoimmune disorder (HCC)     Chronic back pain     Depression     Johnie-Danlos syndrome     Migraine     Neurologic disorder     Postpartum depression      delivery      labor     Seizures ([facility] [address]     Trauma        PAST SURGICAL HISTORY     Past Surgical History:   Procedure Laterality Date    ANKLE FRACTURE SURGERY Right 2021    CHOLECYSTECTOMY      DILATION AND CURETTAGE      EYE SURGERY      tear ducts    WRIST GANGLION EXCISION         MEDICATIONS     Current Outpatient Medications   Medication Sig Dispense Refill    gabapentin (NEURONTIN) 800 MG tablet TAKE ONE TABLET BY MOUTH THREE TIMES A DAY FOR 15 DAYS 48 tablet 1    Levonorgest-Eth Estrad -Day 0.15-0.03 &0.01 MG TABS Take 1 tablet by mouth daily 1 packet 3    diclofenac (VOLTAREN) 75 MG EC tablet Take 1 tablet by mouth 2 times daily 60 tablet 3    ferrous sulfate (IRON 325) 325 (65 Fe) MG tablet Take 1 tablet by mouth daily (with breakfast) 30 tablet 1    OXcarbazepine (TRILEPTAL) 600 MG tablet Take 1 tablet by mouth 2 times daily 60 tablet 2    prazosin (MINIPRESS) 1 MG capsule Take 1 mg by mouth nightly Take 3 capsules by mouth at bedtime      loratadine-pseudoephedrine (CLARITIN-D 24HR)  MG per extended release tablet Take 1 tablet by mouth daily      risperiDONE (RISPERDAL) 1 MG tablet Take 1 mg by mouth 2 times daily      LORazepam (ATIVAN PO) Take 2.5 mg by mouth       medical marijuana Take by mouth as needed.  budesonide-formoterol (SYMBICORT) 80-4.5 MCG/ACT AERO Inhale 2 puffs into the lungs 2 times daily 10.2 g 3    amphetamine-dextroamphetamine (ADDERALL XR) 30 MG extended release capsule Take 30 mg by mouth every morning.  amphetamine-dextroamphetamine (ADDERALL, 15MG,) 15 MG tablet Take 15 mg by mouth 2 times daily.  gabapentin (NEURONTIN) 800 MG tablet Take 1 tablet by mouth 3 times daily for 30 days. 90 tablet 2    PRAZOSIN HCL PO Take 4 mg by mouth       albuterol sulfate HFA (VENTOLIN HFA) 108 (90 Base) MCG/ACT inhaler Inhale 2 puffs into the lungs 4 times daily as needed for Wheezing 18 g 5    EPINEPHrine (EPIPEN 2-KOREY) 0.3 MG/0.3ML SOAJ injection Inject 0.3 mLs into the skin once for 1 dose Use as directed for allergic reaction 0.3 mL 0     No current facility-administered medications for this visit.        ALLERGIES     Allergies   Allergen Reactions    Macadamia Nut Oil Hives, Itching and Anaphylaxis    Other Anaphylaxis and Hives     Tree nuts    Penicillins Rash and Hives    Albumen, Egg Other (See Comments)    Erythromycin      Other reaction(s): Loss of Consciousness    Keppra [Levetiracetam]      Extreme rage    Levaquin [Levofloxacin]      Nerve and muscle issues    Lexapro [Escitalopram]     Peanut (Diagnostic) Other (See Comments)    Wellbutrin [Bupropion] Other (See Comments)     Suicidal ideation    Azithromycin Rash       FAMILY HISTORY     Family History   Problem Relation Age of Onset    Diabetes Father     High Blood Pressure Father     Heart Disease Father     High Blood Pressure Paternal Grandmother     Diabetes Paternal Grandmother     Stroke Paternal Grandmother     Depression Paternal Grandmother     Dementia Paternal Grandmother     Heart Disease Paternal Grandmother        SOCIAL HISTORY     Social History     Socioeconomic History  Marital status:      Spouse name: Maria Teresa Centeno Number of children: 3    Years of education: None    Highest education level: None   Occupational History    Occupation: barista   Tobacco Use    Smoking status: Never Smoker    Smokeless tobacco: Never Used   Vaping Use    Vaping Use: Every day    Substances: Nicotine, Flavoring, D-8    Substance and Sexual Activity    Alcohol use: No    Drug use: Yes     Types: Marijuana Estil Catena)     Comment: daily medical    Sexual activity: Yes     Partners: Female   Other Topics Concern    None   Social History Narrative    None     Social Determinants of Health     Financial Resource Strain: Low Risk     Difficulty of Paying Living Expenses: Not hard at all   Food Insecurity: No Food Insecurity    Worried About Running Out of Food in the Last Year: Never true    Joaquín of Food in the Last Year: Never true   Transportation Needs: No Transportation Needs    Lack of Transportation (Medical): No    Lack of Transportation (Non-Medical):  No   Physical Activity:     Days of Exercise per Week: Not on file    Minutes of Exercise per Session: Not on file   Stress:     Feeling of Stress : Not on file   Social Connections:     Frequency of Communication with Friends and Family: Not on file    Frequency of Social Gatherings with Friends and Family: Not on file    Attends [employer] Services: Not on file    Active Member of [address] [place] [address] or Organizations: Not on file    Attends Club or Organization Meetings: Not on file    Marital Status: Not on file   Intimate Partner Violence:     Fear of Current or Ex-Partner: Not on file    Emotionally Abused: Not on file    Physically Abused: Not on file    Sexually Abused: Not on file   Housing Stability:     Unable to Pay for Housing in the Last Year: Not on file    Number of [place] in the Last Year: Not on file    Unstable Housing in the Last Year: Not on file       [address]: no fever, chills, night sweats, anorexia, malaise, fatigue, or weight change  Hematologic:  no unexplained bleeding or bruising  HEENT:   no nasal congestion, rhinorrhea, sore throat, or facial pain  Respiratory:  no cough, dyspnea, or chest pain  Cardiovascular:  no angina, LOVETT, PND, orthopnea, dependent edema, or palpitations  Gastrointestinal:  no nausea, vomiting, diarrhea, constipation, or abdominal pain  Genitourinary:  no urinary urgency, frequency, dysuria, or hematuria  Musculoskeletal: see HPI  Endocrine:  no heat or cold intolerance and no polyphagia, polydipsia, or polyuria  Skin:  no skin eruptions or changing lesions  Neurologic:  no focal weakness, numbness/tingling, tremor, or severe headache. See HPI. See HPI for pertinent positives. PHYSICAL EXAM   Vital Signs: Ht 5' 4\" (1.626 m)   Wt 210 lb (95.3 kg)   LMP 06/29/2022   BMI 36.05 kg/m²     General appearance: healthy, alert, no distress  Skin: Skin color, texture, turgor normal. No rashes or lesions  HEENT: atraumatic, normocephalic. PERRL  Respiratory: Unlabored breathing  Lymphatic: No adenopathy   Neuro: Alert and oriented, normal distal sensation, normal bilateral DTRs  Vascular: Normal distal capillary and distal pulses  Muskuloskeletal Exam:   When I entered the room the patient was sitting crosslegged on the chair in the exam room with the majority of her weight on the lateral aspect of the right ankle. She reported no pain in this position. Inspection of the ankle otherwise demonstrates no swelling erythema warmth ecchymosis or edema. Her surgical incision is well-healed. Her ankle range of motion is full. I am unable to elicit any instability with anterior drawer varus stress testing. She has nonspecific tenderness in the lateral ankle and foot. She also had some nonspecific tenderness in the dorsal forefoot. There is no deformity.   Weightbearing alignment of the ankle hindfoot midfoot and forefoot are neutral.  Her gait appears normal.    RADIOLOGY   X-rays obtained and reviewed in office:  Views right foot 3 views    Impression: No acute abnormality    MRI scan of the right ankle    CONCLUSION:   1. Status post peroneus brevis rerouting procedure. Tendinopathy/edema with the suggestion of    small, low-grade partial tear of the rerouted segment of the peroneus brevis. Low-grade    inframalleolar peroneus longus tendinosis. 2. Chronic sprain/impingement and the suggestion of low-grade partial tear of the native ATFL. Chronic mild to moderate CFL edema/sprain. Scarring of the anterolateral gutter capsule. My read: Intact lateral ligament reconstruction using rerouted peroneus brevis other findings otherwise consistent with postsurgical changes    IMPRESSION     1. Chronic pain of right ankle    2. Contusion of right foot, initial encounter    3. Status post repair of ligament of ankle           PLAN   The patient has chronic pain in the lateral aspect of the right ankle status post lateral ligament reconstruction done approximately 8 months ago by physician in [place]. She has been immobilized in a cast boot for these 8 months without significant physical therapy until recently. She has failed to progress in PT however. Physical examination and radiographs findings do not indicate any surgical pathology however. My recommendation would be for her to transition out of the cast boot into an accommodative athletic shoe and continuing physical therapy treating her other symptoms of pain and swelling with ice elevation and over-the-counter analgesic medications. Patient wishes to be referred to pain management. She was under the care of a pain management physician in [place] prior to moving to [place]. We will make referral to Dr. Flonnie Kussmaul. She also wishes to be referred to other foot and ankle surgeons for additional opinions. We have given her contact information for Dr. Nataliya Lewis and Dr. Delores Franz.     FOLLOWUP     No follow-ups on file. Orders Placed This Encounter   Procedures    XR FOOT RIGHT (MIN 3 VIEWS)     Standing Status:   Future     Number of Occurrences:   1     Standing Expiration Date:   7/8/2023     Order Specific Question:   Reason for exam:     Answer:   PAIN    [facility] ([address]) - Lizzette Frederick MD, Pain Management, [facility]     Referral Priority:   Routine     Referral Type:   Eval and Treat     Referral Reason:   Specialty Services Required     Referred to Provider:   Maximiliano Oneill MD     Requested Specialty:   PAIN MEDICINE INTERVENTIONAL PAIN MEDICINE     Number of Visits Requested:   1    External Referral To Orthopedic Surgery     Referral Priority:   Routine     Referral Type:   Eval and Treat     Referral Reason:   Specialty Services Required     Referred to Provider:   Mason Noonan MD     Requested Specialty:   Orthopedic Surgery     Number of Visits Requested:   1    External Referral To Orthopedic Surgery     Referral Priority:   Routine     Referral Type:   Eval and Treat     Referral Reason:   Specialty Services Required     Referred to Provider:   Dee Gama MD     Requested Specialty:   Orthopedic Surgery     Number of Visits Requested:   1      No orders of the defined types were placed in this encounter.       Patient was instructed on appropriate use of braces, participation in home exercise programs, healthy lifestyle choices and weight loss as appropriate     Jessa Adame MD

## 2022-07-09 LAB
HPV COMMENT: NORMAL
HPV TYPE 16: NOT DETECTED
HPV TYPE 18: NOT DETECTED
HPVOH (OTHER TYPES): NOT DETECTED

## 2022-07-13 ENCOUNTER — NURSE ONLY (OUTPATIENT)
Dept: PRIMARY CARE CLINIC | Age: 32
End: 2022-07-13

## 2022-07-13 ENCOUNTER — APPOINTMENT (OUTPATIENT)
Dept: PHYSICAL THERAPY | Age: 32
End: 2022-07-13
Payer: MEDICAID

## 2022-07-13 DIAGNOSIS — Z11.52 ENCOUNTER FOR SCREENING FOR COVID-19: ICD-10-CM

## 2022-07-13 DIAGNOSIS — R35.0 FREQUENCY OF URINATION: Primary | ICD-10-CM

## 2022-07-13 DIAGNOSIS — M54.50 LOW BACK PAIN WITHOUT SCIATICA, UNSPECIFIED BACK PAIN LATERALITY, UNSPECIFIED CHRONICITY: ICD-10-CM

## 2022-07-14 ENCOUNTER — HOSPITAL ENCOUNTER (OUTPATIENT)
Dept: PHYSICAL THERAPY | Age: 32
Setting detail: THERAPIES SERIES
Discharge: HOME | End: 2022-07-14
Payer: MEDICAID

## 2022-07-14 LAB — SARS-COV-2: NOT DETECTED

## 2022-07-16 ENCOUNTER — APPOINTMENT (OUTPATIENT)
Dept: GENERAL RADIOLOGY | Age: 32
End: 2022-07-16
Payer: MEDICAID

## 2022-07-16 ENCOUNTER — HOSPITAL ENCOUNTER (EMERGENCY)
Age: 32
Discharge: HOME | End: 2022-07-16
Payer: MEDICAID

## 2022-07-16 VITALS
SYSTOLIC BLOOD PRESSURE: 118 MMHG | HEART RATE: 112 BPM | WEIGHT: 210 LBS | OXYGEN SATURATION: 98 % | RESPIRATION RATE: 22 BRPM | TEMPERATURE: 98.6 F | DIASTOLIC BLOOD PRESSURE: 67 MMHG | HEIGHT: 64 IN | BODY MASS INDEX: 35.85 KG/M2

## 2022-07-16 DIAGNOSIS — R53.83 FATIGUE, UNSPECIFIED TYPE: ICD-10-CM

## 2022-07-16 DIAGNOSIS — U07.1 COVID: Primary | ICD-10-CM

## 2022-07-16 DIAGNOSIS — R11.0 NAUSEA: ICD-10-CM

## 2022-07-16 LAB
A/G RATIO: 1.4 (ref 1.1–2.2)
ALBUMIN SERPL-MCNC: 3.8 G/DL (ref 3.4–5)
ALP BLD-CCNC: 55 U/L (ref 40–129)
ALT SERPL-CCNC: 15 U/L (ref 10–40)
ANION GAP SERPL CALCULATED.3IONS-SCNC: 12 MMOL/L (ref 3–16)
AST SERPL-CCNC: 14 U/L (ref 15–37)
BASOPHILS ABSOLUTE: 0 K/UL (ref 0–0.2)
BASOPHILS RELATIVE PERCENT: 0.5 %
BILIRUB SERPL-MCNC: <0.2 MG/DL (ref 0–1)
BILIRUBIN URINE: NEGATIVE
BLOOD, URINE: NEGATIVE
BUN BLDV-MCNC: 12 MG/DL (ref 7–20)
CALCIUM SERPL-MCNC: 8.7 MG/DL (ref 8.3–10.6)
CHLORIDE BLD-SCNC: 97 MMOL/L (ref 99–110)
CLARITY: CLEAR
CO2: 21 MMOL/L (ref 21–32)
COLOR: YELLOW
CREAT SERPL-MCNC: 0.6 MG/DL (ref 0.6–1.1)
EOSINOPHILS ABSOLUTE: 0 K/UL (ref 0–0.6)
EOSINOPHILS RELATIVE PERCENT: 0.4 %
GFR AFRICAN AMERICAN: >60
GFR NON-AFRICAN AMERICAN: >60
GLUCOSE BLD-MCNC: 88 MG/DL (ref 70–99)
GLUCOSE URINE: NEGATIVE MG/DL
HCT VFR BLD CALC: 29.9 % (ref 36–48)
HEMOGLOBIN: 9.9 G/DL (ref 12–16)
KETONES, URINE: NEGATIVE MG/DL
LACTIC ACID: 1.6 MMOL/L (ref 0.4–2)
LEUKOCYTE ESTERASE, URINE: NEGATIVE
LYMPHOCYTES ABSOLUTE: 0.4 K/UL (ref 1–5.1)
LYMPHOCYTES RELATIVE PERCENT: 8.1 %
MCH RBC QN AUTO: 26.8 PG (ref 26–34)
MCHC RBC AUTO-ENTMCNC: 33.1 G/DL (ref 31–36)
MCV RBC AUTO: 81 FL (ref 80–100)
MICROSCOPIC EXAMINATION: NORMAL
MONOCYTES ABSOLUTE: 0.4 K/UL (ref 0–1.3)
MONOCYTES RELATIVE PERCENT: 7.5 %
NEUTROPHILS ABSOLUTE: 3.9 K/UL (ref 1.7–7.7)
NEUTROPHILS RELATIVE PERCENT: 83.5 %
NITRITE, URINE: NEGATIVE
PDW BLD-RTO: 15.3 % (ref 12.4–15.4)
PH UA: 7 (ref 5–8)
PLATELET # BLD: 207 K/UL (ref 135–450)
PMV BLD AUTO: 8.6 FL (ref 5–10.5)
POTASSIUM SERPL-SCNC: 3.2 MMOL/L (ref 3.5–5.1)
PROTEIN UA: NEGATIVE MG/DL
RBC # BLD: 3.69 M/UL (ref 4–5.2)
SODIUM BLD-SCNC: 130 MMOL/L (ref 136–145)
SPECIFIC GRAVITY UA: 1.02 (ref 1–1.03)
TOTAL PROTEIN: 6.6 G/DL (ref 6.4–8.2)
TROPONIN: <0.01 NG/ML
URINE TYPE: NORMAL
UROBILINOGEN, URINE: 0.2 E.U./DL
WBC # BLD: 4.7 K/UL (ref 4–11)

## 2022-07-16 PROCEDURE — 6360000002 HC RX W HCPCS: Performed by: NURSE PRACTITIONER

## 2022-07-16 PROCEDURE — 93005 ELECTROCARDIOGRAM TRACING: CPT | Performed by: NURSE PRACTITIONER

## 2022-07-16 PROCEDURE — 85025 COMPLETE CBC W/AUTO DIFF WBC: CPT

## 2022-07-16 PROCEDURE — 84484 ASSAY OF TROPONIN QUANT: CPT

## 2022-07-16 PROCEDURE — 96361 HYDRATE IV INFUSION ADD-ON: CPT

## 2022-07-16 PROCEDURE — 6370000000 HC RX 637 (ALT 250 FOR IP): Performed by: NURSE PRACTITIONER

## 2022-07-16 PROCEDURE — 80053 COMPREHEN METABOLIC PANEL: CPT

## 2022-07-16 PROCEDURE — 83605 ASSAY OF LACTIC ACID: CPT

## 2022-07-16 PROCEDURE — 2580000003 HC RX 258: Performed by: NURSE PRACTITIONER

## 2022-07-16 PROCEDURE — 71045 X-RAY EXAM CHEST 1 VIEW: CPT

## 2022-07-16 PROCEDURE — 81003 URINALYSIS AUTO W/O SCOPE: CPT

## 2022-07-16 PROCEDURE — 99285 EMERGENCY DEPT VISIT HI MDM: CPT

## 2022-07-16 PROCEDURE — 96374 THER/PROPH/DIAG INJ IV PUSH: CPT

## 2022-07-16 RX ORDER — 0.9 % SODIUM CHLORIDE 0.9 %
1000 INTRAVENOUS SOLUTION INTRAVENOUS ONCE
Status: COMPLETED | OUTPATIENT
Start: 2022-07-16 | End: 2022-07-16

## 2022-07-16 RX ORDER — BUTALBITAL, ACETAMINOPHEN AND CAFFEINE 50; 325; 40 MG/1; MG/1; MG/1
2 TABLET ORAL ONCE
Status: COMPLETED | OUTPATIENT
Start: 2022-07-16 | End: 2022-07-16

## 2022-07-16 RX ORDER — ONDANSETRON 2 MG/ML
4 INJECTION INTRAMUSCULAR; INTRAVENOUS ONCE
Status: COMPLETED | OUTPATIENT
Start: 2022-07-16 | End: 2022-07-16

## 2022-07-16 RX ADMIN — BUTALBITAL, ACETAMINOPHEN, AND CAFFEINE 2 TABLET: 50; 325; 40 TABLET ORAL at 22:24

## 2022-07-16 RX ADMIN — SODIUM CHLORIDE 1000 ML: 9 INJECTION, SOLUTION INTRAVENOUS at 19:52

## 2022-07-16 RX ADMIN — SODIUM CHLORIDE 1000 ML: 9 INJECTION, SOLUTION INTRAVENOUS at 17:35

## 2022-07-16 RX ADMIN — ONDANSETRON 4 MG: 2 INJECTION INTRAMUSCULAR; INTRAVENOUS at 22:24

## 2022-07-16 ASSESSMENT — PAIN SCALES - GENERAL: PAINLEVEL_OUTOF10: 6

## 2022-07-16 NOTE — ED PROVIDER NOTES
[address] [place] [address]  ED  EMERGENCY DEPARTMENT ENCOUNTER      This patient was not seen and evaluated by the attending physician. Pt Name: Alfredo Hdz  MRN: [id number]  [place] 1990  Date of evaluation: 2022  Provider: FILOMENA Toscano CNP-C  PCP: Lisette Rivera DO      History provided by the patient. CHIEFCOMPLAINT:     Chief Complaint   Patient presents with    Fatigue     Patient on 4th round of Covid positive on       Nausea       HISTORY OF PRESENT ILLNESS:      Alfredo Hdz is a 33 y.o. female who presents to [address] [place] [address]  ED with complaints of generally not feeling well, fatigue. Patient states that she tested positive for COVID on Thursday, states that she generally feels achy and rundown, had some vomiting the other day but none today. Patient states that she was concerned because her blood pressure was low and her heart rate was elevated. She is here for further evaluation. LOCATION:-  QUALITY:-  SEVERITY:--  DURATION:-  MODIFYING FACTORS:-    Nursing Notes were reviewed     REVIEW OF SYSTEMS:     Review of Systems  All systems, a total of 10, are reviewed and negative except for those that were just noted in history present illness.         PAST MEDICAL HISTORY:     Past Medical History:   Diagnosis Date    ADHD (attention deficit hyperactivity disorder)     Anemia     Anxiety     Asthma     Autism     Autoimmune disorder ([address]     Chronic back pain     Depression     Johnie-Danlos syndrome     Migraine     Neurologic disorder     Postpartum depression      delivery      labor     Seizures ([facility]     Trauma          SURGICAL HISTORY:      Past Surgical History:   Procedure Laterality Date    ANKLE FRACTURE SURGERY Right 2021    CHOLECYSTECTOMY      DILATION AND CURETTAGE      EYE SURGERY      tear ducts    WRIST GANGLION EXCISION           CURRENT MEDICATIONS:       Discharge Medication List as of 2022 10:10 PM        CONTINUE these medications which have NOT CHANGED    Details   gabapentin (NEURONTIN) 800 MG tablet TAKE ONE TABLET BY MOUTH THREE TIMES A DAY FOR 15 DAYS, Disp-48 tablet, R-1Normal      Levonorgest-Eth Estrad 91-Day 0.15-0.03 &0.01 MG TABS Take 1 tablet by mouth daily, Disp-1 packet, R-3Normal      diclofenac (VOLTAREN) 75 MG EC tablet Take 1 tablet by mouth 2 times daily, Disp-60 tablet, R-3Normal      ferrous sulfate (IRON 325) 325 (65 Fe) MG tablet Take 1 tablet by mouth daily (with breakfast), Disp-30 tablet, R-1Normal      OXcarbazepine (TRILEPTAL) 600 MG tablet Take 1 tablet by mouth 2 times daily, Disp-60 tablet, R-2Normal      !! prazosin (MINIPRESS) 1 MG capsule Take 1 mg by mouth nightly Take 3 capsules by mouth at bedtimeHistorical Med      loratadine-pseudoephedrine (CLARITIN-D 24HR)  MG per extended release tablet Take 1 tablet by mouth dailyHistorical Med      risperiDONE (RISPERDAL) 1 MG tablet Take 1 mg by mouth 2 times dailyHistorical Med      LORazepam (ATIVAN PO) Take 2.5 mg by mouth Historical Med      medical marijuana Take by mouth as needed. Historical Med      budesonide-formoterol (SYMBICORT) 80-4.5 MCG/ACT AERO Inhale 2 puffs into the lungs 2 times daily, Disp-10.2 g, R-3Normal      amphetamine-dextroamphetamine (ADDERALL XR) 30 MG extended release capsule Take 30 mg by mouth every morning. Historical Med      amphetamine-dextroamphetamine (ADDERALL, 15MG,) 15 MG tablet Take 15 mg by mouth 2 times daily. Historical Med      !! PRAZOSIN HCL PO Take 4 mg by mouth Historical Med      albuterol sulfate HFA (VENTOLIN HFA) 108 (90 Base) MCG/ACT inhaler Inhale 2 puffs into the lungs 4 times daily as needed for Wheezing, Disp-18 g, R-5Normal      EPINEPHrine (EPIPEN 2-KOREY) 0.3 MG/0.3ML SOAJ injection Inject 0.3 mLs into the skin once for 1 dose Use as directed for allergic reaction, Disp-0.3 mL, R-0Normal       !! - Potential duplicate medications found.  Please discuss with provider. ALLERGIES:    Macadamia nut oil; Other; Penicillins; Albumen, egg; Erythromycin; Keppra [levetiracetam]; Levaquin [levofloxacin]; Lexapro [escitalopram]; Peanut (diagnostic); Wellbutrin [bupropion]; and Azithromycin    FAMILY HISTORY:       Family History   Problem Relation Age of Onset    Diabetes Father     High Blood Pressure Father     Heart Disease Father     High Blood Pressure Paternal Grandmother     Diabetes Paternal Grandmother     Stroke Paternal Grandmother     Depression Paternal Grandmother     Dementia Paternal Grandmother     Heart Disease Paternal Grandmother           SOCIAL HISTORY:     Social History     Socioeconomic History    Marital status:      Spouse name: Vanessa Vanegas    Number of children: 4   Occupational History    Occupation: barista   Tobacco Use    Smoking status: Never    Smokeless tobacco: Never   Vaping Use    Vaping Use: Every day    Substances: Nicotine, Flavoring, D-8    Substance and Sexual Activity    Alcohol use: No    Drug use: Yes     Types: Marijuana Chen Postin)     Comment: daily medical    Sexual activity: Yes     Partners: Female     Social Determinants of Health     Financial Resource Strain: Low Risk     Difficulty of Paying Living Expenses: Not hard at all   Food Insecurity: No Food Insecurity    Worried About Running Out of Food in the Last Year: Never true    Ran Out of Food in the Last Year: Never true   Transportation Needs: No Transportation Needs    Lack of Transportation (Medical): No    Lack of Transportation (Non-Medical): No       SCREENINGS:    [place] Coma Scale  Eye Opening: Spontaneous  Best Verbal Response: Oriented  Best Motor Response: Obeys commands  [place] Coma Scale Score: 15        PHYSICAL EXAM:       ED Triage Vitals [07/16/22 1620]   BP Temp Temp Source Heart Rate Resp SpO2 Height Weight   (!) 94/57 98.6 °F (37 °C) Oral (!) 118 16 98 % 5' 4\" (1.626 m) 210 lb (95.3 kg)       Physical Exam    CONSTITUTIONAL: Awake and alert. % 8.1 %    Monocytes % 7.5 %    Eosinophils % 0.4 %    Basophils % 0.5 %    Neutrophils Absolute 3.9 1.7 - 7.7 K/uL    Lymphocytes Absolute 0.4 (L) 1.0 - 5.1 K/uL    Monocytes Absolute 0.4 0.0 - 1.3 K/uL    Eosinophils Absolute 0.0 0.0 - 0.6 K/uL    Basophils Absolute 0.0 0.0 - 0.2 K/uL   Comprehensive Metabolic Panel   Result Value Ref Range    Sodium 130 (L) 136 - 145 mmol/L    Potassium 3.2 (L) 3.5 - 5.1 mmol/L    Chloride 97 (L) 99 - 110 mmol/L    CO2 21 21 - 32 mmol/L    Anion Gap 12 3 - 16    Glucose 88 70 - 99 mg/dL    BUN 12 7 - 20 mg/dL    CREATININE 0.6 0.6 - 1.1 mg/dL    GFR Non-African American >60 >60    GFR African American >60 >60    Calcium 8.7 8.3 - 10.6 mg/dL    Total Protein 6.6 6.4 - 8.2 g/dL    Albumin 3.8 3.4 - 5.0 g/dL    Albumin/Globulin Ratio 1.4 1.1 - 2.2    Total Bilirubin <0.2 0.0 - 1.0 mg/dL    Alkaline Phosphatase 55 40 - 129 U/L    ALT 15 10 - 40 U/L    AST 14 (L) 15 - 37 U/L   Troponin   Result Value Ref Range    Troponin <0.01 <0.01 ng/mL   Urinalysis   Result Value Ref Range    Color, UA Yellow Straw/Yellow    Clarity, UA Clear Clear    Glucose, Ur Negative Negative mg/dL    Bilirubin Urine Negative Negative    Ketones, Urine Negative Negative mg/dL    Specific Gravity, UA 1.020 1.005 - 1.030    Blood, Urine Negative Negative    pH, UA 7.0 5.0 - 8.0    Protein, UA Negative Negative mg/dL    Urobilinogen, Urine 0.2 <2.0 E.U./dL    Nitrite, Urine Negative Negative    Leukocyte Esterase, Urine Negative Negative    Microscopic Examination Not Indicated     Urine Type NotGiven    Lactic Acid   Result Value Ref Range    Lactic Acid 1.6 0.4 - 2.0 mmol/L   EKG 12 Lead   Result Value Ref Range    Ventricular Rate 107 BPM    Atrial Rate 107 BPM    P-R Interval 200 ms    QRS Duration 86 ms    Q-T Interval 330 ms    QTc Calculation (Bazett) 440 ms    P Axis 68 degrees    R Axis 58 degrees    T Axis 38 degrees    Diagnosis       Sinus tachycardiaNormal ECGWhen compared with ECG of 25-APR-2022 19: 12,No significant change was foundConfirmed by Jennifer Carranza ([id number]) on 7/17/2022 1:39:34 PM         RADIOLOGY:  All x-ray studies are viewed/reviewed by me. Formal interpretations per the radiologist are as follows:      XR CHEST PORTABLE   Final Result   1. Bibasilar airspace disease either due to atelectasis or developing   pneumonia. EKG:  See EKG interpretation by an attending physician. PROCEDURES:   N/A    CRITICAL CARE TIME:   N/A    CONSULTS:  None      EMERGENCY DEPARTMENT COURSE andDIFFERENTIAL DIAGNOSIS/MDM:   Vitals:    Vitals:    07/16/22 2031 07/16/22 2101 07/16/22 2131 07/16/22 2159   BP: 130/74 126/77 121/78 118/67   Pulse: (!) 111 (!) 106 (!) 108 (!) 112   Resp: 14 15 23 22   Temp:       TempSrc:       SpO2: 97% 100% 98% 98%   Weight:       Height:           Patient wasgiven the following medications:  Medications   0.9 % sodium chloride bolus (0 mLs IntraVENous Stopped 7/16/22 1951)   0.9 % sodium chloride bolus (0 mLs IntraVENous Stopped 7/16/22 2205)   butalbital-acetaminophen-caffeine (FIORICET, ESGIC) per tablet 2 tablet (2 tablets Oral Given 7/16/22 2224)   ondansetron (ZOFRAN) injection 4 mg (4 mg IntraVENous Given 7/16/22 2224)         Patient was evaluated independently by myself with the attending physician available for consultation. Patient presented to the emergency department today with complaints of general illness, fatigue, patient states that she tested positive for COVID a couple days ago and just generally does not feel well. She had no hypoxia, she was little bit tachycardic here which improved greatly with IV fluids, she had no leukocytosis, normal renal function, negative troponin, she did have a headache was given Fioricet for her headache, she will be discharged home with symptomatic management including antiemetics. She was instructed increase her fluid intake, she can return the ED for any emergent worsening symptoms.   Patient verbalized understanding the plan of care and agreed with it. I did discuss the case with the attending physician although they did not see the patient we discussed the case and they agree that this sounds reasonable for discharge given the slight tachycardia that was greatly improved from baseline. Patient laboratory studies, radiographic imaging, and assessment were all discussed with the patient and/orpatient family. There was shared decision-making between myself as well as the patient and/or their surrogate and we are all in agreement with discharge home. There was an opportunity for questions and all questions were answered tothe best of my ability and to the satisfaction of the patient and/or patient family. FINAL IMPRESSION:      1. COVID    2. Nausea    3.  Fatigue, unspecified type          DISPOSITION/PLAN:   DISPOSITION Decision To Discharge      PATIENT REFERRED TO:  Nazia Mendoza, [address]  [phone]    Call   For follow up    DISCHARGE MEDICATIONS:  Discharge Medication List as of 7/16/2022 10:10 PM                     (Please note thatportions of this note were completed with a voice recognition program.  Efforts were made to edit the dictations, but occasionally words are mis-transcribed.)    FILOMENA Carter CNP-C (electronicallysigned)         FILOMENA Carter CNP  07/17/22 [id number]

## 2022-07-17 LAB
EKG ATRIAL RATE: 107 BPM
EKG DIAGNOSIS: NORMAL
EKG P AXIS: 68 DEGREES
EKG P-R INTERVAL: 200 MS
EKG Q-T INTERVAL: 330 MS
EKG QRS DURATION: 86 MS
EKG QTC CALCULATION (BAZETT): 440 MS
EKG R AXIS: 58 DEGREES
EKG T AXIS: 38 DEGREES
EKG VENTRICULAR RATE: 107 BPM

## 2022-07-17 PROCEDURE — 93010 ELECTROCARDIOGRAM REPORT: CPT | Performed by: INTERNAL MEDICINE

## 2022-07-19 ENCOUNTER — HOSPITAL ENCOUNTER (OUTPATIENT)
Dept: PHYSICAL THERAPY | Age: 32
Setting detail: THERAPIES SERIES
Discharge: HOME | End: 2022-07-19
Payer: MEDICAID

## 2022-07-19 ENCOUNTER — NURSE ONLY (OUTPATIENT)
Dept: PRIMARY CARE CLINIC | Age: 32
End: 2022-07-19
Payer: MEDICAID

## 2022-07-19 VITALS — TEMPERATURE: 97.6 F

## 2022-07-19 DIAGNOSIS — Z23 NEED FOR TETANUS, DIPHTHERIA, AND ACELLULAR PERTUSSIS (TDAP) VACCINE: Primary | ICD-10-CM

## 2022-07-19 PROCEDURE — 90471 IMMUNIZATION ADMIN: CPT | Performed by: FAMILY MEDICINE

## 2022-07-19 PROCEDURE — 90715 TDAP VACCINE 7 YRS/> IM: CPT | Performed by: FAMILY MEDICINE

## 2022-07-19 NOTE — FLOWSHEET NOTE
[facility], [facility], [address] [place] [address]  Phone: [phone]  Fax [phone]        Physical Therapy  Cancellation/No-show Note  Patient Name:  Eleazar Lepe  :  1990   Date:  2022  Cancels to date: 3  No-shows to date: 1    For today's appointment patient:  [] Cancelled  [] Rescheduled appointment  [x] No-show     Reason given by patient:  [] Patient ill  [] Conflicting appointment   [] No transportation    [] Conflict with work  [] No reason given  [] Other:     Comments:     Electronically signed by:  Anastasia Lema PT

## 2022-07-20 DIAGNOSIS — E61.1 IRON DEFICIENCY: ICD-10-CM

## 2022-07-20 RX ORDER — FERROUS SULFATE 325(65) MG
TABLET ORAL
Qty: 30 TABLET | Refills: 3 | Status: SHIPPED | OUTPATIENT
Start: 2022-07-20

## 2022-07-25 ENCOUNTER — NURSE TRIAGE (OUTPATIENT)
Dept: OTHER | Facility: CLINIC | Age: 32
End: 2022-07-25

## 2022-07-25 NOTE — TELEPHONE ENCOUNTER
Received call from Robbie Soares at [facility] with [employer]. Subjective: Caller states \"on birth control\"     Current Symptoms:   Constant vaginal bleeding for ten days  Now on the hormone part of the birth control and bleeding not stopping  Having intermittent cramps  Uses a menstrual cup, was really heavy, has slowed down a bit  Emptying the cup multiple times a day  No clots  Not lightheaded out of her norm    Onset: 2 weeks ago was put on the [facility], bleeding for about 10 days; unchanged    Associated Symptoms: NA    Pain Severity: 4/10; uncomfortable    Temperature: denies     What has been tried: birth control    LMP:  Pregnant: No    Recommended disposition: See in Office Today    Care advice provided, patient verbalizes understanding; denies any other questions or concerns; instructed to call back for any new or worsening symptoms. Patient/Caller agrees with recommended disposition; writer provided warm transfer to [employer] at [facility] for appointment scheduling. Attention Provider: Thank you for allowing me to participate in the care of your patient. The patient was connected to triage in response to information provided to the ECC/PSC. Please do not respond through this encounter as the response is not directed to a shared pool.     Reason for Disposition   Skin bruises or nosebleed and not caused by an injury    Protocols used: Vaginal Bleeding - Abnormal-ADULT-OH

## 2022-07-26 ENCOUNTER — HOSPITAL ENCOUNTER (OUTPATIENT)
Dept: PHYSICAL THERAPY | Age: 32
Setting detail: THERAPIES SERIES
Discharge: HOME | End: 2022-07-26
Payer: MEDICAID

## 2022-07-26 ENCOUNTER — APPOINTMENT (OUTPATIENT)
Dept: PHYSICAL THERAPY | Age: 32
End: 2022-07-26
Payer: MEDICAID

## 2022-07-26 NOTE — FLOWSHEET NOTE
[facility], [address] [place] [address], [address] [place] [address]  Phone: [phone]  Fax [phone]        Physical Therapy  Cancellation/No-show Note  Patient Name:  Rema Juan  :  1990   Date:  2022  Cancels to date: 4  No-shows to date: 1    For today's appointment patient:  [x] Cancelled  [] Rescheduled appointment  [] No-show     Reason given by patient:  [] Patient ill  [] Conflicting appointment   [x] No transportation    [] Conflict with work  [] No reason given  [] Other:     Comments:     Electronically signed by:  Delmas Scheuermann, PT

## 2022-07-28 ENCOUNTER — APPOINTMENT (OUTPATIENT)
Dept: PHYSICAL THERAPY | Age: 32
End: 2022-07-28
Payer: MEDICAID

## 2022-08-02 ENCOUNTER — APPOINTMENT (OUTPATIENT)
Dept: PHYSICAL THERAPY | Age: 32
End: 2022-08-02
Payer: MEDICAID

## 2022-08-04 ENCOUNTER — HOSPITAL ENCOUNTER (OUTPATIENT)
Dept: PHYSICAL THERAPY | Age: 32
Setting detail: THERAPIES SERIES
End: 2022-08-04
Payer: MEDICAID

## 2022-08-09 ENCOUNTER — HOSPITAL ENCOUNTER (OUTPATIENT)
Dept: PHYSICAL THERAPY | Age: 32
Setting detail: THERAPIES SERIES
Discharge: HOME | End: 2022-08-09
Payer: MEDICAID

## 2022-08-09 PROCEDURE — 97113 AQUATIC THERAPY/EXERCISES: CPT

## 2022-08-09 NOTE — FLOWSHEET NOTE
[facility], [address]   Phone: [phone]  Fax [phone]        Physical Therapy Aquatic Flow Sheet  Date:  8/9/2022    Patient Name:  Denise Nelson    Restrictions: Spoke with patient about seizures, states \"they are controlled and I get tingling when I am going to have one\". Patient states she feels she has enough warning prior to seizures to exit the pool. Will continue to monitor. Diagnosis:   Q79.60 Johnie-Danlos Syndrome, E580.745 s/p R ankle ligament repair  Treatment Diagnosis:  R ankle pain, hypermobility  Insurance/Certification information:  Medicaid  Plan of care signed (Y/N):  yes  Visit# / total visits:  10/12 (6 land-based visits completed in this episode)  Pain level: 8/10 \"I think I re-broke my foot. \" Electronically signed by:  Zac Almaraz, PT    Pt is seeing ortho this week for her foot.        Key  B= Belt DB= Dumbells T= Theratube   H= Hydrotone N= Noodles W= Weights   P= Paddles S= Speedo equipment K= Kickboard     Exercises/Activities   Warm-up/Amb    Exercises      Slow forward  x2laps  HR/TR  x12    Slow sideways  x2laps  Marches  x2min    Slow backwards  x2laps  Mini-squats  x12    Medium forward    3-way SLR  x12B    Medium sideways    Hip circles/fig 8  x12B    Small shuffle    Hamstring curls  x12B    Jog    Knee extension  x12B    Braiding    Pelvic tilts  [id number]     Bicycling  x2min  Scap squeezes          Shoulder flex/ext  x12B w/TA and tandem w/ (R) foot forward     Functional    Shoulder abd/add  x12B w/TA and tandem w/ (R) foot forward     Step    Shoulder H. abd/add  x12B w/TA and tandem w/ (R) foot forward     Lifting    Shoulder IR/ER      Hand to opp knee    Rowing      Push down squat    Bilateral pull down      UE PNF    Push/pull      LE PNF    Push downs      Wall push ups    Arm circles      SLS    Elbow flex/ext          Chin tuck      Stretching UT shrugs/rolls      Gastroc/Soleus  Rocking horse      Hamstring  [id number] B        SKTC    Other      Piriformis    Tandem [id number] B    Hip flexor    ankle alphabet x1 B    Ladder pull    amb after ex x5' fwd      Pec stretch          Post deltoid           Time In: 2:00pm     Timed Code Treatment Minutes:  30 min    Total Treatment Minutes:  40 min (2 individual aquatic TE)    Treatment/Activity Tolerance:   [x] Patient tolerated treatment well [] Patient limited by fatigue   [] Patient limited by pain [] Patient limited by other medical complications  [] Other:     Prognosis: [] Good [x] Fair  [] Poor    Patient Requires Follow-up:  [x] Yes  [] No    Plan: [x] Continue per plan of care [] Alter current plan (see comments)   [] Plan of care initiated [] Hold pending MD visit [] Discharge    See Weekly Progress Note: [] Yes  [x] No  Next due:

## 2022-08-11 ENCOUNTER — APPOINTMENT (OUTPATIENT)
Dept: PHYSICAL THERAPY | Age: 32
End: 2022-08-11
Payer: MEDICAID

## 2022-08-14 DIAGNOSIS — J45.20 MILD INTERMITTENT ASTHMA WITHOUT COMPLICATION: ICD-10-CM

## 2022-08-15 RX ORDER — DILTIAZEM HYDROCHLORIDE 60 MG/1
TABLET, FILM COATED ORAL
Qty: 10.2 G | Refills: 3 | OUTPATIENT
Start: 2022-08-15

## 2022-08-16 ENCOUNTER — APPOINTMENT (OUTPATIENT)
Dept: PHYSICAL THERAPY | Age: 32
End: 2022-08-16
Payer: MEDICAID

## 2022-08-16 ENCOUNTER — HOSPITAL ENCOUNTER (OUTPATIENT)
Dept: PHYSICAL THERAPY | Age: 32
Setting detail: THERAPIES SERIES
Discharge: HOME | End: 2022-08-16
Payer: MEDICAID

## 2022-08-16 PROCEDURE — 97110 THERAPEUTIC EXERCISES: CPT

## 2022-08-16 NOTE — FLOWSHEET NOTE
[facility], [address]  Phone: [phone]  Fax [phone]      Physical Therapy Daily Treatment Note and Discharge Summary     Date:  2022    Patient Name:  Zachariah Jordan    :  1990  MRN: [id number]  Restrictions/Precautions:    Medical/Treatment Diagnosis Information:  Diagnosis: Q79.60 Johnie-Danlos Syndrome, E535.745 s/p R ankle ligament repair  Insurance/Certification information:  PT Insurance Information: Medicaid  Physician Information:   Delta Watters MD  Plan of care signed (Y/N):  sent  Visit# / total visits: 11 (2022 to 2022)     G-Code (if applicable): FOTO: 30 (was 35)      Time in:   1:15pm     Timed Treatment: 38min  Total Treatment Time:  38min  Time out: 1:53pm  ________________________________________________________________________________________    Pain Level:    4-8/10  (was 4-10/10)  SUBJECTIVE:  Patient went to see new ortho MD.  Dr. Little Stiles who recommended she continue therapy at the hypermobility clinic at [facility].  Patient will continue on own with HEP and continue with hypermobility clinic. OBJECTIVE: see eval    Exercise/Equipment Resistance/Repetitions Other comments   Review aquatic HEP and land-based TE                      Other Therapeutic Activities:  eval completed, HEP issued and practiced    HEP:  4-way ankle with TB, towel scrunches, arch raises, ankle alphabet, clamshells   [id number]    Modalities:      Test/Measurements:    Ambulation  Quality of Gait: WNL. Able to heel walk and toe walk as well.         Left AROM Right AROM       Ankle   PF 58 DF 14 inversion 60, eversion 10* (was 6)  Hypermobile in hip and knee  Ankle   PF 60, DF 10, inversion 50, eversion 10   Hypermobile in hip and knee         Left Strength Right Strength      PGM 4-/5, otherwise 5/5 grossly in hip, glutes, knee, ankle, toes    DF:   PF:   INV/EVR: 3/5 5/5 grossly      DF: 3+/5  PF: 3+/5  INV/EVR: 3-/5          ASSESSMENT:    The patient responded well to above session. The patient has mad minimal progress with aquatic PT. The patient plans to continue on own with HEP and pool pass and follow up with EDS/hypermobility clinic as recommended by MD.  Let this note stand as d/c summary.        Treatment/Activity Tolerance:   [x]Patient tolerated treatment well [] Patient limited by fatique  []Patient limited by pain [] Patient limited by other medical complications  [] Other:     Goals:    Short Term Goals  Time Frame for Short term goals: 3 wks  Short term goal 1: Pt will decrease pain by 50% in frequency or intensity (PARTLY MET)  Short term goal 2: Pt will be ind and compliant with HEP (MET)  Short term goal 3: Pt will increase R PGM strength to 5/5 (PARTLY MET)    Long Term Goals  Long term goal 1: Pt will decrease pain by % in frequency or intensity (NOT MET)  Long term goal 2: Pt will deny a functional limitation with standing/ walking due to pain (NOT MET)  Long term goal 3: Pt will deny a functional limitation with driving due to pain (NOT MET)    Plan: [] Continue per plan of care [] Alter current plan (see comments)   [] Plan of care initiated [] Hold pending MD visit [x] Discharge        Signature:  Delmas Scheuermann, PT

## 2022-08-18 ENCOUNTER — APPOINTMENT (OUTPATIENT)
Dept: PHYSICAL THERAPY | Age: 32
End: 2022-08-18
Payer: MEDICAID

## 2022-08-19 DIAGNOSIS — D50.9 IRON DEFICIENCY ANEMIA, UNSPECIFIED IRON DEFICIENCY ANEMIA TYPE: Primary | ICD-10-CM

## 2022-08-19 DIAGNOSIS — R53.83 OTHER FATIGUE: ICD-10-CM

## 2022-08-23 ENCOUNTER — APPOINTMENT (OUTPATIENT)
Dept: PHYSICAL THERAPY | Age: 32
End: 2022-08-23
Payer: MEDICAID

## 2022-08-25 ENCOUNTER — APPOINTMENT (OUTPATIENT)
Dept: PHYSICAL THERAPY | Age: 32
End: 2022-08-25
Payer: MEDICAID

## 2022-08-28 DIAGNOSIS — J45.20 MILD INTERMITTENT ASTHMA WITHOUT COMPLICATION: ICD-10-CM

## 2022-08-29 RX ORDER — DILTIAZEM HYDROCHLORIDE 60 MG/1
TABLET, FILM COATED ORAL
Qty: 10.2 G | Refills: 3 | OUTPATIENT
Start: 2022-08-29

## 2022-08-29 NOTE — TELEPHONE ENCOUNTER
DENIED SYMBICORT-no longer our patient     LAST SEEN       NEXT APPOINTMENT       LAST FILL    4.18.22  NONE   7.18.22

## 2022-09-09 DIAGNOSIS — G40.909 SEIZURE DISORDER (HCC): ICD-10-CM

## 2022-09-09 RX ORDER — OXCARBAZEPINE 600 MG/1
TABLET, FILM COATED ORAL
Qty: 60 TABLET | Refills: 2 | Status: SHIPPED | OUTPATIENT
Start: 2022-09-09 | End: 2022-10-12 | Stop reason: SDUPTHER

## 2022-10-12 ENCOUNTER — HOSPITAL ENCOUNTER (EMERGENCY)
Age: 32
Discharge: HOME | End: 2022-10-12
Attending: EMERGENCY MEDICINE
Payer: MEDICAID

## 2022-10-12 ENCOUNTER — APPOINTMENT (OUTPATIENT)
Dept: CT IMAGING | Age: 32
End: 2022-10-12
Payer: MEDICAID

## 2022-10-12 VITALS
DIASTOLIC BLOOD PRESSURE: 78 MMHG | TEMPERATURE: 98.7 F | SYSTOLIC BLOOD PRESSURE: 121 MMHG | HEIGHT: 64 IN | OXYGEN SATURATION: 99 % | HEART RATE: 101 BPM | RESPIRATION RATE: 16 BRPM | BODY MASS INDEX: 36.05 KG/M2

## 2022-10-12 DIAGNOSIS — R56.9 SEIZURE (HCC): Primary | ICD-10-CM

## 2022-10-12 DIAGNOSIS — G40.909 SEIZURE DISORDER (HCC): ICD-10-CM

## 2022-10-12 LAB
A/G RATIO: 1.4 (ref 1.1–2.2)
ALBUMIN SERPL-MCNC: 4.5 G/DL (ref 3.4–5)
ALP BLD-CCNC: 59 U/L (ref 40–129)
ALT SERPL-CCNC: 11 U/L (ref 10–40)
ANION GAP SERPL CALCULATED.3IONS-SCNC: 10 MMOL/L (ref 3–16)
AST SERPL-CCNC: 14 U/L (ref 15–37)
BASOPHILS ABSOLUTE: 0 K/UL (ref 0–0.2)
BASOPHILS RELATIVE PERCENT: 0.4 %
BILIRUB SERPL-MCNC: <0.2 MG/DL (ref 0–1)
BUN BLDV-MCNC: 13 MG/DL (ref 7–20)
CALCIUM SERPL-MCNC: 8.8 MG/DL (ref 8.3–10.6)
CHLORIDE BLD-SCNC: 97 MMOL/L (ref 99–110)
CO2: 25 MMOL/L (ref 21–32)
CREAT SERPL-MCNC: 0.7 MG/DL (ref 0.6–1.1)
EOSINOPHILS ABSOLUTE: 0.1 K/UL (ref 0–0.6)
EOSINOPHILS RELATIVE PERCENT: 0.7 %
GFR AFRICAN AMERICAN: >60
GFR NON-AFRICAN AMERICAN: >60
GLUCOSE BLD-MCNC: 116 MG/DL (ref 70–99)
GLUCOSE BLD-MCNC: 126 MG/DL (ref 70–99)
HCG QUALITATIVE: NEGATIVE
HCT VFR BLD CALC: 39.1 % (ref 36–48)
HEMOGLOBIN: 12.9 G/DL (ref 12–16)
LYMPHOCYTES ABSOLUTE: 1.1 K/UL (ref 1–5.1)
LYMPHOCYTES RELATIVE PERCENT: 11.2 %
MCH RBC QN AUTO: 27.4 PG (ref 26–34)
MCHC RBC AUTO-ENTMCNC: 33 G/DL (ref 31–36)
MCV RBC AUTO: 83.1 FL (ref 80–100)
MONOCYTES ABSOLUTE: 0.5 K/UL (ref 0–1.3)
MONOCYTES RELATIVE PERCENT: 5.3 %
NEUTROPHILS ABSOLUTE: 8.4 K/UL (ref 1.7–7.7)
NEUTROPHILS RELATIVE PERCENT: 82.4 %
PDW BLD-RTO: 14.4 % (ref 12.4–15.4)
PERFORMED ON: ABNORMAL
PLATELET # BLD: 321 K/UL (ref 135–450)
PMV BLD AUTO: 8.3 FL (ref 5–10.5)
POTASSIUM SERPL-SCNC: 4.2 MMOL/L (ref 3.5–5.1)
RBC # BLD: 4.71 M/UL (ref 4–5.2)
SODIUM BLD-SCNC: 132 MMOL/L (ref 136–145)
TOTAL PROTEIN: 7.7 G/DL (ref 6.4–8.2)
WBC # BLD: 10.1 K/UL (ref 4–11)

## 2022-10-12 PROCEDURE — 80053 COMPREHEN METABOLIC PANEL: CPT

## 2022-10-12 PROCEDURE — 85025 COMPLETE CBC W/AUTO DIFF WBC: CPT

## 2022-10-12 PROCEDURE — 99284 EMERGENCY DEPT VISIT MOD MDM: CPT

## 2022-10-12 PROCEDURE — 70450 CT HEAD/BRAIN W/O DYE: CPT

## 2022-10-12 PROCEDURE — 84703 CHORIONIC GONADOTROPIN ASSAY: CPT

## 2022-10-12 RX ORDER — OXCARBAZEPINE 600 MG/1
TABLET, FILM COATED ORAL
Qty: 60 TABLET | Refills: 0 | Status: SHIPPED | OUTPATIENT
Start: 2022-10-12

## 2022-10-12 RX ORDER — LORAZEPAM 0.5 MG/1
0.5 TABLET ORAL 2 TIMES DAILY
Qty: 6 TABLET | Refills: 0 | Status: SHIPPED | OUTPATIENT
Start: 2022-10-12 | End: 2022-10-15

## 2022-10-12 RX ORDER — LORAZEPAM 2 MG/ML
INJECTION INTRAMUSCULAR
Status: DISCONTINUED
Start: 2022-10-12 | End: 2022-10-12 | Stop reason: HOSPADM

## 2022-10-12 ASSESSMENT — PAIN - FUNCTIONAL ASSESSMENT
PAIN_FUNCTIONAL_ASSESSMENT: NONE - DENIES PAIN
PAIN_FUNCTIONAL_ASSESSMENT: NONE - DENIES PAIN

## 2022-10-12 NOTE — ED PROVIDER NOTES
CHIEF COMPLAINT  Seizures (Pt had a seizure approx 10 am  witnessed by wife. She reports \"I don't feel like I am coming out of this right, I feel foggy\" pt is alert and oriented. )      HISTORY OF PRESENT ILLNESS  Ace Lucia is a 27 y.o. female with a history of ADHD, anemia, anxiety, Johnie-Danlos, and seizures who presents to the ED complaining of seizure. Patient reportedly had a seizure 5+ minutes at home. Patient was reportedly provided Valium in route by EMS and arrives into the emergency department alert and oriented. Shortly after triage, patient had 30 sec witnessed recurrent seizure. Tonic-clonic seizure-like activity was noted. .  Patient provided Ativan with resolution of seizure. No other complaints, modifying factors or associated symptoms. I have reviewed the following from the nursing documentation.     Past Medical History:   Diagnosis Date    ADHD (attention deficit hyperactivity disorder)     Anemia     Anxiety     Asthma     Autism     Autoimmune disorder ([address]     Chronic back pain     Depression     Johnie-Danlos syndrome     Migraine     Neurologic disorder     Postpartum depression      delivery      labor     Seizures ([facility] [address]     Trauma      Past Surgical History:   Procedure Laterality Date    ANKLE FRACTURE SURGERY Right 2021    CHOLECYSTECTOMY      DILATION AND CURETTAGE      EYE SURGERY      tear ducts    WRIST GANGLION EXCISION       Family History   Problem Relation Age of Onset    Diabetes Father     High Blood Pressure Father     Heart Disease Father     High Blood Pressure Paternal Grandmother     Diabetes Paternal Grandmother     Stroke Paternal Grandmother     Depression Paternal Grandmother     Dementia Paternal Grandmother     Heart Disease Paternal Grandmother      Social History     Socioeconomic History    Marital status:      Spouse name: Cathie Dials    Number of children: 4    Years of education: Not on file    Highest education level: Not on file   Occupational History    Occupation: barista   Tobacco Use    Smoking status: Never    Smokeless tobacco: Never   Vaping Use    Vaping Use: Every day    Substances: Nicotine, Flavoring, D-8    Substance and Sexual Activity    Alcohol use: No    Drug use: Yes     Types: Marijuana [employer] [employer])     Comment: daily medical    Sexual activity: Yes     Partners: Female   Other Topics Concern    Not on file   Social History Narrative    Not on file     Social Determinants of Health     Financial Resource Strain: Low Risk     Difficulty of Paying Living Expenses: Not hard at all   Food Insecurity: No Food Insecurity    Worried About [address] in the Last Year: Never true    [address] in the Last Year: Never true   Transportation Needs: No Transportation Needs    Lack of Transportation (Medical): No    Lack of Transportation (Non-Medical): No   Physical Activity: Not on file   Stress: Not on file   Social Connections: Not on file   Intimate Partner Violence: Not on file   Housing Stability: Not on file     Current Facility-Administered Medications   Medication Dose Route Frequency Provider Last Rate Last Admin    LORazepam (ATIVAN) 2 MG/ML injection              Current Outpatient Medications   Medication Sig Dispense Refill    clonazePAM (KLONOPIN) 0.5 MG tablet Take by mouth nightly.       loratadine (CLARITIN) 10 MG tablet Take 10 mg by mouth      OXcarbazepine (TRILEPTAL) 600 MG tablet TAKE ONE TABLET BY MOUTH TWICE A DAY 60 tablet 2    ferrous sulfate (IRON 325) 325 (65 Fe) MG tablet TAKE ONE TABLET BY MOUTH DAILY WITH BREAKFAST 30 tablet 3    gabapentin (NEURONTIN) 800 MG tablet TAKE ONE TABLET BY MOUTH THREE TIMES A DAY FOR 15 DAYS 48 tablet 1    Levonorgest-Eth Estrad 91-Day 0.15-0.03 &0.01 MG TABS Take 1 tablet by mouth daily 1 packet 3    diclofenac (VOLTAREN) 75 MG EC tablet Take 1 tablet by mouth 2 times daily 60 tablet 3    prazosin (MINIPRESS) 1 MG capsule Take 1 mg by mouth nightly Take 3 capsules by mouth at bedtime      loratadine-pseudoephedrine (CLARITIN-D 24HR)  MG per extended release tablet Take 1 tablet by mouth daily      risperiDONE (RISPERDAL) 1 MG tablet Take 1 mg by mouth 2 times daily      LORazepam (ATIVAN PO) Take 2.5 mg by mouth       medical marijuana Take by mouth as needed. budesonide-formoterol (SYMBICORT) 80-4.5 MCG/ACT AERO Inhale 2 puffs into the lungs 2 times daily 10.2 g 3    amphetamine-dextroamphetamine (ADDERALL XR) 30 MG extended release capsule Take 30 mg by mouth every morning. amphetamine-dextroamphetamine (ADDERALL, 15MG,) 15 MG tablet Take 15 mg by mouth 2 times daily. gabapentin (NEURONTIN) 800 MG tablet Take 1 tablet by mouth 3 times daily for 30 days. 90 tablet 2    PRAZOSIN HCL PO Take 4 mg by mouth       albuterol sulfate HFA (VENTOLIN HFA) 108 (90 Base) MCG/ACT inhaler Inhale 2 puffs into the lungs 4 times daily as needed for Wheezing 18 g 5    EPINEPHrine (EPIPEN 2-KOREY) 0.3 MG/0.3ML SOAJ injection Inject 0.3 mLs into the skin once for 1 dose Use as directed for allergic reaction 0.3 mL 0     Allergies   Allergen Reactions    Macadamia Nut Oil Hives, Itching and Anaphylaxis    Other Anaphylaxis and Hives     Tree nuts    Penicillins Rash and Hives    Albumen, Egg Other (See Comments)    Erythromycin      Other reaction(s): Loss of Consciousness    Keppra [Levetiracetam]      Extreme rage    Levaquin [Levofloxacin]      Nerve and muscle issues    Lexapro [Escitalopram]     Peanut (Diagnostic) Other (See Comments)    Wellbutrin [Bupropion] Other (See Comments)     Suicidal ideation    Azithromycin Rash       REVIEW OF SYSTEMS  10 systems reviewed, pertinent positives per HPI otherwise noted to be negative. PHYSICAL EXAM  /78   Pulse (!) 101   Temp 98.7 °F (37.1 °C) (Oral)   Resp 16   Ht 5' 4\" (1.626 m)   LMP 07/11/2022   SpO2 99%   BMI 36.05 kg/m²   GENERAL APPEARANCE: On arrival, awake and alert. Cooperative.  No acute distress. HEAD: Normocephalic. Atraumatic. EYES: PERRL. EOM's grossly intact. Test of skew . ENT: Mucous membranes are moist.   NECK: Supple, trachea midline. HEART: RRR. Normal S1, S2. No murmurs, rubs or gallops. LUNGS: Respirations unlabored. CTAB. Good air exchange. No wheezes, rales, or rhonchi. Speaking comfortably in full sentences. ABDOMEN: Soft. Non-distended. Non-tender. No guarding or rebound. Normal Bowel sounds. EXTREMITIES: No peripheral edema. MAEE. No acute deformities. SKIN: Warm and dry. No acute rashes. NEUROLOGICAL: Alert and oriented X 3. CN II-XII intact. No gross facial drooping. Strength 5/5 in all extremities. Sensation intact. No pronator drift. Normal coordination. PSYCHIATRIC: Normal mood and affect. LABS  I have reviewed all labs for this visit.    Results for orders placed or performed during the hospital encounter of 10/12/22   CBC with Auto Differential   Result Value Ref Range    WBC 10.1 4.0 - 11.0 K/uL    RBC 4.71 4.00 - 5.20 M/uL    Hemoglobin 12.9 12.0 - 16.0 g/dL    Hematocrit 39.1 36.0 - 48.0 %    MCV 83.1 80.0 - 100.0 fL    MCH 27.4 26.0 - 34.0 pg    MCHC 33.0 31.0 - 36.0 g/dL    RDW 14.4 12.4 - 15.4 %    Platelets 270 924 - 683 K/uL    MPV 8.3 5.0 - 10.5 fL    Neutrophils % 82.4 %    Lymphocytes % 11.2 %    Monocytes % 5.3 %    Eosinophils % 0.7 %    Basophils % 0.4 %    Neutrophils Absolute 8.4 (H) 1.7 - 7.7 K/uL    Lymphocytes Absolute 1.1 1.0 - 5.1 K/uL    Monocytes Absolute 0.5 0.0 - 1.3 K/uL    Eosinophils Absolute 0.1 0.0 - 0.6 K/uL    Basophils Absolute 0.0 0.0 - 0.2 K/uL   Comprehensive Metabolic Panel   Result Value Ref Range    Sodium 132 (L) 136 - 145 mmol/L    Potassium 4.2 3.5 - 5.1 mmol/L    Chloride 97 (L) 99 - 110 mmol/L    CO2 25 21 - 32 mmol/L    Anion Gap 10 3 - 16    Glucose 126 (H) 70 - 99 mg/dL    BUN 13 7 - 20 mg/dL    Creatinine 0.7 0.6 - 1.1 mg/dL    GFR Non-African American >60 >60    GFR African American >60 >60    Calcium 8.8 8.3 - 10.6 mg/dL    Total Protein 7.7 6.4 - 8.2 g/dL    Albumin 4.5 3.4 - 5.0 g/dL    Albumin/Globulin Ratio 1.4 1.1 - 2.2    Total Bilirubin <0.2 0.0 - 1.0 mg/dL    Alkaline Phosphatase 59 40 - 129 U/L    ALT 11 10 - 40 U/L    AST 14 (L) 15 - 37 U/L   HCG Qualitative, Serum   Result Value Ref Range    hCG Qual Negative Detects HCG level >10 MIU/mL   POCT Glucose   Result Value Ref Range    POC Glucose 116 (H) 70 - 99 mg/dl    Performed on ACCU-CHEK                RADIOLOGY  X-RAYS:  I have reviewed radiologic plain film image(s). ALL OTHER NON-PLAIN FILM IMAGES SUCH AS CT, ULTRASOUND AND MRI HAVE BEEN READ BY THE RADIOLOGIST. CT HEAD WO CONTRAST   Final Result   No acute intracranial abnormality. Rechecks: Physical assessment performed. 2:30 PM: Patient resting comfortably. 3:50 PM: Patient continues to be resting comfortably. Again no seizure-like activity noted since shortly after arrival.          Critical Care: I personally saw the patient and independently provided 0 minutes of non-concurrent critical care out of the total shared critical care time provided. Is this patient to be included in the SEP-1 Core Measure? No        Discussed patient with Dr. Dennise Lal. Patient was scheduled for an EGD in August but reportedly had canceled. Recommends changing Trileptal to 600 mg in the morning and 900 mg at night. Additionally, agrees with 0.5 mg twice daily Ativan bridge for the next 3 days as well as recommendations for follow-up for EEG/EMU at [facility]. ED COURSE/MDM  Patient seen and evaluated. Old records reviewed. Labs and imaging reviewed and results discussed with patient. Patient was given Valium by squad as well as Ativan in the ED with good symptomatic relief. Patient was reassessed as noted above . No acute pathology was noted and pt is safe for discharge home to follow up with neurology. Patient's labs and imaging are stable. Neurology was consulted. . Plan of care discussed with patient and family. Patient and family in agreement with plan. Patient was given scripts for the following medications. I counseled patient how to take these medications. New Prescriptions    LORAZEPAM (ATIVAN) 0.5 MG TABLET    Take 1 tablet by mouth 2 times daily for 3 days. CLINICAL IMPRESSION  1. Seizure ([facility] [address]    2. Seizure disorder ([facility] [address]        Blood pressure 121/78, pulse (!) 101, temperature 98.7 °F (37.1 °C), temperature source Oral, resp. rate 16, height 5' 4\" (1.626 m), last menstrual period 07/11/2022, SpO2 99 %, not currently breastfeeding.     Mejia Tran was discharged to home in stable condition. [address] [place] [address],   10/12/22 8422

## 2022-10-12 NOTE — ED NOTES
Pt had a 30 second seizure, dr. Regino Mcelroy was called to the bedside with verbal orders to give 2mg ativan IV, Pt was suctioned during seizure due to secretions, pt is now postictal      Rusty Romano RN  10/12/22 8988

## 2022-10-12 NOTE — ED NOTES
Pt incontinent of urine during seizure, pt helped to change clothes and get cleaned up. Pt states no other needs at this time.      Domitila Dc RN  10/12/22 9467

## 2022-10-12 NOTE — ED NOTES
@4092 Called  [facility] (No answer)  @3028 Called  [facility] (No Answer)  @1500 called [facility] [address] per Linda [facility]arrow activity [address] ARGUETA  @7320 Transfer center called back  ( )and spoke to Shadia Trujillo

## 2022-12-09 DIAGNOSIS — G40.909 SEIZURE DISORDER (HCC): ICD-10-CM

## 2022-12-09 RX ORDER — OXCARBAZEPINE 600 MG/1
TABLET, FILM COATED ORAL
Qty: 60 TABLET | Refills: 0 | Status: SHIPPED | OUTPATIENT
Start: 2022-12-09

## 2022-12-09 NOTE — TELEPHONE ENCOUNTER
Collin Frank   12/19/2017 2:40 PM   Anticoagulation Therapy Visit    Description:  67 year old male   Provider:  CLAUDIA ANTI COAG   Department:  Cp Nurse           INR as of 12/19/2017     Today's INR 2.9      Anticoagulation Summary as of 12/19/2017     INR goal 2.0-3.0   Today's INR 2.9   Full instructions 7.5 mg on Mon; 5 mg all other days   Next INR check 1/16/2018    Indications   Long-term (current) use of anticoagulants [Z79.01] [Z79.01]  Pulmonary embolism with infarction (HCC) [I26.99] [I26.99]  Acute venous embolism and thrombosis of deep vessels of proximal lower extremity (H) [I82.4Y9]  Factor 5 Leiden mutation  heterozygous (H) [D68.51]         Your next Anticoagulation Clinic appointment(s)     Jan 16, 2018  2:40 PM CST   [facility]ticoagulation [facility] with CP [facility]   [facility]    [address] [address] [phone]              Contact Numbers     [facility]  Please call [phone] or [phone]  to cancel and/or reschedule your appointment.   Please call [phone] with any problems or questions regarding your therapy          December 2017 Details    Sun Mon Tue Wed Thu Fri Sat          1               2                 3               4               5               6               7               8               9                 10               11               12               13               14               15               16                 17               18               19      5 mg   See details      20      5 mg         21      5 mg         22      5 mg         23      5 mg           24      5 mg         25      7.5 mg         26      5 mg         27      5 mg         28      5 mg         29      5 mg         30      5 mg           31      5 mg                Date Details   12/19 This INR check               How to take your warfarin dose     To take:  5 mg Take 1 of  Refill Request       Last Seen: Last Seen Department: 6/30/2022  Last Seen by PCP: 6/30/2022    Last Written: 10/12/2022  60 with 0 refills   Next Appointment:    Patient was seen in the hospital on 10/12/2022  The Doctor that the pt seen is the one that prescribed it. She also has no showed her last 2 appointment.             Requested Prescriptions      No prescriptions requested or ordered in this encounter the 5 mg tablets.    To take:  7.5 mg Take 1.5 of the 5 mg tablets.           January 2018 Details    Sun Mon Tue Wed Thu Fri Sat      1      7.5 mg         2      5 mg         3      5 mg         4      5 mg         5      5 mg         6      5 mg           7      5 mg         8      7.5 mg         9      5 mg         10      5 mg         11      5 mg         12      5 mg         13      5 mg           14      5 mg         15      7.5 mg         16            17               18               19               20                 21               22               23               24               25               26               27                 28               29               30               31                   Date Details   No additional details    Date of next INR:  1/16/2018         How to take your warfarin dose     To take:  5 mg Take 1 of the 5 mg tablets.    To take:  7.5 mg Take 1.5 of the 5 mg tablets.

## 2023-01-19 ENCOUNTER — TELEPHONE (OUTPATIENT)
Dept: OBGYN CLINIC | Age: 33
End: 2023-01-19

## 2023-01-19 RX ORDER — FLUCONAZOLE 150 MG/1
150 TABLET ORAL
Qty: 2 TABLET | Refills: 0 | Status: SHIPPED | OUTPATIENT
Start: 2023-01-19 | End: 2023-01-25

## 2023-01-19 NOTE — TELEPHONE ENCOUNTER
Pt called to report possible yeast infection symptoms: burning with urination, itching, white creamy discharge, vaginal swelling. Pt stated she already tried Diflucan and it was not successful. Pt states her wife also has yeast and thrush at the same time as her. Please Advise.

## 2023-01-23 ENCOUNTER — OFFICE VISIT (OUTPATIENT)
Dept: OBGYN CLINIC | Age: 33
End: 2023-01-23
Payer: MEDICAID

## 2023-01-23 VITALS — TEMPERATURE: 97.8 F | SYSTOLIC BLOOD PRESSURE: 128 MMHG | HEART RATE: 90 BPM | DIASTOLIC BLOOD PRESSURE: 82 MMHG

## 2023-01-23 DIAGNOSIS — A60.04: Primary | ICD-10-CM

## 2023-01-23 DIAGNOSIS — N94.9 PAIN OF FEMALE GENITALIA: ICD-10-CM

## 2023-01-23 DIAGNOSIS — N90.89 VULVAR LESION: ICD-10-CM

## 2023-01-23 PROCEDURE — 99213 OFFICE O/P EST LOW 20 MIN: CPT | Performed by: OBSTETRICS & GYNECOLOGY

## 2023-01-23 RX ORDER — VALACYCLOVIR HYDROCHLORIDE 500 MG/1
500 TABLET, FILM COATED ORAL DAILY
Qty: 90 TABLET | Refills: 1 | Status: SHIPPED | OUTPATIENT
Start: 2023-01-23 | End: 2023-04-23

## 2023-01-23 RX ORDER — TOPIRAMATE 25 MG/1
25 TABLET ORAL 2 TIMES DAILY
COMMUNITY

## 2023-01-23 RX ORDER — TRAZODONE HYDROCHLORIDE 100 MG/1
100 TABLET ORAL NIGHTLY
COMMUNITY

## 2023-01-23 RX ORDER — VALACYCLOVIR HYDROCHLORIDE 1 G/1
1000 TABLET, FILM COATED ORAL 2 TIMES DAILY
Qty: 20 TABLET | Refills: 0 | Status: SHIPPED | OUTPATIENT
Start: 2023-01-23 | End: 2023-02-02

## 2023-01-23 RX ORDER — GABAPENTIN 100 MG/1
100 CAPSULE ORAL 2 TIMES DAILY
Qty: 10 CAPSULE | Refills: 0 | Status: SHIPPED | OUTPATIENT
Start: 2023-01-23 | End: 2023-01-24

## 2023-01-23 NOTE — PROGRESS NOTES
[facility]   Return Gyn Office Visit    CC:   Chief Complaint   Patient presents with    Other       HPI:  27 y. o. who presents to [facility] for 1. Concern for yeast faction, 2. Vulvar lesion. Patient states lesion has been present for about a week and is exquisitely tender on palpation. Hot water does help. Urination is difficult and painful. States the area feels like \"battery acid\". Called in last week for yeast infection concerns, states Diflucan has helped some. Is sexually active with wife who has a history of cold sores. Otherwise is doing well. Denies fevers chills nausea vomiting abdominal pain. Review of Systems - The following ROS was otherwise negative, except as noted in the HPI: constitutional, respiratory, cardiovascular, gastrointestinal, genitourinary    Objective:  /82 (Site: Left Upper Arm, Position: Sitting, Cuff Size: Medium Adult)   Pulse 90   Temp 97.8 °F (36.6 °C) (Infrared)   General: Alert, well appearing, no acute distress   Resp effort within normal limits   Abdomen: Soft, nontender, nondistended   Pelvic:  External genitalia with ulcerations along bilateral labia minora, tender to palpation. Findings consistent with HSV. Culture obtained. Moist, pink vagina with physiologic discharge. Exam chaperoned by female assistant  Skin: No visible lesions / rashes / concerning nevus   Extremities: No redness or tenderness, neg Yudelka's sign  Osteopathic: no TART changes    Assessment/Plan   Diagnosis Orders   1. Primary herpetic vulvovaginitis        2. Vulvar lesion  Culture, HSV    valACYclovir (VALTREX) 1 g tablet    valACYclovir (VALTREX) 500 MG tablet      3. Pain of female genitalia  gabapentin (NEURONTIN) 100 MG capsule         -Reviewed likelihood of a primary HSV genital outbreak --we will send in Rx for current episodic treatment and then daily suppressive therapy.   -HSV culture pending   -Return precautions reviewed    Follow Up   Return if symptoms worsen or fail to improve.     Félix Ferraro, DO

## 2023-01-24 DIAGNOSIS — N94.9 PAIN OF FEMALE GENITALIA: ICD-10-CM

## 2023-01-24 RX ORDER — GABAPENTIN 100 MG/1
CAPSULE ORAL
Qty: 10 CAPSULE | Refills: 0 | Status: SHIPPED | OUTPATIENT
Start: 2023-01-24 | End: 2023-01-29

## 2023-01-26 LAB
FINAL REPORT: NORMAL
PRELIMINARY: NORMAL

## 2023-01-29 DIAGNOSIS — N90.89 VULVAR LESION: ICD-10-CM

## 2023-01-30 RX ORDER — VALACYCLOVIR HYDROCHLORIDE 1 G/1
TABLET, FILM COATED ORAL
Qty: 20 TABLET | Refills: 0 | OUTPATIENT
Start: 2023-01-30

## 2023-01-30 NOTE — TELEPHONE ENCOUNTER
I sent in this one over the weekend, one is for maintenance and the other is for her current episode (this is the valtrex).   ESSIE

## 2023-01-31 DIAGNOSIS — N90.89 VULVAR LESION: ICD-10-CM

## 2023-02-01 RX ORDER — VALACYCLOVIR HYDROCHLORIDE 1 G/1
TABLET, FILM COATED ORAL
Qty: 20 TABLET | Refills: 0 | OUTPATIENT
Start: 2023-02-01

## 2023-03-28 ENCOUNTER — HOSPITAL ENCOUNTER (EMERGENCY)
Age: 33
Discharge: HOME | End: 2023-03-28
Payer: COMMERCIAL

## 2023-03-28 VITALS
RESPIRATION RATE: 16 BRPM | DIASTOLIC BLOOD PRESSURE: 80 MMHG | BODY MASS INDEX: 33.29 KG/M2 | OXYGEN SATURATION: 98 % | HEART RATE: 107 BPM | WEIGHT: 195 LBS | HEIGHT: 64 IN | TEMPERATURE: 98.6 F | SYSTOLIC BLOOD PRESSURE: 115 MMHG

## 2023-03-28 DIAGNOSIS — T14.8XXA FOREIGN BODY IN SKIN: Primary | ICD-10-CM

## 2023-03-28 PROCEDURE — 90471 IMMUNIZATION ADMIN: CPT | Performed by: PHYSICIAN ASSISTANT

## 2023-03-28 PROCEDURE — 90715 TDAP VACCINE 7 YRS/> IM: CPT | Performed by: PHYSICIAN ASSISTANT

## 2023-03-28 PROCEDURE — 6360000002 HC RX W HCPCS: Performed by: PHYSICIAN ASSISTANT

## 2023-03-28 PROCEDURE — 99284 EMERGENCY DEPT VISIT MOD MDM: CPT

## 2023-03-28 PROCEDURE — 10120 INC&RMVL FB SUBQ TISS SMPL: CPT

## 2023-03-28 RX ADMIN — TETANUS TOXOID, REDUCED DIPHTHERIA TOXOID AND ACELLULAR PERTUSSIS VACCINE, ADSORBED 0.5 ML: 5; 2.5; 8; 8; 2.5 SUSPENSION INTRAMUSCULAR at 21:07

## 2023-03-28 ASSESSMENT — PAIN - FUNCTIONAL ASSESSMENT
PAIN_FUNCTIONAL_ASSESSMENT: NONE - DENIES PAIN
PAIN_FUNCTIONAL_ASSESSMENT: NONE - DENIES PAIN

## 2023-03-29 NOTE — ED PROVIDER NOTES
Depression Paternal Grandmother     Dementia Paternal Grandmother     Heart Disease Paternal Grandmother      Social History:  Social History     Socioeconomic History    Marital status:      Spouse name: Agnes Harrsi    Number of children: 4    Years of education: Not on file    Highest education level: Not on file   Occupational History    Occupation: barista   Tobacco Use    Smoking status: Never    Smokeless tobacco: Never   Vaping Use    Vaping Use: Every day    Substances: Nicotine, Flavoring, D-8    Substance and Sexual Activity    Alcohol use: No    Drug use: Yes     Types: Marijuana [place])     Comment: daily medical    Sexual activity: Yes     Partners: Female   Other Topics Concern    Not on file   Social History Narrative    Not on file     Social Determinants of Health     Financial Resource Strain: Low Risk     Difficulty of Paying Living Expenses: Not hard at all   Food Insecurity: No Food Insecurity    Worried About [address] in the Last Year: Never true    [address]  [email or web address] in the Last Year: Never true   Transportation Needs: No Transportation Needs    Lack of Transportation (Medical): No    Lack of Transportation (Non-Medical):  No   Physical Activity: Not on file   Stress: Not on file   Social Connections: Not on file   Intimate Partner Violence: Not on file   Housing Stability: Not on file     Current Medications:  Current Facility-Administered Medications   Medication Dose Route Frequency Provider Last Rate Last Admin    tetanus-diphth-acell pertussis (BOOSTRIX) injection 0.5 mL  0.5 mL IntraMUSCular Once KARLA Gallardo         Current Outpatient Medications   Medication Sig Dispense Refill    vilazodone HCl (VIIBRYD) 40 MG TABS Take 40 mg by mouth daily      topiramate (TOPAMAX) 25 MG tablet Take 25 mg by mouth 2 times daily      predniSONE (DELTASONE) 20 MG tablet Take 2 tablets daily      Multiple Vitamins-Minerals (THERAPEUTIC MULTIVITAMIN-MINERALS) tablet Take 1 tablet by mouth

## 2023-05-11 ENCOUNTER — HOSPITAL ENCOUNTER (EMERGENCY)
Age: 33
Discharge: HOME | End: 2023-05-11
Payer: COMMERCIAL

## 2023-05-11 ENCOUNTER — APPOINTMENT (OUTPATIENT)
Dept: GENERAL RADIOLOGY | Age: 33
End: 2023-05-11
Payer: COMMERCIAL

## 2023-05-11 VITALS
OXYGEN SATURATION: 100 % | SYSTOLIC BLOOD PRESSURE: 142 MMHG | HEIGHT: 64 IN | WEIGHT: 195 LBS | DIASTOLIC BLOOD PRESSURE: 79 MMHG | HEART RATE: 105 BPM | BODY MASS INDEX: 33.29 KG/M2 | TEMPERATURE: 97.9 F | RESPIRATION RATE: 16 BRPM

## 2023-05-11 DIAGNOSIS — S60.221A CONTUSION OF RIGHT HAND, INITIAL ENCOUNTER: Primary | ICD-10-CM

## 2023-05-11 PROCEDURE — 73130 X-RAY EXAM OF HAND: CPT

## 2023-05-11 PROCEDURE — 99283 EMERGENCY DEPT VISIT LOW MDM: CPT

## 2023-05-11 PROCEDURE — 6370000000 HC RX 637 (ALT 250 FOR IP): Performed by: NURSE PRACTITIONER

## 2023-05-11 RX ORDER — ARIPIPRAZOLE 2 MG/1
TABLET ORAL
COMMUNITY
Start: 2023-04-27

## 2023-05-11 RX ORDER — ACETAMINOPHEN 500 MG
1000 TABLET ORAL ONCE
Status: COMPLETED | OUTPATIENT
Start: 2023-05-11 | End: 2023-05-11

## 2023-05-11 RX ORDER — VENLAFAXINE HYDROCHLORIDE 150 MG/1
CAPSULE, EXTENDED RELEASE ORAL
COMMUNITY
Start: 2023-03-30

## 2023-05-11 RX ADMIN — ACETAMINOPHEN 1000 MG: 500 TABLET ORAL at 01:07

## 2023-05-11 ASSESSMENT — PAIN DESCRIPTION - DESCRIPTORS: DESCRIPTORS: ACHING

## 2023-05-11 ASSESSMENT — PAIN SCALES - GENERAL
PAINLEVEL_OUTOF10: 7
PAINLEVEL_OUTOF10: 6

## 2023-05-11 ASSESSMENT — PAIN DESCRIPTION - LOCATION
LOCATION: WRIST
LOCATION: WRIST

## 2023-05-11 ASSESSMENT — PAIN DESCRIPTION - ORIENTATION: ORIENTATION: RIGHT

## 2023-05-11 ASSESSMENT — PAIN - FUNCTIONAL ASSESSMENT: PAIN_FUNCTIONAL_ASSESSMENT: 0-10

## 2023-05-11 ASSESSMENT — PAIN DESCRIPTION - PAIN TYPE: TYPE: ACUTE PAIN

## 2023-05-11 NOTE — ED NOTES
Patient left via personal vehicle to private residence in stable condition. Patients vitals were assessed before discharge and were stable. Patient verbalized understanding of discharge instructions, follow up and medications. RN explained information in discharge packet and patient verbalized they have no questions at this time.  Patient left ER with all paperwork and belongings that RN is aware of.        Nereida Arriaga RN  05/11/23 0126

## 2023-05-15 ENCOUNTER — HOSPITAL ENCOUNTER (EMERGENCY)
Age: 33
Discharge: HOME | End: 2023-05-15
Payer: COMMERCIAL

## 2023-05-15 VITALS
WEIGHT: 195 LBS | HEART RATE: 94 BPM | SYSTOLIC BLOOD PRESSURE: 119 MMHG | RESPIRATION RATE: 16 BRPM | TEMPERATURE: 98.2 F | HEIGHT: 64 IN | BODY MASS INDEX: 33.29 KG/M2 | OXYGEN SATURATION: 100 % | DIASTOLIC BLOOD PRESSURE: 88 MMHG
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
## 2023-05-15 PROCEDURE — 99283 EMERGENCY DEPT VISIT LOW MDM: CPT

## 2023-05-15 PROCEDURE — 6370000000 HC RX 637 (ALT 250 FOR IP): Performed by: PHYSICIAN ASSISTANT

## 2023-05-15 RX ORDER — IBUPROFEN 800 MG/1
800 TABLET ORAL ONCE
Status: COMPLETED | OUTPATIENT
Start: 2023-05-15 | End: 2023-05-15

## 2023-05-15 RX ORDER — TETRACAINE HYDROCHLORIDE 5 MG/ML
1 SOLUTION OPHTHALMIC ONCE
Status: COMPLETED | OUTPATIENT
Start: 2023-05-15 | End: 2023-05-15

## 2023-05-15 RX ADMIN — IBUPROFEN 800 MG: 800 TABLET, FILM COATED ORAL at 23:07

## 2023-05-15 RX ADMIN — TETRACAINE HYDROCHLORIDE 1 DROP: 5 SOLUTION OPHTHALMIC at 23:22

## 2023-05-15 RX ADMIN — FLUORESCEIN SODIUM 1 MG: 1 STRIP OPHTHALMIC at 23:21

## 2023-05-15 ASSESSMENT — PAIN - FUNCTIONAL ASSESSMENT: PAIN_FUNCTIONAL_ASSESSMENT: 0-10

## 2023-05-15 ASSESSMENT — PAIN DESCRIPTION - LOCATION
LOCATION: EYE
LOCATION: EYE

## 2023-05-15 ASSESSMENT — PAIN DESCRIPTION - ORIENTATION
ORIENTATION: LEFT
ORIENTATION: LEFT

## 2023-05-15 ASSESSMENT — PAIN DESCRIPTION - DESCRIPTORS
DESCRIPTORS: ACHING
DESCRIPTORS: ACHING

## 2023-05-15 ASSESSMENT — PAIN SCALES - GENERAL
PAINLEVEL_OUTOF10: 4
PAINLEVEL_OUTOF10: 4

## 2023-05-16 NOTE — DISCHARGE INSTRUCTIONS
-Come back if you feel worse. -See your primary doctor next week. 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

## 2023-05-16 NOTE — ED PROVIDER NOTES
[facility]  ED  EMERGENCY DEPARTMENT ENCOUNTER        Pt Name: Osito Martel  MRN: [id number]  [place] 1990  Date of evaluation: 5/11/2023  Provider: FILOMENA Gilman CNP  PCP: No primary care provider on file. DORA. I have evaluated this patient. My supervising physician was available for consultation. CHIEF COMPLAINT       Chief Complaint   Patient presents with    Wrist Pain     RIGHT. STATES HIT A WALL       HISTORY OF PRESENT ILLNESS: 1 or more Elements     History From: the patient. Limitations to history : None    Osito Martel is a 27 y.o. female who presents to the emergency department today with complaints of right wrist/hand pain. Patient states that she punched a wall, concerned about a possible fracture. No other injuries or complaints. Here for further evaluation. Nursing Notes were all reviewed and agreed with or any disagreements were addressed in the HPI. REVIEW OF SYSTEMS :      Review of Systems    Positives and Pertinent negatives as per HPI.      SURGICAL HISTORY     Past Surgical History:   Procedure Laterality Date    ANKLE FRACTURE SURGERY Right 05/2021    CHOLECYSTECTOMY      DILATION AND CURETTAGE      EYE SURGERY      tear ducts    WRIST GANGLION EXCISION         CURRENTMEDICATIONS       Discharge Medication List as of 5/11/2023  1:26 AM        CONTINUE these medications which have NOT CHANGED    Details   venlafaxine (EFFEXOR XR) 150 MG extended release capsule Historical Med      ARIPiprazole (ABILIFY) 2 MG tablet Historical Med      vilazodone HCl (VIIBRYD) 40 MG TABS Take 40 mg by mouth dailyHistorical Med      !! topiramate (TOPAMAX) 25 MG tablet Take 25 mg by mouth 2 times dailyHistorical Med      Multiple Vitamins-Minerals (THERAPEUTIC MULTIVITAMIN-MINERALS) tablet Take 1 tablet by mouth dailyHistorical Med      busPIRone (BUSPAR) 10 MG tablet Take 15 mg by mouth 3 times dailyHistorical Med      traZODone (DESYREL) 100 MG

## 2023-05-16 NOTE — ED PROVIDER NOTES
[facility]  ED  EMERGENCY DEPARTMENT ENCOUNTER        Pt Name: Scott Felty  MRN: [id number]  [place] 1990  Date of evaluation: 5/15/2023  Provider: KARLA Mcclellan  PCP: No primary care provider on file. Note Started: 10:56 PM EDT       DORA. I have evaluated this patient. My supervising physician was available for consultation. CHIEF COMPLAINT       Chief Complaint   Patient presents with    Eye Injury     Doing glass work and it exploded. Thinks she may have glass in left eye 4/10 pain       HISTORY OF PRESENT ILLNESS      Chief Complaint: Left eye injury    Scott Felty is a 27 y.o. female who presents to the emergency room for evaluation of left eye injury. Patient was working with multiple glass, when it exploded and hit her in the face. She is not wearing eye glasses or eye protection. She was able to brush most of the broken glass off, but has a few points of tenderness, around her left eye. She reports some mild irritation with movement of the eye. Some mild photophobia, but no visual changes. SCREENINGS    Cr Coma Scale  Eye Opening: Spontaneous  Best Verbal Response: Oriented  Best Motor Response: Obeys commands  Ryan Coma Scale Score: 15      Is this patient to be included in the SEP-1 Core Measure due to severe sepsis or septic shock? No   Exclusion criteria - the patient is NOT to be included for SEP-1 Core Measure due to: Infection is not suspected      PHYSICAL EXAM     Vitals: /88   Pulse 94   Temp 98.2 °F (36.8 °C) (Oral)   Resp 16   Ht 5' 4\" (1.626 m)   Wt 195 lb (88.5 kg)   SpO2 100%   BMI 33.47 kg/m²    General: awake, alert, no apparent distress  Pupils: equal, reactive  Eyes: Extraocular motion intact. No corneal abrasions under fluorescein stain. Pressure 13  Head: 1 or 2 pieces of glass around the left eye in the dermis. Heart: Rate as noted, regular rhythm, no murmur or rubs.   Chest/Lungs: CTAB, no wheezes or

## 2023-05-19 DIAGNOSIS — N90.89 VULVAR LESION: ICD-10-CM

## 2023-05-22 RX ORDER — VALACYCLOVIR HYDROCHLORIDE 500 MG/1
TABLET, FILM COATED ORAL
Qty: 90 TABLET | Refills: 1 | Status: SHIPPED | OUTPATIENT
Start: 2023-05-22

## 2023-05-23 ENCOUNTER — APPOINTMENT (OUTPATIENT)
Dept: CT IMAGING | Age: 33
End: 2023-05-23
Payer: COMMERCIAL

## 2023-05-23 ENCOUNTER — APPOINTMENT (OUTPATIENT)
Dept: GENERAL RADIOLOGY | Age: 33
End: 2023-05-23
Payer: COMMERCIAL

## 2023-05-23 ENCOUNTER — HOSPITAL ENCOUNTER (EMERGENCY)
Age: 33
Discharge: HOME | End: 2023-05-23
Attending: STUDENT IN AN ORGANIZED HEALTH CARE EDUCATION/TRAINING PROGRAM
Payer: COMMERCIAL

## 2023-05-23 VITALS
DIASTOLIC BLOOD PRESSURE: 86 MMHG | TEMPERATURE: 97.9 F | SYSTOLIC BLOOD PRESSURE: 133 MMHG | RESPIRATION RATE: 20 BRPM | OXYGEN SATURATION: 98 % | HEART RATE: 106 BPM

## 2023-05-23 DIAGNOSIS — S62.339A CLOSED BOXER'S FRACTURE, INITIAL ENCOUNTER: ICD-10-CM

## 2023-05-23 DIAGNOSIS — S89.92XA INJURY OF LEFT KNEE, INITIAL ENCOUNTER: ICD-10-CM
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
## 2023-05-23 PROCEDURE — 73560 X-RAY EXAM OF KNEE 1 OR 2: CPT

## 2023-05-23 PROCEDURE — 99284 EMERGENCY DEPT VISIT MOD MDM: CPT

## 2023-05-23 PROCEDURE — 72125 CT NECK SPINE W/O DYE: CPT

## 2023-05-23 PROCEDURE — 70450 CT HEAD/BRAIN W/O DYE: CPT

## 2023-05-23 PROCEDURE — 70486 CT MAXILLOFACIAL W/O DYE: CPT

## 2023-05-23 PROCEDURE — 73130 X-RAY EXAM OF HAND: CPT

## 2023-05-23 PROCEDURE — 29125 APPL SHORT ARM SPLINT STATIC: CPT

## 2023-05-23 PROCEDURE — 6370000000 HC RX 637 (ALT 250 FOR IP): Performed by: STUDENT IN AN ORGANIZED HEALTH CARE EDUCATION/TRAINING PROGRAM

## 2023-05-23 RX ORDER — OXYCODONE HYDROCHLORIDE 5 MG/1
5 TABLET ORAL ONCE
Status: COMPLETED | OUTPATIENT
Start: 2023-05-23 | End: 2023-05-23

## 2023-05-23 RX ORDER — CLONAZEPAM 1 MG/1
1 TABLET ORAL ONCE
Status: COMPLETED | OUTPATIENT
Start: 2023-05-23 | End: 2023-05-23

## 2023-05-23 RX ADMIN — CLONAZEPAM 1 MG: 1 TABLET ORAL at 19:25

## 2023-05-23 RX ADMIN — OXYCODONE 5 MG: 5 TABLET ORAL at 20:16

## 2023-05-23 ASSESSMENT — PAIN SCALES - GENERAL
PAINLEVEL_OUTOF10: 7
PAINLEVEL_OUTOF10: 7

## 2023-05-23 ASSESSMENT — LIFESTYLE VARIABLES
HOW OFTEN DO YOU HAVE A DRINK CONTAINING ALCOHOL: NEVER
HOW MANY STANDARD DRINKS CONTAINING ALCOHOL DO YOU HAVE ON A TYPICAL DAY: PATIENT DOES NOT DRINK

## 2023-05-23 ASSESSMENT — PAIN DESCRIPTION - ORIENTATION: ORIENTATION: RIGHT

## 2023-05-23 ASSESSMENT — PAIN - FUNCTIONAL ASSESSMENT: PAIN_FUNCTIONAL_ASSESSMENT: 0-10

## 2023-05-23 ASSESSMENT — PAIN DESCRIPTION - LOCATION: LOCATION: HAND
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
## 2023-06-05 RX ORDER — LEVONORGESTREL / ETHINYL ESTRADIOL AND ETHINYL ESTRADIOL 150-30(84)
1 KIT ORAL DAILY
Qty: 1 PACKET | Refills: 3 | Status: SHIPPED | OUTPATIENT
Start: 2023-06-05

## 2023-06-05 NOTE — TELEPHONE ENCOUNTER
Pt requesting Rx refill, pt is on her last active pill today. Pt is scheduled for annual on 8/22/23. Please sign or advise.

## 2023-07-20 NOTE — TELEPHONE ENCOUNTER
Medication refill       Last Visit   06/30/2022    Next Visit  10/03/2022       ferrous sulfate (IRON 325) 325 (65 Fe) MG tablet   05/24/2022 #30 W/ 1 REFILL Surgery

## 2023-08-22 ENCOUNTER — OFFICE VISIT (OUTPATIENT)
Dept: OBGYN CLINIC | Age: 33
End: 2023-08-22
Payer: COMMERCIAL

## 2023-08-22 VITALS — DIASTOLIC BLOOD PRESSURE: 68 MMHG | TEMPERATURE: 98 F | SYSTOLIC BLOOD PRESSURE: 110 MMHG | HEART RATE: 78 BPM

## 2023-08-22 DIAGNOSIS — E61.1 IRON DEFICIENCY: ICD-10-CM

## 2023-08-22 DIAGNOSIS — Z30.41 ENCOUNTER FOR SURVEILLANCE OF CONTRACEPTIVE PILLS: ICD-10-CM

## 2023-08-22 DIAGNOSIS — Z01.419 WOMEN'S ANNUAL ROUTINE GYNECOLOGICAL EXAMINATION: Primary | ICD-10-CM

## 2023-08-22 DIAGNOSIS — T74.11XA DOMESTIC PHYSICAL ABUSE OF ADULT: ICD-10-CM

## 2023-08-22 DIAGNOSIS — N92.0 MENORRHAGIA WITH REGULAR CYCLE: ICD-10-CM

## 2023-08-22 DIAGNOSIS — Z12.39 SCREENING BREAST EXAMINATION: ICD-10-CM

## 2023-08-22 DIAGNOSIS — A60.00 RECURRENT GENITAL HSV (HERPES SIMPLEX VIRUS) INFECTION: ICD-10-CM

## 2023-08-22 PROCEDURE — 99395 PREV VISIT EST AGE 18-39: CPT | Performed by: OBSTETRICS & GYNECOLOGY

## 2023-08-22 PROCEDURE — 99999 PR OFFICE/OUTPT VISIT,PROCEDURE ONLY: CPT | Performed by: OBSTETRICS & GYNECOLOGY

## 2023-08-22 RX ORDER — VALACYCLOVIR HYDROCHLORIDE 500 MG/1
500 TABLET, FILM COATED ORAL DAILY
Qty: 90 TABLET | Refills: 1 | Status: SHIPPED | OUTPATIENT
Start: 2023-08-22

## 2023-08-22 RX ORDER — LEVONORGESTREL / ETHINYL ESTRADIOL AND ETHINYL ESTRADIOL 150-30(84)
1 KIT ORAL DAILY
Qty: 1 PACKET | Refills: 3 | Status: SHIPPED | OUTPATIENT
Start: 2023-08-22

## 2023-08-22 RX ORDER — ASPIRIN 325 MG
TABLET ORAL
COMMUNITY
Start: 2023-06-22

## 2023-08-22 RX ORDER — METHOCARBAMOL 750 MG/1
750 TABLET, FILM COATED ORAL 2 TIMES DAILY
COMMUNITY
Start: 2023-08-04

## 2023-08-22 RX ORDER — CYANOCOBALAMIN 1000 UG/ML
1000 INJECTION, SOLUTION INTRAMUSCULAR; SUBCUTANEOUS ONCE
COMMUNITY

## 2023-11-16 ENCOUNTER — TELEPHONE (OUTPATIENT)
Dept: OBGYN CLINIC | Age: 33
End: 2023-11-16

## 2023-11-16 NOTE — TELEPHONE ENCOUNTER
Pt has been bleeding for 10 days straight even while taking her current OCP's. Pt states currently she is not saturating a pad/tampon an hour, pt is not currently lightheaded/dizzy or feeling faint but she reported she has had those symptoms off and on. Pt stated she had history of anemia and is worried about the irregular and prolonged bleeding. Pt was given ED bleeding and pain precautions at this time. Please Advise  's pt.

## 2023-11-16 NOTE — TELEPHONE ENCOUNTER
Bleeding and pain precautions. Report to ED as needed per previous recommendations. Please make sure patient is taking OCPs as prescribed. Ok to add iron supplement. Will need appointment with possible ultrasound. Thanks  Forwarding to Dr. Greene Keli as an [address] as well.

## 2023-11-16 NOTE — TELEPHONE ENCOUNTER
Tried reaching out to patient, [phone] is not in service, [phone] is no longer her number, and [phone] is disconnected. Will send [employer] message to patient to call office, and to update her phone number.

## 2024-02-05 ENCOUNTER — TELEPHONE (OUTPATIENT)
Dept: OBGYN CLINIC | Age: 34
End: 2024-02-05

## 2024-02-05 DIAGNOSIS — B00.9 HSV INFECTION: Primary | ICD-10-CM

## 2024-02-05 RX ORDER — VALACYCLOVIR HYDROCHLORIDE 500 MG/1
500 TABLET, FILM COATED ORAL 2 TIMES DAILY
Qty: 6 TABLET | Refills: 0 | Status: SHIPPED | OUTPATIENT
Start: 2024-02-05 | End: 2024-02-08

## 2024-02-05 NOTE — TELEPHONE ENCOUNTER
OK for anti-viral   If she is not having a GYN reason for fatigue (like heavier than normal periods), refer to PCP for work up     ALH

## 2024-02-05 NOTE — TELEPHONE ENCOUNTER
Pt calling due to outbreak of vaginal HSV infection. She is asking if prescription can be sent in to Pharmacy. Pt also reports that she is feeling Exhausted and tired . She is asking if iron and B12 studies can be done.last pap 8/22/23 Please advise

## 2024-02-10 DIAGNOSIS — A60.00 RECURRENT GENITAL HSV (HERPES SIMPLEX VIRUS) INFECTION: ICD-10-CM

## 2024-02-12 RX ORDER — VALACYCLOVIR HYDROCHLORIDE 500 MG/1
TABLET, FILM COATED ORAL
Qty: 6 TABLET | Refills: 3 | Status: SHIPPED | OUTPATIENT
Start: 2024-02-12

## 2024-02-21 ENCOUNTER — OFFICE VISIT (OUTPATIENT)
Age: 34
End: 2024-02-21

## 2024-02-21 VITALS
DIASTOLIC BLOOD PRESSURE: 86 MMHG | HEART RATE: 97 BPM | SYSTOLIC BLOOD PRESSURE: 124 MMHG | WEIGHT: 190 LBS | TEMPERATURE: 99 F | OXYGEN SATURATION: 97 % | RESPIRATION RATE: 16 BRPM | BODY MASS INDEX: 31.65 KG/M2 | HEIGHT: 65 IN

## 2024-02-21 DIAGNOSIS — J40 BRONCHITIS: Primary | ICD-10-CM

## 2024-02-21 RX ORDER — BENZONATATE 100 MG/1
100 CAPSULE ORAL 3 TIMES DAILY PRN
Qty: 21 CAPSULE | Refills: 0 | Status: SHIPPED | OUTPATIENT
Start: 2024-02-21 | End: 2024-03-02

## 2024-02-21 RX ORDER — DOXYCYCLINE HYCLATE 100 MG
100 TABLET ORAL 2 TIMES DAILY
Qty: 20 TABLET | Refills: 0 | Status: SHIPPED | OUTPATIENT
Start: 2024-02-21 | End: 2024-03-02

## 2024-02-21 ASSESSMENT — ENCOUNTER SYMPTOMS
WHEEZING: 1
SORE THROAT: 0
VOMITING: 0
COUGH: 1
SHORTNESS OF BREATH: 0
DIARRHEA: 0
NAUSEA: 0

## 2024-02-21 NOTE — PROGRESS NOTES
Maryuri Reveles (:  1990) is a 33 y.o. female,New patient, here for evaluation of the following chief complaint(s):  Cough (W/wheezing x 1 wk, has been productive )      ASSESSMENT/PLAN:    ICD-10-CM    1. Bronchitis  J40 benzonatate (TESSALON) 100 MG capsule     doxycycline hyclate (VIBRA-TABS) 100 MG tablet        No flu or covid  testing due to time /# of days ill..  Laryngitis , rest of exam (-)       Patient declined oral steroid  Start antibiotic since symptoms not improving although still can be dealing with viral cause for symptoms as discussed    Keep hydrated, tylenol or ibuprofen (if no contraindications) as needed if pain or fever.. Take medications as prescribed.. follow up in 7- days if not better  Return sooner or go see PCP if symptoms worse/feeling worse or has new symptoms or concerns  Go to ER  if fever/chills, increase shortness of breath, increase congestion or wheezing despite medications, if associated with chest pain, nausea/vomiting, dizzy/ light-headed sensation.      SUBJECTIVE/OBJECTIVE:  Patient presents with:  Cough: W/wheezing x 1 wk, has been productive          History provided by:  Patient      Vitals:    24 1210   BP: 124/86   Site: Left Upper Arm   Position: Sitting   Cuff Size: Large Adult   Pulse: 97   Resp: 16   Temp: 99 °F (37.2 °C)   TempSrc: Oral   SpO2: 97%   Weight: 86.2 kg (190 lb)   Height: 1.638 m (5' 4.5\")       Review of Systems   Constitutional:  Negative for fever.   HENT:  Negative for sore throat.    Respiratory:  Positive for cough and wheezing. Negative for shortness of breath.    Gastrointestinal:  Negative for diarrhea, nausea and vomiting.   Genitourinary:  Negative for difficulty urinating.   Neurological:  Negative for headaches.       Physical Exam    Physical  Vitals signs: reviewed  Constitutional:  appearance: well nourished ..  does not appear acutely ill     Eyes:                 Pupil: equal-round-reactive to light, no photophobia,

## 2024-02-21 NOTE — PATIENT INSTRUCTIONS
Keep hydrated, tylenol or ibuprofen (if no contraindications) as needed if pain or fever.. Take medications as prescribed.. follow up in 7- days if not better  Return sooner or go see PCP if symptoms worse/feeling worse or has new symptoms or concerns  Go to ER  if fever/chills, increase shortness of breath, increase congestion or wheezing despite medications, if associated with chest pain, nausea/vomiting, dizzy/ light-headed sensation.

## 2024-04-14 VITALS
RESPIRATION RATE: 18 BRPM | BODY MASS INDEX: 33.29 KG/M2 | DIASTOLIC BLOOD PRESSURE: 103 MMHG | HEART RATE: 86 BPM | TEMPERATURE: 98.2 F | WEIGHT: 195 LBS | HEIGHT: 64 IN | OXYGEN SATURATION: 100 % | SYSTOLIC BLOOD PRESSURE: 156 MMHG

## 2024-04-14 LAB
BILIRUB UR QL STRIP.AUTO: NEGATIVE
CLARITY UR: CLEAR
COLOR UR: YELLOW
GLUCOSE UR STRIP.AUTO-MCNC: NEGATIVE MG/DL
HCG UR QL: NEGATIVE
HGB UR QL STRIP.AUTO: NEGATIVE
KETONES UR STRIP.AUTO-MCNC: ABNORMAL MG/DL
LEUKOCYTE ESTERASE UR QL STRIP.AUTO: NEGATIVE
NITRITE UR QL STRIP.AUTO: NEGATIVE
PH UR STRIP.AUTO: 7 [PH] (ref 5–8)
PROT UR STRIP.AUTO-MCNC: NEGATIVE MG/DL
SP GR UR STRIP.AUTO: 1.01 (ref 1–1.03)
UA COMPLETE W REFLEX CULTURE PNL UR: ABNORMAL
UA DIPSTICK W REFLEX MICRO PNL UR: ABNORMAL
URN SPEC COLLECT METH UR: ABNORMAL
UROBILINOGEN UR STRIP-ACNC: 0.2 E.U./DL

## 2024-04-14 PROCEDURE — 96372 THER/PROPH/DIAG INJ SC/IM: CPT

## 2024-04-14 PROCEDURE — 84703 CHORIONIC GONADOTROPIN ASSAY: CPT

## 2024-04-14 PROCEDURE — 81003 URINALYSIS AUTO W/O SCOPE: CPT

## 2024-04-14 PROCEDURE — 99284 EMERGENCY DEPT VISIT MOD MDM: CPT

## 2024-04-14 PROCEDURE — 6370000000 HC RX 637 (ALT 250 FOR IP): Performed by: EMERGENCY MEDICINE

## 2024-04-14 PROCEDURE — 6360000002 HC RX W HCPCS: Performed by: EMERGENCY MEDICINE

## 2024-04-14 RX ORDER — METHOCARBAMOL 750 MG/1
1500 TABLET, FILM COATED ORAL ONCE
Status: COMPLETED | OUTPATIENT
Start: 2024-04-14 | End: 2024-04-14

## 2024-04-14 RX ORDER — HYDROCODONE BITARTRATE AND ACETAMINOPHEN 5; 325 MG/1; MG/1
1 TABLET ORAL ONCE
Status: COMPLETED | OUTPATIENT
Start: 2024-04-14 | End: 2024-04-14

## 2024-04-14 RX ORDER — LIDOCAINE 4 G/G
1 PATCH TOPICAL ONCE
Status: DISCONTINUED | OUTPATIENT
Start: 2024-04-14 | End: 2024-04-15 | Stop reason: HOSPADM

## 2024-04-14 RX ORDER — DEXAMETHASONE SODIUM PHOSPHATE 4 MG/ML
8 INJECTION, SOLUTION INTRA-ARTICULAR; INTRALESIONAL; INTRAMUSCULAR; INTRAVENOUS; SOFT TISSUE ONCE
Status: COMPLETED | OUTPATIENT
Start: 2024-04-14 | End: 2024-04-14

## 2024-04-14 RX ORDER — LIDOCAINE 4 G/G
1 PATCH TOPICAL DAILY
Status: DISCONTINUED | OUTPATIENT
Start: 2024-04-15 | End: 2024-04-14

## 2024-04-14 RX ADMIN — METHOCARBAMOL 1500 MG: 750 TABLET ORAL at 22:27

## 2024-04-14 RX ADMIN — HYDROCODONE BITARTRATE AND ACETAMINOPHEN 1 TABLET: 5; 325 TABLET ORAL at 22:27

## 2024-04-14 RX ADMIN — DEXAMETHASONE SODIUM PHOSPHATE 8 MG: 4 INJECTION, SOLUTION INTRAMUSCULAR; INTRAVENOUS at 22:27

## 2024-04-14 ASSESSMENT — PAIN SCALES - GENERAL: PAINLEVEL_OUTOF10: 8

## 2024-04-14 ASSESSMENT — PAIN DESCRIPTION - LOCATION: LOCATION: BACK

## 2024-04-14 ASSESSMENT — PAIN - FUNCTIONAL ASSESSMENT: PAIN_FUNCTIONAL_ASSESSMENT: 0-10

## 2024-04-15 ENCOUNTER — HOSPITAL ENCOUNTER (EMERGENCY)
Age: 34
Discharge: HOME | End: 2024-04-15
Attending: EMERGENCY MEDICINE
Payer: COMMERCIAL

## 2024-04-15 DIAGNOSIS — M54.42 ACUTE LEFT-SIDED LOW BACK PAIN WITH LEFT-SIDED SCIATICA: Primary | ICD-10-CM

## 2024-04-15 DIAGNOSIS — R03.0 ELEVATED BLOOD PRESSURE READING: ICD-10-CM

## 2024-04-15 PROCEDURE — 6370000000 HC RX 637 (ALT 250 FOR IP): Performed by: EMERGENCY MEDICINE

## 2024-04-15 PROCEDURE — 96372 THER/PROPH/DIAG INJ SC/IM: CPT

## 2024-04-15 PROCEDURE — 6360000002 HC RX W HCPCS: Performed by: EMERGENCY MEDICINE

## 2024-04-15 RX ORDER — ACETAMINOPHEN 500 MG
1000 TABLET ORAL ONCE
Status: COMPLETED | OUTPATIENT
Start: 2024-04-15 | End: 2024-04-15

## 2024-04-15 RX ORDER — METHOCARBAMOL 500 MG/1
500-1000 TABLET, FILM COATED ORAL 3 TIMES DAILY PRN
Qty: 18 TABLET | Refills: 0 | Status: SHIPPED | OUTPATIENT
Start: 2024-04-15 | End: 2024-04-18

## 2024-04-15 RX ORDER — HYDROCODONE BITARTRATE AND ACETAMINOPHEN 5; 325 MG/1; MG/1
1 TABLET ORAL EVERY 8 HOURS PRN
Qty: 5 TABLET | Refills: 0 | Status: SHIPPED | OUTPATIENT
Start: 2024-04-15 | End: 2024-04-17

## 2024-04-15 RX ORDER — KETOROLAC TROMETHAMINE 30 MG/ML
30 INJECTION, SOLUTION INTRAMUSCULAR; INTRAVENOUS ONCE
Status: COMPLETED | OUTPATIENT
Start: 2024-04-15 | End: 2024-04-15

## 2024-04-15 RX ADMIN — KETOROLAC TROMETHAMINE 30 MG: 30 INJECTION INTRAMUSCULAR; INTRAVENOUS at 01:29

## 2024-04-15 RX ADMIN — ACETAMINOPHEN 1000 MG: 500 TABLET ORAL at 01:29

## 2024-04-15 ASSESSMENT — PAIN SCALES - GENERAL: PAINLEVEL_OUTOF10: 7

## 2024-04-15 NOTE — DISCHARGE INSTRUCTIONS
Take Tylenol or ibuprofen as needed for pain.  Take Robaxin as needed as for muscle relaxer.  Take Norco for breakthrough pain but do not drive with Norco or Robaxin.  Understand Norco can be addictive.    Follow-up with primary doctor over the next 3 to 5 days for any other concerns and possible outpatient lumbar MRI if not improving.    If you do develop any significant lightheadedness with passing out, abdominal pain with vomiting, high fever, new weakness in the legs, significant trouble walking, new onset chest pain, or any other concerns over the next 6 to 24 hours, then return to the emergency department immediately for further evaluation.    Have your blood pressure rechecked over the next 1 to 2 weeks and if still elevated, you may need medication for this.
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
## 2024-04-18 ENCOUNTER — OFFICE VISIT (OUTPATIENT)
Age: 34
End: 2024-04-18

## 2024-04-18 VITALS
SYSTOLIC BLOOD PRESSURE: 128 MMHG | OXYGEN SATURATION: 97 % | TEMPERATURE: 97.6 F | HEART RATE: 99 BPM | DIASTOLIC BLOOD PRESSURE: 85 MMHG

## 2024-04-18 DIAGNOSIS — M54.50 LUMBAR PAIN: Primary | ICD-10-CM

## 2024-04-18 DIAGNOSIS — M62.830 SPASM OF LUMBAR PARASPINOUS MUSCLE: ICD-10-CM

## 2024-04-18 PROBLEM — J06.9 ACUTE UPPER RESPIRATORY INFECTION: Status: ACTIVE | Noted: 2023-12-29

## 2024-04-18 RX ORDER — TIZANIDINE 4 MG/1
4 TABLET ORAL 4 TIMES DAILY PRN
Qty: 40 TABLET | Refills: 0 | Status: SHIPPED | OUTPATIENT
Start: 2024-04-18

## 2024-04-18 RX ORDER — DEXAMETHASONE SODIUM PHOSPHATE 4 MG/ML
8 INJECTION, SOLUTION INTRA-ARTICULAR; INTRALESIONAL; INTRAMUSCULAR; INTRAVENOUS; SOFT TISSUE ONCE
Status: COMPLETED | OUTPATIENT
Start: 2024-04-18 | End: 2024-04-18

## 2024-04-18 RX ORDER — DEXAMETHASONE SODIUM PHOSPHATE 4 MG/ML
8 INJECTION, SOLUTION INTRA-ARTICULAR; INTRALESIONAL; INTRAMUSCULAR; INTRAVENOUS; SOFT TISSUE ONCE
Status: DISCONTINUED | OUTPATIENT
Start: 2024-04-18 | End: 2024-04-18

## 2024-04-18 RX ORDER — KETOROLAC TROMETHAMINE 10 MG/1
10 TABLET, FILM COATED ORAL EVERY 8 HOURS PRN
Qty: 15 TABLET | Refills: 0 | Status: SHIPPED | OUTPATIENT
Start: 2024-04-18 | End: 2024-04-23

## 2024-04-18 RX ORDER — LIDOCAINE 50 MG/G
1 PATCH TOPICAL DAILY
Qty: 10 PATCH | Refills: 0 | Status: SHIPPED | OUTPATIENT
Start: 2024-04-18 | End: 2024-04-28

## 2024-04-18 RX ADMIN — DEXAMETHASONE SODIUM PHOSPHATE 8 MG: 4 INJECTION, SOLUTION INTRA-ARTICULAR; INTRALESIONAL; INTRAMUSCULAR; INTRAVENOUS; SOFT TISSUE at 13:21

## 2024-04-18 ASSESSMENT — ENCOUNTER SYMPTOMS
BACK PAIN: 1
VOMITING: 0
SHORTNESS OF BREATH: 0
CHEST TIGHTNESS: 0
BACK PAIN: 1
ABDOMINAL PAIN: 0
COLOR CHANGE: 0

## 2024-04-18 NOTE — PROGRESS NOTES
Maryuri Reveles (:  1990) is a 33 y.o. female,Established patient, here for evaluation of the following chief complaint(s):  Back Pain (Threw out back on Saturday , went to hospital Saturday was given Dex, can't stand without being in pain , needs work note as well , states mobic is not helping )      ASSESSMENT/PLAN:    ICD-10-CM    1. Lumbar pain  M54.50 lidocaine (LIDODERM) 5 %     tiZANidine (ZANAFLEX) 4 MG tablet     ketorolac (TORADOL) 10 MG tablet     dexAMETHasone (DECADRON) injection 8 mg      2. Spasm of lumbar paraspinous muscle  M62.830 dexAMETHasone (DECADRON) injection 8 mg          Dexamethasone 8mg given in office  Take the meds as prescribed, monitor for worsening symptoms, changed the muscle relaxer, trying Toradol, cannot do the steroids oral as it causes rages excessively .  Follow up with your pcp in 7 days if symptoms persist or if symptoms worsen.      SUBJECTIVE/OBJECTIVE:    History provided by:  Patient   used: No    Back Pain      HPI:   33 y.o. female presents with symptoms of back pain ongoing since  thew out her back when standing up from bending over. Denies loss of balance, did lean into a case as she stood up with the pain, pt went to the ER that night and was given Percocet, Methocarbamol, Toradol/Dexamethasone. Patient states that the shots did not work in the hospital and the medications that were given are not working. Muscles are spasming and the Methocarbamol is not working. Denies loss of bowel or bladder, passing out, or fainting.     Vitals:    24 1236   BP: 128/85   Pulse: 99   Temp: 97.6 °F (36.4 °C)   TempSrc: Temporal   SpO2: 97%       Review of Systems   Musculoskeletal:  Positive for back pain.       Physical Exam  Vitals and nursing note reviewed.   Constitutional:       Appearance: Normal appearance.   Musculoskeletal:      Lumbar back: Spasms and tenderness present. No swelling, edema, deformity, signs of trauma,

## 2024-04-18 NOTE — PATIENT INSTRUCTIONS
Dexamethasone in office  Take the meds as prescribed, monitor for worsening symptoms, changed the muscle relaxer, trying Toradol, cannot do the steroids oral as it causes rages excessively .  Follow up with your pcp in 7 days if symptoms persist or if symptoms worsen

## 2024-04-20 ENCOUNTER — APPOINTMENT (OUTPATIENT)
Dept: GENERAL RADIOLOGY | Age: 34
End: 2024-04-20
Payer: COMMERCIAL

## 2024-04-20 ENCOUNTER — HOSPITAL ENCOUNTER (EMERGENCY)
Age: 34
Discharge: HOME | End: 2024-04-20
Payer: COMMERCIAL

## 2024-04-20 VITALS
WEIGHT: 195 LBS | OXYGEN SATURATION: 100 % | HEIGHT: 64 IN | RESPIRATION RATE: 16 BRPM | HEART RATE: 86 BPM | TEMPERATURE: 98.9 F | SYSTOLIC BLOOD PRESSURE: 131 MMHG | BODY MASS INDEX: 33.29 KG/M2 | DIASTOLIC BLOOD PRESSURE: 90 MMHG

## 2024-04-20 DIAGNOSIS — S39.012A STRAIN OF LUMBAR REGION, INITIAL ENCOUNTER: Primary | ICD-10-CM

## 2024-04-20 DIAGNOSIS — M54.16 RIGHT LUMBAR RADICULOPATHY: ICD-10-CM

## 2024-04-20 PROCEDURE — 99284 EMERGENCY DEPT VISIT MOD MDM: CPT

## 2024-04-20 PROCEDURE — 6370000000 HC RX 637 (ALT 250 FOR IP): Performed by: PHYSICIAN ASSISTANT

## 2024-04-20 PROCEDURE — 72100 X-RAY EXAM L-S SPINE 2/3 VWS: CPT

## 2024-04-20 PROCEDURE — 96372 THER/PROPH/DIAG INJ SC/IM: CPT

## 2024-04-20 PROCEDURE — 6360000002 HC RX W HCPCS: Performed by: PHYSICIAN ASSISTANT

## 2024-04-20 RX ORDER — KETOROLAC TROMETHAMINE 30 MG/ML
30 INJECTION, SOLUTION INTRAMUSCULAR; INTRAVENOUS ONCE
Status: COMPLETED | OUTPATIENT
Start: 2024-04-20 | End: 2024-04-20

## 2024-04-20 RX ORDER — KETOROLAC TROMETHAMINE 10 MG/1
10 TABLET, FILM COATED ORAL EVERY 6 HOURS PRN
Qty: 20 TABLET | Refills: 0 | Status: SHIPPED
Start: 2024-04-20 | End: 2024-04-20 | Stop reason: CLARIF

## 2024-04-20 RX ORDER — DEXAMETHASONE SODIUM PHOSPHATE 4 MG/ML
8 INJECTION, SOLUTION INTRA-ARTICULAR; INTRALESIONAL; INTRAMUSCULAR; INTRAVENOUS; SOFT TISSUE ONCE
Status: COMPLETED | OUTPATIENT
Start: 2024-04-20 | End: 2024-04-20

## 2024-04-20 RX ORDER — ORPHENADRINE CITRATE 30 MG/ML
60 INJECTION INTRAMUSCULAR; INTRAVENOUS ONCE
Status: COMPLETED | OUTPATIENT
Start: 2024-04-20 | End: 2024-04-20

## 2024-04-20 RX ORDER — LIDOCAINE 4 G/G
1 PATCH TOPICAL DAILY
Status: DISCONTINUED | OUTPATIENT
Start: 2024-04-20 | End: 2024-04-20 | Stop reason: HOSPADM

## 2024-04-20 RX ORDER — PREDNISONE 20 MG/1
40 TABLET ORAL ONCE
Status: DISCONTINUED | OUTPATIENT
Start: 2024-04-20 | End: 2024-04-20

## 2024-04-20 RX ORDER — KETOROLAC TROMETHAMINE 10 MG/1
10 TABLET, FILM COATED ORAL EVERY 6 HOURS PRN
Qty: 30 TABLET | Refills: 0 | Status: SHIPPED | OUTPATIENT
Start: 2024-04-20 | End: 2025-04-20

## 2024-04-20 RX ADMIN — KETOROLAC TROMETHAMINE 30 MG: 30 INJECTION, SOLUTION INTRAMUSCULAR; INTRAVENOUS at 19:18

## 2024-04-20 RX ADMIN — DEXAMETHASONE SODIUM PHOSPHATE 8 MG: 4 INJECTION, SOLUTION INTRAMUSCULAR; INTRAVENOUS at 20:23

## 2024-04-20 RX ADMIN — ORPHENADRINE CITRATE 60 MG: 60 INJECTION INTRAMUSCULAR; INTRAVENOUS at 19:17

## 2024-04-20 ASSESSMENT — PAIN DESCRIPTION - DESCRIPTORS: DESCRIPTORS: ACHING

## 2024-04-20 ASSESSMENT — PAIN SCALES - GENERAL
PAINLEVEL_OUTOF10: 8
PAINLEVEL_OUTOF10: 8

## 2024-04-20 ASSESSMENT — PAIN DESCRIPTION - ORIENTATION
ORIENTATION: LOWER
ORIENTATION: RIGHT;LOWER

## 2024-04-20 ASSESSMENT — LIFESTYLE VARIABLES
HOW MANY STANDARD DRINKS CONTAINING ALCOHOL DO YOU HAVE ON A TYPICAL DAY: 1 OR 2
HOW OFTEN DO YOU HAVE A DRINK CONTAINING ALCOHOL: 2-4 TIMES A MONTH

## 2024-04-20 ASSESSMENT — PAIN DESCRIPTION - PAIN TYPE: TYPE: ACUTE PAIN

## 2024-04-20 ASSESSMENT — PAIN DESCRIPTION - LOCATION
LOCATION: BACK
LOCATION: BACK

## 2024-04-20 ASSESSMENT — PAIN - FUNCTIONAL ASSESSMENT: PAIN_FUNCTIONAL_ASSESSMENT: 0-10

## 2024-04-21 NOTE — ED PROVIDER NOTES
other professionals: None  Social determinants: None  Chronic conditions: Obesity, depression, ADHD, autism, anxiety, EDS, seizures, migraines  Records reviewed: I reviewed the patient's ED visit from earlier this week on 4/15    Disposition considerations/Plan (include 1 test not done- why not, d/c vs admit): Patient arrives with ongoing low back pain after she bent over and \"through out her back earlier in the week\".  Patient has no concerning neurologic symptoms or any concerning infectious signs or symptoms.  Screening x-rays done at her request due to history of EDS.  This is normal.  She is feeling some improvement with meds given in the ED.  I see no indication for further testing and believe she can be safely discharged to follow-up with PCP as well as with her established physical therapist.  Employed shared decision making.     FINAL IMPRESSION:      1. Strain of lumbar region, initial encounter    2. Right lumbar radiculopathy          DISPOSITION/PLAN:   DISPOSITION Decision To Discharge      PATIENT REFERRED TO:  Alma Jeter  [address] [facility] [address] [phone]    Schedule an appointment as soon as possible for a visit         DISCHARGE MEDICATIONS:  Discharge Medication List as of 4/20/2024  8:28 PM        START taking these medications    Details   !! ketorolac (TORADOL) 10 MG tablet Take 1 tablet by mouth every 6 hours as needed for Pain, Disp-20 tablet, R-0Normal       !! - Potential duplicate medications found. Please discuss with provider.                     (Please note thatportions of this note were completed with a voice recognition program.  Efforts were made to edit the dictations, but occasionally words are mis-transcribed.)    HUGH ASH PA-C (electronicallysigned)              Hugh Ash PA  04/20/24 [id number]

## 2024-05-03 ENCOUNTER — OFFICE VISIT (OUTPATIENT)
Age: 34
End: 2024-05-03

## 2024-05-03 VITALS
OXYGEN SATURATION: 98 % | DIASTOLIC BLOOD PRESSURE: 90 MMHG | HEIGHT: 64 IN | HEART RATE: 88 BPM | SYSTOLIC BLOOD PRESSURE: 137 MMHG | WEIGHT: 195 LBS | TEMPERATURE: 98.3 F | BODY MASS INDEX: 33.29 KG/M2 | RESPIRATION RATE: 16 BRPM

## 2024-05-03 DIAGNOSIS — S61.211A LACERATION OF LEFT INDEX FINGER WITHOUT FOREIGN BODY WITHOUT DAMAGE TO NAIL, INITIAL ENCOUNTER: Primary | ICD-10-CM

## 2024-05-03 NOTE — PROGRESS NOTES
Maryuri Reveles (:  1990) is a 33 y.o. female,Established patient, here for evaluation of the following chief complaint(s):  Finger Laceration (Left index)      ASSESSMENT/PLAN:    ICD-10-CM    1. Laceration of left index finger without foreign body without damage to nail, initial encounter  S61.211A           Keep wound clean and dry- can rinse with soapy water-- no scrubbing  Ibuprofen/tylenol (if no contraindications) prn pain  Can remove dressing  this evening or tomorrow and leave open to air at home- can cover if in a dirty environment  Return  anytime if increase in local pain, increase redness or red streak forming or if increase swelling or drainage noted      SUBJECTIVE/OBJECTIVE:  Patient presents with:  Finger Laceration: Left index  Cutting fruit at home...  tetanus reported by patient is current         History provided by:  Patient   used: No        Vitals:    24 1551   BP: (!) 137/90   Site: Right Upper Arm   Position: Sitting   Cuff Size: Large Adult   Pulse: 88   Resp: 16   Temp: 98.3 °F (36.8 °C)   TempSrc: Oral   SpO2: 98%   Weight: 88.5 kg (195 lb)   Height: 1.626 m (5' 4\")       Review of Systems   Skin:  Positive for wound.       Physical Exam    Physical  Vitals signs: reviewed  Constitutional:  appearance: well nourished ..  does not appear acutely ill  ..no distress   Eyes:                 Pupil: equal-round-reactive to light, no photophobia, EOMI            Cornea: clear            Sclera: clear, non injected, non icteric    Pulmonary/Lungs:  effort normal, no stridor                                 Auscultation: good air movement / breath sounds normal  Cardio-vascular:  normal rate and rhythm                              Heart sounds normal-no murmur- no rub    Skin:      Distal left index finger with very superficial minor tangential laceration  Only  2-3  mm in length, no depth to wound..cleaned with saline,, no foreign body..  no active bleeding..

## 2024-05-03 NOTE — PATIENT INSTRUCTIONS
Keep wound clean and dry- can rinse with soapy water-- no scrubbing  Ibuprofen/tylenol (if no contraindications) prn pain  Can remove dressing  this evening or tomorrow and leave open to air at home- can cover if in a dirty environment  Return  anytime if increase in local pain, increase redness or red streak forming or if increase swelling or drainage noted

## 2024-05-04 NOTE — PROGRESS NOTES
Maryuri Reveles (:  1990) is a 33 y.o. female,{New vs Established:[id number]}, here for evaluation of the following chief complaint(s):  Finger Laceration (Left index)      Assessment & Plan :  Visit Diagnoses and Associated Orders     Laceration of left index finger without foreign body without damage to nail, initial encounter    -  Primary               • Follow up in *** days if symptoms persist or if symptoms worsen.       Subjective :  HPI     33 y.o. female presents with symptoms of ***         Vitals:    24 1551   BP: (!) 137/90   Site: Right Upper Arm   Position: Sitting   Cuff Size: Large Adult   Pulse: 88   Resp: 16   Temp: 98.3 °F (36.8 °C)   TempSrc: Oral   SpO2: 98%   Weight: 88.5 kg (195 lb)   Height: 1.626 m (5' 4\")       No results found for this visit on 24.      Objective   Physical Exam          An electronic signature was used to authenticate this note.    Judah Lockett MD

## 2024-05-24 ENCOUNTER — APPOINTMENT (OUTPATIENT)
Dept: GENERAL RADIOLOGY | Age: 34
End: 2024-05-24
Payer: OTHER MISCELLANEOUS

## 2024-05-24 ENCOUNTER — HOSPITAL ENCOUNTER (EMERGENCY)
Age: 34
Discharge: HOME | End: 2024-05-25
Payer: OTHER MISCELLANEOUS

## 2024-05-24 DIAGNOSIS — M25.571 ACUTE BILATERAL ANKLE PAIN: ICD-10-CM

## 2024-05-24 DIAGNOSIS — M25.552 BILATERAL HIP PAIN: ICD-10-CM
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
## 2024-05-24 DIAGNOSIS — M25.562 ACUTE PAIN OF LEFT KNEE: ICD-10-CM

## 2024-05-24 DIAGNOSIS — M25.572 ACUTE BILATERAL ANKLE PAIN: ICD-10-CM

## 2024-05-24 DIAGNOSIS — M25.551 BILATERAL HIP PAIN: ICD-10-CM

## 2024-05-24 DIAGNOSIS — M79.641 HAND PAIN, RIGHT: ICD-10-CM

## 2024-05-24 PROCEDURE — 99284 EMERGENCY DEPT VISIT MOD MDM: CPT

## 2024-05-24 PROCEDURE — 73610 X-RAY EXAM OF ANKLE: CPT

## 2024-05-24 PROCEDURE — 73521 X-RAY EXAM HIPS BI 2 VIEWS: CPT

## 2024-05-24 PROCEDURE — 73560 X-RAY EXAM OF KNEE 1 OR 2: CPT

## 2024-05-24 PROCEDURE — 6370000000 HC RX 637 (ALT 250 FOR IP): Performed by: PHYSICIAN ASSISTANT

## 2024-05-24 PROCEDURE — 73110 X-RAY EXAM OF WRIST: CPT

## 2024-05-24 PROCEDURE — 6360000002 HC RX W HCPCS: Performed by: PHYSICIAN ASSISTANT

## 2024-05-24 PROCEDURE — 96372 THER/PROPH/DIAG INJ SC/IM: CPT

## 2024-05-24 RX ORDER — KETOROLAC TROMETHAMINE 30 MG/ML
30 INJECTION, SOLUTION INTRAMUSCULAR; INTRAVENOUS ONCE
Status: COMPLETED | OUTPATIENT
Start: 2024-05-24 | End: 2024-05-24

## 2024-05-24 RX ORDER — ACETAMINOPHEN 500 MG
1000 TABLET ORAL ONCE
Status: COMPLETED | OUTPATIENT
Start: 2024-05-24 | End: 2024-05-24

## 2024-05-24 RX ADMIN — ACETAMINOPHEN 1000 MG: 500 TABLET ORAL at 23:15

## 2024-05-24 RX ADMIN — KETOROLAC TROMETHAMINE 30 MG: 30 INJECTION, SOLUTION INTRAMUSCULAR at 23:15

## 2024-05-24 ASSESSMENT — LIFESTYLE VARIABLES
HOW OFTEN DO YOU HAVE A DRINK CONTAINING ALCOHOL: MONTHLY OR LESS
HOW MANY STANDARD DRINKS CONTAINING ALCOHOL DO YOU HAVE ON A TYPICAL DAY: 1 OR 2

## 2024-05-24 ASSESSMENT — PAIN DESCRIPTION - DESCRIPTORS: DESCRIPTORS: ACHING

## 2024-05-24 ASSESSMENT — PAIN - FUNCTIONAL ASSESSMENT: PAIN_FUNCTIONAL_ASSESSMENT: 0-10

## 2024-05-24 ASSESSMENT — PAIN DESCRIPTION - LOCATION: LOCATION: ARM

## 2024-05-24 ASSESSMENT — PAIN DESCRIPTION - PAIN TYPE: TYPE: ACUTE PAIN

## 2024-05-24 ASSESSMENT — PAIN SCALES - GENERAL: PAINLEVEL_OUTOF10: 6

## 2024-05-24 ASSESSMENT — PAIN DESCRIPTION - ORIENTATION: ORIENTATION: RIGHT

## 2024-05-25 VITALS
SYSTOLIC BLOOD PRESSURE: 124 MMHG | BODY MASS INDEX: 37.56 KG/M2 | WEIGHT: 220 LBS | HEART RATE: 78 BPM | RESPIRATION RATE: 15 BRPM | HEIGHT: 64 IN | TEMPERATURE: 98.8 F | DIASTOLIC BLOOD PRESSURE: 81 MMHG | OXYGEN SATURATION: 98 %

## 2024-05-25 ASSESSMENT — PAIN SCALES - GENERAL: PAINLEVEL_OUTOF10: 6

## 2024-05-25 ASSESSMENT — PAIN DESCRIPTION - LOCATION: LOCATION: ANKLE

## 2024-05-27 NOTE — ED PROVIDER NOTES
initial encounter    2. Hand pain, right    3. Bilateral hip pain    4. Acute pain of left knee    5. Acute bilateral ankle pain        Blood pressure 124/81, pulse 78, temperature 98.8 °F (37.1 °C), temperature source Oral, resp. rate 15, height 1.626 m (5' 4\"), weight 99.8 kg (220 lb), SpO2 98 %, not currently breastfeeding.     DISPOSITION  Patient was discharged to home in good condition.         Adithya Ward PA-C  05/27/24 0000

## 2024-05-29 DIAGNOSIS — A60.00 RECURRENT GENITAL HSV (HERPES SIMPLEX VIRUS) INFECTION: ICD-10-CM

## 2024-05-29 RX ORDER — VALACYCLOVIR HYDROCHLORIDE 500 MG/1
500 TABLET, FILM COATED ORAL DAILY
Qty: 90 TABLET | Refills: 0 | Status: SHIPPED | OUTPATIENT
Start: 2024-05-29

## 2024-05-29 NOTE — TELEPHONE ENCOUNTER
Spoke with pt and she DID request the refill,pap is scheduled for July 2024.  please sign or advise

## 2024-07-17 ENCOUNTER — OFFICE VISIT (OUTPATIENT)
Dept: OBGYN CLINIC | Age: 34
End: 2024-07-17
Payer: COMMERCIAL

## 2024-07-17 VITALS
DIASTOLIC BLOOD PRESSURE: 70 MMHG | WEIGHT: 239.4 LBS | SYSTOLIC BLOOD PRESSURE: 132 MMHG | TEMPERATURE: 98.6 F | HEART RATE: 84 BPM | BODY MASS INDEX: 41.09 KG/M2

## 2024-07-17 DIAGNOSIS — Z30.41 ENCOUNTER FOR SURVEILLANCE OF CONTRACEPTIVE PILLS: ICD-10-CM

## 2024-07-17 DIAGNOSIS — A60.00 RECURRENT GENITAL HSV (HERPES SIMPLEX VIRUS) INFECTION: ICD-10-CM

## 2024-07-17 DIAGNOSIS — Z01.419 WOMEN'S ANNUAL ROUTINE GYNECOLOGICAL EXAMINATION: Primary | ICD-10-CM

## 2024-07-17 DIAGNOSIS — N92.0 MENORRHAGIA WITH REGULAR CYCLE: ICD-10-CM

## 2024-07-17 DIAGNOSIS — Z12.39 SCREENING BREAST EXAMINATION: ICD-10-CM

## 2024-07-17 PROCEDURE — 99395 PREV VISIT EST AGE 18-39: CPT | Performed by: OBSTETRICS & GYNECOLOGY

## 2024-07-17 RX ORDER — LEVONORGESTREL / ETHINYL ESTRADIOL AND ETHINYL ESTRADIOL 150-30(84)
1 KIT ORAL DAILY
Qty: 3 PACKET | Refills: 3 | Status: SHIPPED | OUTPATIENT
Start: 2024-07-17

## 2024-07-17 RX ORDER — VALACYCLOVIR HYDROCHLORIDE 500 MG/1
500 TABLET, FILM COATED ORAL DAILY
Qty: 90 TABLET | Refills: 3 | Status: SHIPPED | OUTPATIENT
Start: 2024-07-17

## 2024-07-17 NOTE — PROGRESS NOTES
SURGERY      tear ducts    WRIST GANGLION EXCISION         Allergies:  Allergies   Allergen Reactions    Macadamia Nut Oil Hives, Itching and Anaphylaxis    Other Anaphylaxis and Hives     Tree nuts    Penicillins Rash and Hives    Egg White (Egg Protein) Other (See Comments)    Erythromycin      Other reaction(s): Loss of Consciousness    Gluten     Keppra [Levetiracetam]      Extreme rage    Levaquin [Levofloxacin]      Nerve and muscle issues    Lexapro [Escitalopram]     Peanut (Diagnostic) Other (See Comments)    Wellbutrin [Bupropion] Other (See Comments)     Suicidal ideation    Azithromycin Rash       Family History:  Family History   Problem Relation Age of Onset    Diabetes Father     High Blood Pressure Father     Heart Disease Father     High Blood Pressure Paternal Grandmother     Diabetes Paternal Grandmother     Stroke Paternal Grandmother     Depression Paternal Grandmother     Dementia Paternal Grandmother     Heart Disease Paternal Grandmother        Social History:  Social History     Socioeconomic History    Marital status:      Spouse name: Katy    Number of children: 4    Years of education: None    Highest education level: None   Occupational History    Occupation:    Tobacco Use    Smoking status: Every Day     Types: E-Cigarettes    Smokeless tobacco: Never   Vaping Use    Vaping Use: Every day    Substances: Nicotine, Flavoring, D-8    Substance and Sexual Activity    Alcohol use: Yes     Comment: rarely    Drug use: Yes     Types: Marijuana (Weed)     Comment: topical medical marijuana occasionally    Sexual activity: Yes     Partners: Female     Social Determinants of Health     Financial Resource Strain: Low Risk  (6/30/2022)    Overall Financial Resource Strain (CARDIA)     Difficulty of Paying Living Expenses: Not hard at all   Food Insecurity: No Food Insecurity (6/30/2022)    Hunger Vital Sign     Worried About Running Out of Food in the Last Year: Never true     Ran

## 2024-07-23 ENCOUNTER — HOSPITAL ENCOUNTER (EMERGENCY)
Age: 34
Discharge: HOME | End: 2024-07-23
Attending: STUDENT IN AN ORGANIZED HEALTH CARE EDUCATION/TRAINING PROGRAM
Payer: COMMERCIAL

## 2024-07-23 VITALS
HEART RATE: 90 BPM | WEIGHT: 239 LBS | OXYGEN SATURATION: 99 % | DIASTOLIC BLOOD PRESSURE: 90 MMHG | SYSTOLIC BLOOD PRESSURE: 147 MMHG | RESPIRATION RATE: 18 BRPM | TEMPERATURE: 99.6 F | HEIGHT: 64 IN | BODY MASS INDEX: 40.8 KG/M2

## 2024-07-23 DIAGNOSIS — T78.40XA ALLERGIC REACTION, INITIAL ENCOUNTER: Primary | ICD-10-CM

## 2024-07-23 DIAGNOSIS — R07.89 CHEST TIGHTNESS: ICD-10-CM

## 2024-07-23 PROCEDURE — 99284 EMERGENCY DEPT VISIT MOD MDM: CPT

## 2024-07-23 PROCEDURE — 6360000002 HC RX W HCPCS: Performed by: STUDENT IN AN ORGANIZED HEALTH CARE EDUCATION/TRAINING PROGRAM

## 2024-07-23 PROCEDURE — 6370000000 HC RX 637 (ALT 250 FOR IP): Performed by: STUDENT IN AN ORGANIZED HEALTH CARE EDUCATION/TRAINING PROGRAM

## 2024-07-23 PROCEDURE — 96372 THER/PROPH/DIAG INJ SC/IM: CPT

## 2024-07-23 PROCEDURE — 93005 ELECTROCARDIOGRAM TRACING: CPT | Performed by: STUDENT IN AN ORGANIZED HEALTH CARE EDUCATION/TRAINING PROGRAM

## 2024-07-23 RX ORDER — PREDNISONE 10 MG/1
TABLET ORAL
Qty: 20 TABLET | Refills: 0 | Status: SHIPPED | OUTPATIENT
Start: 2024-07-23 | End: 2024-08-02

## 2024-07-23 RX ORDER — FAMOTIDINE 20 MG/1
20 TABLET, FILM COATED ORAL ONCE
Status: COMPLETED | OUTPATIENT
Start: 2024-07-23 | End: 2024-07-23

## 2024-07-23 RX ORDER — DEXAMETHASONE SODIUM PHOSPHATE 10 MG/ML
10 INJECTION INTRAMUSCULAR; INTRAVENOUS ONCE
Status: COMPLETED | OUTPATIENT
Start: 2024-07-23 | End: 2024-07-23

## 2024-07-23 RX ORDER — EPINEPHRINE 0.3 MG/.3ML
0.3 INJECTION SUBCUTANEOUS ONCE
Qty: 0.3 ML | Refills: 0 | Status: SHIPPED | OUTPATIENT
Start: 2024-07-23 | End: 2024-07-23

## 2024-07-23 RX ADMIN — DEXAMETHASONE SODIUM PHOSPHATE 10 MG: 10 INJECTION INTRAMUSCULAR; INTRAVENOUS at 20:21

## 2024-07-23 RX ADMIN — FAMOTIDINE 20 MG: 20 TABLET, FILM COATED ORAL at 20:22

## 2024-07-23 ASSESSMENT — ENCOUNTER SYMPTOMS
CHEST TIGHTNESS: 1
SHORTNESS OF BREATH: 1

## 2024-07-23 NOTE — ED PROVIDER NOTES
Emergency Department Provider Note  Location: [facility]  ED  7/23/2024     Patient Identification  Maryuri Reveles is a 33 y.o. female    Chief Complaint  Allergic Reaction (Pt reports she ate almonds around 9am. Pt states he started to feel like she was having an allergic reaction at 9:02am. Pt states she has been taking 25mg of Benadryl every 4 hours. Pt states her throat feels tight.  Pt also states she has pots )      Mode of Arrival  private car    HPI  (History provided by patient)  This is a 33 y.o. female with a PMH significant for asthma  presented today for allergic reaction. Patient reports she accidentally ingested almonds in a protein bar which she is allergic to this morning around 9am. States she has been taking benadryl 25mg q4 hrs with no relief. Endorsing chest tightness and the feeling her throat is closing. Reports nausea but denies any vomiting. Endorsing shortness of breath with symptoms. Denies any abdominal pain. Last dose of medication was at 7:45pm. Denies any hives.       ROS  Review of Systems   Respiratory:  Positive for chest tightness and shortness of breath.    All other systems reviewed and are negative.        I have reviewed the following nursing documentation:  Allergies:   Allergies   Allergen Reactions    Macadamia Nut Oil Hives, Itching and Anaphylaxis    Other Anaphylaxis and Hives     Tree nuts    Penicillins Rash and Hives    Egg White (Egg Protein) Other (See Comments)    Erythromycin      Other reaction(s): Loss of Consciousness    Gluten     Keppra [Levetiracetam]      Extreme rage    Levaquin [Levofloxacin]      Nerve and muscle issues    Lexapro [Escitalopram]     Peanut (Diagnostic) Other (See Comments)    Wellbutrin [Bupropion] Other (See Comments)     Suicidal ideation    Azithromycin Rash       Past medical history:  has a past medical history of ADHD (attention deficit hyperactivity disorder), Anemia, Anxiety, Asthma, Autism, Autoimmune disorder

## 2024-07-24 LAB
EKG ATRIAL RATE: 97 BPM
EKG DIAGNOSIS: NORMAL
EKG P AXIS: 62 DEGREES
EKG P-R INTERVAL: 206 MS
EKG Q-T INTERVAL: 360 MS
EKG QRS DURATION: 86 MS
EKG QTC CALCULATION (BAZETT): 457 MS
EKG R AXIS: 74 DEGREES
EKG T AXIS: 44 DEGREES
EKG VENTRICULAR RATE: 97 BPM

## 2024-07-24 PROCEDURE — 93010 ELECTROCARDIOGRAM REPORT: CPT | Performed by: INTERNAL MEDICINE

## 2024-07-24 NOTE — DISCHARGE INSTRUCTIONS
Please return to the emergency department if you develop any new, ongoing, or worsening symptoms. We hope you feel better soon!

## 2024-08-01 ENCOUNTER — HOSPITAL ENCOUNTER (EMERGENCY)
Age: 34
Discharge: HOME | End: 2024-08-02
Attending: STUDENT IN AN ORGANIZED HEALTH CARE EDUCATION/TRAINING PROGRAM
Payer: COMMERCIAL

## 2024-08-01 DIAGNOSIS — N17.9 AKI (ACUTE KIDNEY INJURY) (HCC): ICD-10-CM

## 2024-08-01 DIAGNOSIS — G40.919 BREAKTHROUGH SEIZURE (HCC): Primary | ICD-10-CM

## 2024-08-01 LAB
ALBUMIN SERPL-MCNC: 3.9 G/DL (ref 3.4–5)
ALBUMIN/GLOB SERPL: 1.3 {RATIO} (ref 1.1–2.2)
ALP SERPL-CCNC: 60 U/L (ref 40–129)
ALT SERPL-CCNC: 14 U/L (ref 10–40)
ANION GAP SERPL CALCULATED.3IONS-SCNC: 10 MMOL/L (ref 3–16)
ANION GAP SERPL CALCULATED.3IONS-SCNC: 13 MMOL/L (ref 3–16)
ANION GAP SERPL CALCULATED.3IONS-SCNC: 9 MMOL/L (ref 3–16)
AST SERPL-CCNC: 14 U/L (ref 15–37)
BASOPHILS # BLD: 0 K/UL (ref 0–0.2)
BASOPHILS NFR BLD: 0.4 %
BILIRUB SERPL-MCNC: <0.2 MG/DL (ref 0–1)
BILIRUB UR QL STRIP.AUTO: NEGATIVE
BUN SERPL-MCNC: 9 MG/DL (ref 7–20)
CALCIUM SERPL-MCNC: 8.3 MG/DL (ref 8.3–10.6)
CALCIUM SERPL-MCNC: 8.7 MG/DL (ref 8.3–10.6)
CALCIUM SERPL-MCNC: 8.7 MG/DL (ref 8.3–10.6)
CHLORIDE SERPL-SCNC: 103 MMOL/L (ref 99–110)
CHLORIDE SERPL-SCNC: 103 MMOL/L (ref 99–110)
CHLORIDE SERPL-SCNC: 106 MMOL/L (ref 99–110)
CLARITY UR: CLEAR
CO2 SERPL-SCNC: 23 MMOL/L (ref 21–32)
CO2 SERPL-SCNC: 26 MMOL/L (ref 21–32)
CO2 SERPL-SCNC: 26 MMOL/L (ref 21–32)
COLOR UR: YELLOW
CREAT SERPL-MCNC: 1.2 MG/DL (ref 0.6–1.1)
CREAT SERPL-MCNC: 1.3 MG/DL (ref 0.6–1.1)
CREAT SERPL-MCNC: 1.5 MG/DL (ref 0.6–1.1)
DEPRECATED RDW RBC AUTO: 13.8 % (ref 12.4–15.4)
EOSINOPHIL # BLD: 0.1 K/UL (ref 0–0.6)
EOSINOPHIL NFR BLD: 1.3 %
GFR SERPLBLD CREATININE-BSD FMLA CKD-EPI: 46 ML/MIN/{1.73_M2}
GFR SERPLBLD CREATININE-BSD FMLA CKD-EPI: 55 ML/MIN/{1.73_M2}
GFR SERPLBLD CREATININE-BSD FMLA CKD-EPI: 60 ML/MIN/{1.73_M2}
GLUCOSE BLD-MCNC: 139 MG/DL (ref 70–99)
GLUCOSE SERPL-MCNC: 111 MG/DL (ref 70–99)
GLUCOSE SERPL-MCNC: 118 MG/DL (ref 70–99)
GLUCOSE SERPL-MCNC: 124 MG/DL (ref 70–99)
GLUCOSE UR STRIP.AUTO-MCNC: NEGATIVE MG/DL
HCG SERPL QL: NEGATIVE
HCT VFR BLD AUTO: 35.8 % (ref 36–48)
HGB BLD-MCNC: 11.9 G/DL (ref 12–16)
HGB UR QL STRIP.AUTO: NEGATIVE
KETONES UR STRIP.AUTO-MCNC: NEGATIVE MG/DL
LEUKOCYTE ESTERASE UR QL STRIP.AUTO: NEGATIVE
LYMPHOCYTES # BLD: 2.2 K/UL (ref 1–5.1)
LYMPHOCYTES NFR BLD: 19.6 %
MAGNESIUM SERPL-MCNC: 2.2 MG/DL (ref 1.8–2.4)
MCH RBC QN AUTO: 29.1 PG (ref 26–34)
MCHC RBC AUTO-ENTMCNC: 33.3 G/DL (ref 31–36)
MCV RBC AUTO: 87.4 FL (ref 80–100)
MONOCYTES # BLD: 0.7 K/UL (ref 0–1.3)
MONOCYTES NFR BLD: 6.1 %
NEUTROPHILS # BLD: 8.2 K/UL (ref 1.7–7.7)
NEUTROPHILS NFR BLD: 72.6 %
NITRITE UR QL STRIP.AUTO: NEGATIVE
PERFORMED ON: ABNORMAL
PH UR STRIP.AUTO: 7 [PH] (ref 5–8)
PLATELET # BLD AUTO: 257 K/UL (ref 135–450)
PMV BLD AUTO: 9 FL (ref 5–10.5)
POTASSIUM SERPL-SCNC: 3.3 MMOL/L (ref 3.5–5.1)
POTASSIUM SERPL-SCNC: 3.5 MMOL/L (ref 3.5–5.1)
POTASSIUM SERPL-SCNC: 3.6 MMOL/L (ref 3.5–5.1)
PROT SERPL-MCNC: 6.9 G/DL (ref 6.4–8.2)
PROT UR STRIP.AUTO-MCNC: NEGATIVE MG/DL
RBC # BLD AUTO: 4.1 M/UL (ref 4–5.2)
SODIUM SERPL-SCNC: 139 MMOL/L (ref 136–145)
SODIUM SERPL-SCNC: 139 MMOL/L (ref 136–145)
SODIUM SERPL-SCNC: 141 MMOL/L (ref 136–145)
SP GR UR STRIP.AUTO: 1.02 (ref 1–1.03)
UA COMPLETE W REFLEX CULTURE PNL UR: NORMAL
UA DIPSTICK W REFLEX MICRO PNL UR: NORMAL
URN SPEC COLLECT METH UR: NORMAL
UROBILINOGEN UR STRIP-ACNC: 0.2 E.U./DL
WBC # BLD AUTO: 11.3 K/UL (ref 4–11)

## 2024-08-01 PROCEDURE — 96361 HYDRATE IV INFUSION ADD-ON: CPT

## 2024-08-01 PROCEDURE — 80053 COMPREHEN METABOLIC PANEL: CPT

## 2024-08-01 PROCEDURE — 85025 COMPLETE CBC W/AUTO DIFF WBC: CPT

## 2024-08-01 PROCEDURE — 80201 ASSAY OF TOPIRAMATE: CPT

## 2024-08-01 PROCEDURE — 99284 EMERGENCY DEPT VISIT MOD MDM: CPT

## 2024-08-01 PROCEDURE — 36415 COLL VENOUS BLD VENIPUNCTURE: CPT

## 2024-08-01 PROCEDURE — 84703 CHORIONIC GONADOTROPIN ASSAY: CPT

## 2024-08-01 PROCEDURE — 81003 URINALYSIS AUTO W/O SCOPE: CPT

## 2024-08-01 PROCEDURE — 96360 HYDRATION IV INFUSION INIT: CPT

## 2024-08-01 PROCEDURE — 2580000003 HC RX 258: Performed by: STUDENT IN AN ORGANIZED HEALTH CARE EDUCATION/TRAINING PROGRAM

## 2024-08-01 PROCEDURE — 82077 ASSAY SPEC XCP UR&BREATH IA: CPT

## 2024-08-01 PROCEDURE — 83735 ASSAY OF MAGNESIUM: CPT

## 2024-08-01 PROCEDURE — 6370000000 HC RX 637 (ALT 250 FOR IP): Performed by: STUDENT IN AN ORGANIZED HEALTH CARE EDUCATION/TRAINING PROGRAM

## 2024-08-01 PROCEDURE — 80183 DRUG SCRN QUANT OXCARBAZEPIN: CPT

## 2024-08-01 RX ORDER — 0.9 % SODIUM CHLORIDE 0.9 %
1000 INTRAVENOUS SOLUTION INTRAVENOUS ONCE
Status: COMPLETED | OUTPATIENT
Start: 2024-08-01 | End: 2024-08-02

## 2024-08-01 RX ORDER — 0.9 % SODIUM CHLORIDE 0.9 %
1000 INTRAVENOUS SOLUTION INTRAVENOUS ONCE
Status: COMPLETED | OUTPATIENT
Start: 2024-08-01 | End: 2024-08-01

## 2024-08-01 RX ORDER — ACETAMINOPHEN 500 MG
1000 TABLET ORAL ONCE
Status: COMPLETED | OUTPATIENT
Start: 2024-08-01 | End: 2024-08-01

## 2024-08-01 RX ADMIN — ACETAMINOPHEN 1000 MG: 500 TABLET ORAL at 21:59

## 2024-08-01 RX ADMIN — SODIUM CHLORIDE 1000 ML: 9 INJECTION, SOLUTION INTRAVENOUS at 21:34

## 2024-08-01 RX ADMIN — SODIUM CHLORIDE 1000 ML: 9 INJECTION, SOLUTION INTRAVENOUS at 23:46

## 2024-08-01 ASSESSMENT — PAIN SCALES - GENERAL
PAINLEVEL_OUTOF10: 4
PAINLEVEL_OUTOF10: 4

## 2024-08-01 ASSESSMENT — PAIN DESCRIPTION - LOCATION: LOCATION: HEAD

## 2024-08-02 VITALS
TEMPERATURE: 98.4 F | SYSTOLIC BLOOD PRESSURE: 115 MMHG | HEIGHT: 64 IN | BODY MASS INDEX: 32.44 KG/M2 | DIASTOLIC BLOOD PRESSURE: 72 MMHG | OXYGEN SATURATION: 96 % | WEIGHT: 190 LBS | HEART RATE: 79 BPM | RESPIRATION RATE: 17 BRPM

## 2024-08-02 LAB
ANION GAP SERPL CALCULATED.3IONS-SCNC: 10 MMOL/L (ref 3–16)
ANION GAP SERPL CALCULATED.3IONS-SCNC: 13 MMOL/L (ref 3–16)
BUN SERPL-MCNC: 10 MG/DL (ref 7–20)
BUN SERPL-MCNC: 8 MG/DL (ref 7–20)
CALCIUM SERPL-MCNC: 5.9 MG/DL (ref 8.3–10.6)
CALCIUM SERPL-MCNC: 8.1 MG/DL (ref 8.3–10.6)
CHLORIDE SERPL-SCNC: 106 MMOL/L (ref 99–110)
CHLORIDE SERPL-SCNC: 114 MMOL/L (ref 99–110)
CO2 SERPL-SCNC: 17 MMOL/L (ref 21–32)
CO2 SERPL-SCNC: 19 MMOL/L (ref 21–32)
CREAT SERPL-MCNC: 1 MG/DL (ref 0.6–1.1)
CREAT SERPL-MCNC: 1.3 MG/DL (ref 0.6–1.1)
ETHANOLAMINE SERPL-MCNC: NORMAL MG/DL (ref 0–0.08)
GFR SERPLBLD CREATININE-BSD FMLA CKD-EPI: 55 ML/MIN/{1.73_M2}
GFR SERPLBLD CREATININE-BSD FMLA CKD-EPI: 75 ML/MIN/{1.73_M2}
GLUCOSE SERPL-MCNC: 133 MG/DL (ref 70–99)
GLUCOSE SERPL-MCNC: 94 MG/DL (ref 70–99)
POTASSIUM SERPL-SCNC: 2.9 MMOL/L (ref 3.5–5.1)
POTASSIUM SERPL-SCNC: 4.5 MMOL/L (ref 3.5–5.1)
SODIUM SERPL-SCNC: 136 MMOL/L (ref 136–145)
SODIUM SERPL-SCNC: 143 MMOL/L (ref 136–145)

## 2024-08-02 PROCEDURE — 36415 COLL VENOUS BLD VENIPUNCTURE: CPT

## 2024-08-02 PROCEDURE — 80048 BASIC METABOLIC PNL TOTAL CA: CPT

## 2024-08-02 ASSESSMENT — LIFESTYLE VARIABLES
HOW MANY STANDARD DRINKS CONTAINING ALCOHOL DO YOU HAVE ON A TYPICAL DAY: PATIENT DOES NOT DRINK
HOW OFTEN DO YOU HAVE A DRINK CONTAINING ALCOHOL: NEVER

## 2024-08-02 NOTE — ED PROVIDER NOTES
Value Ref Range    Sodium 136 136 - 145 mmol/L    Potassium 4.5 3.5 - 5.1 mmol/L    Chloride 106 99 - 110 mmol/L    CO2 17 (L) 21 - 32 mmol/L    Anion Gap 13 3 - 16    Glucose 133 (H) 70 - 99 mg/dL    BUN 10 7 - 20 mg/dL    Creatinine 1.3 (H) 0.6 - 1.1 mg/dL    Est, Glom Filt Rate 55 (A) >60    Calcium 8.1 (L) 8.3 - 10.6 mg/dL   POCT Glucose   Result Value Ref Range    POC Glucose 139 (H) 70 - 99 mg/dl    Performed on ACCU-CHEK        EMERGENCY DEPARTMENT COURSE and DIFFERENTIAL DIAGNOSIS/MDM:   Patient seen and evaluated. Old records reviewed and pertinent information included in HPI. Labs and imaging reviewed and results discussed with patient.      Overall well appearing patient, in no acute distress, presenting for breakthrough seizure.  History obtained from patient.  Physical exam remarkable for no neurologic deficits.     Patient's pertinent external medical records: Records Reviewed : None    Chronic Medical Conditions that may contribute to presentation today:  has a past medical history of ADHD (attention deficit hyperactivity disorder), Anemia, Anxiety, Asthma, Autism, Autoimmune disorder (HCC), Chronic back pain, Depression, Johnie-Danlos syndrome, Migraine, Neurologic disorder, Postpartum depression,  delivery,  labor, Seizures (HCC), and Trauma.     Social Determinants affecting Dx or Tx:    Social History     Socioeconomic History    Marital status:      Spouse name: Katy    Number of children: 4    Years of education: Not on file    Highest education level: Not on file   Occupational History    Occupation:    Tobacco Use    Smoking status: Every Day     Types: E-Cigarettes    Smokeless tobacco: Never   Vaping Use    Vaping Use: Every day    Substances: Nicotine, Flavoring, D-8    Substance and Sexual Activity    Alcohol use: Yes     Comment: rarely    Drug use: Yes     Types: Marijuana (Weed)     Comment: topical medical marijuana occasionally    Sexual activity: Yes     weakness, vomiting, fever) that necessitate immediate return.    Vitals:    Vitals:    08/02/24 0130 08/02/24 0142 08/02/24 0230 08/02/24 0300   BP: 133/75  106/62 115/72   Pulse: 79  76 79   Resp: 24 22 17   Temp:       SpO2: 96%  96% 96%   Weight:  86.2 kg (190 lb)     Height:  1.626 m (5' 4\")         CRITICAL CARE TIME     I personally spent a total of 0 minutes of critical care time in obtaining history, performing a physical exam, bedside monitoring of interventions, collecting and interpreting tests and discussion with consultants but excluding time spent performing procedures, treating other patients and teaching time.                   FINAL IMPRESSION      1. Breakthrough seizure (HCC)    2. HUGO (acute kidney injury) (HCC)          DISPOSITION/PLAN   Disposition: DISPOSITION Decision To Discharge 08/02/2024 02:57:43 AM    Condition: stable     DISCHARGE MEDICATIONS:  Patient was given scripts for the following medications. I counseled patient how to take these medications:  Discharge Medication List as of 8/2/2024  3:01 AM          DISCONTINUED MEDICATIONS:  Discharge Medication List as of 8/2/2024  3:01 AM          FOLLOW UP:  Your neurologist    Call today      [facility]  ED  [address]  [facility] [address] [phone]  Go to   As needed, If symptoms worsen    [facility]  [address]  [address]  [facility] [address]  [phone]        Russ Marsh MD  [address]  [place] [address]  [phone]            DISCLAIMER: This chart was created using Dragon dictation software.  Efforts were made by me to ensure accuracy, however some errors may be present due to limitations of this technology and occasionally words are not transcribed correctly.    MD Leland Mcpherson Erica J, MD  08/04/24 0332

## 2024-08-02 NOTE — DISCHARGE INSTRUCTIONS
Please continue to take your antiseizure medications.  Follow-up with your neurologist.  Your kidney function numbers were mildly elevated in the emergency department.  Please follow-up with your primary care doctor for a recheck on Monday.  Please return the emergency department you have any new or worsening symptoms

## 2024-08-04 LAB — TOPIRAMATE SERPL-MCNC: <1.5 UG/ML (ref 5–20)

## 2024-08-05 LAB — 10OH-CARBAZEPINE SERPL-MCNC: 3 UG/ML (ref 3–35)

## 2024-11-01 ENCOUNTER — OFFICE VISIT (OUTPATIENT)
Age: 34
End: 2024-11-01

## 2024-11-01 VITALS
HEART RATE: 109 BPM | OXYGEN SATURATION: 98 % | DIASTOLIC BLOOD PRESSURE: 80 MMHG | TEMPERATURE: 98 F | SYSTOLIC BLOOD PRESSURE: 116 MMHG

## 2024-11-01 DIAGNOSIS — H65.91 RIGHT NON-SUPPURATIVE OTITIS MEDIA: ICD-10-CM

## 2024-11-01 DIAGNOSIS — J01.01 ACUTE RECURRENT MAXILLARY SINUSITIS: Primary | ICD-10-CM

## 2024-11-01 RX ORDER — CEFDINIR 300 MG/1
300 CAPSULE ORAL 2 TIMES DAILY
Qty: 20 CAPSULE | Refills: 0 | Status: SHIPPED | OUTPATIENT
Start: 2024-11-01 | End: 2024-11-11

## 2024-11-01 RX ORDER — FLUTICASONE PROPIONATE 50 MCG
2 SPRAY, SUSPENSION (ML) NASAL DAILY
Qty: 48 G | Refills: 1 | Status: SHIPPED | OUTPATIENT
Start: 2024-11-01

## 2024-11-01 ASSESSMENT — ENCOUNTER SYMPTOMS
SINUS PRESSURE: 1
COUGH: 0
SORE THROAT: 0
TROUBLE SWALLOWING: 0
VOMITING: 0
SHORTNESS OF BREATH: 0
NAUSEA: 0
SINUS PAIN: 1
ABDOMINAL PAIN: 0

## 2024-11-01 NOTE — PROGRESS NOTES
Maryuri Reveles (:  1990) is a 34 y.o. female,Established patient, here for evaluation of the following chief complaint(s):  Sinusitis (Losing her voice, Drainage off and on, states that she does have sinus pressure and cracking in her ears , X couple days )      ASSESSMENT/PLAN:    ICD-10-CM    1. Acute recurrent maxillary sinusitis  J01.01 cefdinir (OMNICEF) 300 MG capsule     fluticasone (FLONASE) 50 MCG/ACT nasal spray      2. Right non-suppurative otitis media  H65.91 cefdinir (OMNICEF) 300 MG capsule     fluticasone (FLONASE) 50 MCG/ACT nasal spray          Take medication as prescribed.   Rest and Increase fluids.  Try taking a daily antihistamine such as Claritin or Zyrtec.     May want to revisit your ENT if you find these infections are more recurrent.   If any worsening of symptoms go to ER for further workup and assessment.     The patient tolerated their visit well. The patient and/or the family were informed of the results of any tests, a time was given to answer questions, a plan was proposed and they agreed with plan. Reviewed AVS with treatment instructions and answered questions - pt/family expresses understanding and agreement with the discussed treatment plan and AVS instructions.       SUBJECTIVE/OBJECTIVE:    History provided by:  Patient   used: No    Sinusitis  Associated symptoms include congestion, ear pain and sinus pressure. Pertinent negatives include no coughing, headaches, shortness of breath or sore throat.     HPI:   34 y.o. female presents with symptoms of congestion,drainage, sinus pressure, ear pain and cracking ongoing since last 2 days, started about 1-2 weeks ago. Denies fever, SOB, body aches. Has taken otc meds for symptoms so far without relief.     Vitals:    24 1424   BP: 116/80   Pulse: (!) 109   Temp: 98 °F (36.7 °C)   TempSrc: Temporal   SpO2: 98%       Review of Systems   Constitutional:  Negative for fatigue and fever.   HENT:

## 2024-11-01 NOTE — PATIENT INSTRUCTIONS
Take medication as prescribed.   Rest and Increase fluids.  Try taking a daily antihistamine such as Claritin or Zyrtec.     May want to revisit your ENT if you find these infections are more recurrent.   If any worsening of symptoms go to ER for further workup and assessment.     The patient tolerated their visit well. The patient and/or the family were informed of the results of any tests, a time was given to answer questions, a plan was proposed and they agreed with plan. Reviewed AVS with treatment instructions and answered questions - pt/family expresses understanding and agreement with the discussed treatment plan and AVS instructions.

## 2024-11-12 ENCOUNTER — APPOINTMENT (OUTPATIENT)
Dept: GENERAL RADIOLOGY | Age: 34
End: 2024-11-12
Payer: COMMERCIAL

## 2024-11-12 ENCOUNTER — HOSPITAL ENCOUNTER (EMERGENCY)
Age: 34
Discharge: HOME | End: 2024-11-12
Payer: COMMERCIAL

## 2024-11-12 VITALS
BODY MASS INDEX: 33.29 KG/M2 | TEMPERATURE: 98.5 F | DIASTOLIC BLOOD PRESSURE: 97 MMHG | HEART RATE: 82 BPM | HEIGHT: 64 IN | RESPIRATION RATE: 15 BRPM | OXYGEN SATURATION: 98 % | WEIGHT: 195 LBS | SYSTOLIC BLOOD PRESSURE: 141 MMHG

## 2024-11-12 DIAGNOSIS — M79.602 LEFT ARM PAIN: Primary | ICD-10-CM

## 2024-11-12 PROCEDURE — 73030 X-RAY EXAM OF SHOULDER: CPT

## 2024-11-12 PROCEDURE — 6370000000 HC RX 637 (ALT 250 FOR IP): Performed by: PHYSICIAN ASSISTANT

## 2024-11-12 PROCEDURE — 99283 EMERGENCY DEPT VISIT LOW MDM: CPT

## 2024-11-12 PROCEDURE — 73110 X-RAY EXAM OF WRIST: CPT

## 2024-11-12 PROCEDURE — 73130 X-RAY EXAM OF HAND: CPT

## 2024-11-12 RX ORDER — OXYCODONE AND ACETAMINOPHEN 5; 325 MG/1; MG/1
1 TABLET ORAL ONCE
Status: COMPLETED | OUTPATIENT
Start: 2024-11-12 | End: 2024-11-12

## 2024-11-12 RX ADMIN — OXYCODONE HYDROCHLORIDE AND ACETAMINOPHEN 1 TABLET: 5; 325 TABLET ORAL at 21:41

## 2024-11-12 ASSESSMENT — PAIN - FUNCTIONAL ASSESSMENT
PAIN_FUNCTIONAL_ASSESSMENT: 0-10
PAIN_FUNCTIONAL_ASSESSMENT: 0-10

## 2024-11-12 ASSESSMENT — PAIN DESCRIPTION - ORIENTATION
ORIENTATION: LEFT
ORIENTATION: LEFT

## 2024-11-12 ASSESSMENT — PAIN DESCRIPTION - DESCRIPTORS: DESCRIPTORS: ACHING

## 2024-11-12 ASSESSMENT — PAIN SCALES - GENERAL
PAINLEVEL_OUTOF10: 7
PAINLEVEL_OUTOF10: 5

## 2024-11-12 ASSESSMENT — PAIN DESCRIPTION - LOCATION
LOCATION: ARM
LOCATION: ARM

## 2024-11-13 NOTE — ED PROVIDER NOTES
the patient regarding imaging results.  Offered an Ace wrap for comfort but did not feel she needed a splint at this time.  Recommend follow-up primary care doctor or Ortho as needed.  Recommended ibuprofen, Tylenol, rest, and ice.  Patient is in agreement with treatment plan and discharged home in good condition.    Disposition Considerations (include 1 Tests not done, Shared Decision Making, Pt Expectation of Test or Tx.): No results found for this visit on 11/12/24.    I estimate there is LOW risk for COMPARTMENT SYNDROME, DEEP VENOUS THROMBOSIS, SEPTIC ARTHRITIS, TENDON OR NEUROVASCULAR INJURY, thus I consider the discharge disposition reasonable. Maryuri Reveles and I have discussed the diagnosis and risks, and we agree with discharging home to follow-up with their primary doctor or the referral orthopedist. We also discussed returning to the Emergency Department immediately if new or worsening symptoms occur. We have discussed the symptoms which are most concerning (e.g., changing or worsening pain, numbness, weakness) that necessitate immediate return.    Final Impression    1. Left arm pain        Blood pressure (!) 141/97, pulse 82, temperature 98.5 °F (36.9 °C), temperature source Oral, resp. rate 15, height 1.626 m (5' 4\"), weight 88.5 kg (195 lb), SpO2 98%, not currently breastfeeding.          I am the Primary Clinician of Record.    FINAL IMPRESSION      1. Left arm pain          DISPOSITION/PLAN     DISPOSITION Decision To Discharge 11/12/2024 10:44:56 PM      PATIENT REFERRED TO:  [facility]  ED  [address]  [facility] [address] [phone]  Go to   If symptoms worsen    [facility]  [address]  [address]  [facility] [address]  [phone]  Schedule an appointment as soon as possible for a visit       William Spencer DO  [address] [facility] [address] [phone]            DISCHARGE MEDICATIONS:  Discharge Medication List as

## 2024-11-13 NOTE — DISCHARGE INSTRUCTIONS
You may take ibuprofen or Tylenol for pain as needed.  I would also recommend using ice for 20 minutes at a time, 3 times a day.  Please follow-up with Ortho as needed.

## 2025-01-12 ENCOUNTER — APPOINTMENT (OUTPATIENT)
Dept: GENERAL RADIOLOGY | Age: 35
End: 2025-01-12
Payer: COMMERCIAL

## 2025-01-12 ENCOUNTER — HOSPITAL ENCOUNTER (EMERGENCY)
Age: 35
Discharge: HOME | End: 2025-01-12
Payer: COMMERCIAL

## 2025-01-12 VITALS
BODY MASS INDEX: 33.47 KG/M2 | DIASTOLIC BLOOD PRESSURE: 81 MMHG | TEMPERATURE: 98.4 F | OXYGEN SATURATION: 99 % | RESPIRATION RATE: 16 BRPM | WEIGHT: 195 LBS | SYSTOLIC BLOOD PRESSURE: 115 MMHG | HEART RATE: 89 BPM

## 2025-01-12 DIAGNOSIS — M25.562 PAIN OF KNEE AND LOWER LEG, LEFT: ICD-10-CM

## 2025-01-12 DIAGNOSIS — M25.532 ACUTE PAIN OF LEFT WRIST: ICD-10-CM

## 2025-01-12 DIAGNOSIS — M79.642 LEFT HAND PAIN: ICD-10-CM

## 2025-01-12 DIAGNOSIS — M25.462 EFFUSION OF LEFT KNEE JOINT: Primary | ICD-10-CM

## 2025-01-12 DIAGNOSIS — M79.662 PAIN OF KNEE AND LOWER LEG, LEFT: ICD-10-CM

## 2025-01-12 PROCEDURE — 73090 X-RAY EXAM OF FOREARM: CPT

## 2025-01-12 PROCEDURE — 6370000000 HC RX 637 (ALT 250 FOR IP): Performed by: NURSE PRACTITIONER

## 2025-01-12 PROCEDURE — 73110 X-RAY EXAM OF WRIST: CPT

## 2025-01-12 PROCEDURE — 73130 X-RAY EXAM OF HAND: CPT

## 2025-01-12 PROCEDURE — 73560 X-RAY EXAM OF KNEE 1 OR 2: CPT

## 2025-01-12 PROCEDURE — 73590 X-RAY EXAM OF LOWER LEG: CPT

## 2025-01-12 PROCEDURE — 99283 EMERGENCY DEPT VISIT LOW MDM: CPT

## 2025-01-12 RX ORDER — IBUPROFEN 600 MG/1
600 TABLET, FILM COATED ORAL 3 TIMES DAILY PRN
Qty: 15 TABLET | Refills: 0 | Status: SHIPPED | OUTPATIENT
Start: 2025-01-12 | End: 2025-01-17

## 2025-01-12 RX ORDER — ACETAMINOPHEN 325 MG/1
650 TABLET ORAL ONCE
Status: COMPLETED | OUTPATIENT
Start: 2025-01-12 | End: 2025-01-12

## 2025-01-12 RX ORDER — IBUPROFEN 600 MG/1
600 TABLET, FILM COATED ORAL ONCE
Status: COMPLETED | OUTPATIENT
Start: 2025-01-12 | End: 2025-01-12

## 2025-01-12 RX ORDER — ACETAMINOPHEN 500 MG
500 TABLET ORAL 4 TIMES DAILY PRN
Qty: 28 TABLET | Refills: 0 | Status: SHIPPED | OUTPATIENT
Start: 2025-01-12 | End: 2025-01-19

## 2025-01-12 RX ADMIN — IBUPROFEN 600 MG: 600 TABLET, FILM COATED ORAL at 13:00

## 2025-01-12 RX ADMIN — ACETAMINOPHEN 650 MG: 325 TABLET ORAL at 13:00

## 2025-01-12 ASSESSMENT — PAIN SCALES - GENERAL: PAINLEVEL_OUTOF10: 6

## 2025-01-12 ASSESSMENT — PAIN - FUNCTIONAL ASSESSMENT: PAIN_FUNCTIONAL_ASSESSMENT: 0-10

## 2025-01-12 NOTE — DISCHARGE INSTRUCTIONS
Return to the Emergency Department for new or worsening symptoms including, limited to, developing loss sensation in your left arm or left leg, worsening pain not relieved by medications, developing change in eye color or temperature of your fingers or toes on the left, other symptoms/concerns.      Medication as prescribed.  Need for take ibuprofen.  Do not take other anti-inflammatory pain medications by mouth or use anti-inflammatory creams while taking the ibuprofen as this increases your risk for adverse side effects such as bleeding or cardiovascular events.    Utilize Ace wrap for your comfort but do not sleep in the Ace wrap.    Follow-up the emergency family practice clinic in order to establish a PCP for your future nonemergent medical needs and with your orthopedic specialist for symptoms that worsen or fail to improve in the next 1 week.

## 2025-01-12 NOTE — ED PROVIDER NOTES
hand: As interpreted by me and confirmed by radiology did not find no acute abnormality  XR left knee: As interpreted by me and confirmed by radiology identify no acute abnormality.  Joint effusion and soft tissue swelling  XR left radius and ulna: As interpreted by me and confirmed by radiology identifying no acute abnormality          Is this patient to be included in the SEP-1 Core Measure due to severe sepsis or septic shock?   No   Exclusion criteria - the patient is NOT to be included for SEP-1 Core Measure due to:  Infection is not suspected    CONSULTS: (Who and What was discussed)    Discussion with Other Profesionals : None    Social Determinants : None    Records Reviewed : None    CC/HPI Summary, DDx, ED Course, and Reassessment: Patient presents to the emergency department status post she experienced a mechanical slip and fall on last evening at 1900 hrs. and injured her left hand, left wrist, left knee, left lower leg.  X-rays negative for fracture or dislocation.  Patient placed in Ace wrap to the left knee to joint effusion and soft tissue swelling.  Patient instructed to utilize Ace wrap for comfort but to not sleep in the Ace wrap.  Utilize RICE.  Medication as prescribed and follow-up as an outpatient.  Strict return precautions.  Patient verbalized understanding and is agreeable with plan for discharge and follow-up.    Disposition Considerations (include 1 Tests not done, Shared Decision Making, Pt Expectation of Test or Tx.):   Appropriate for outpatient management -[facility] residency practice      I am the Primary Clinician of Record.    FINAL IMPRESSION        ICD-10-CM    1. Effusion of left knee joint  M25.462 [facility] (No PCP) - [place]      2. Acute pain of left wrist  M25.532 [facility] (No PCP) - [place]      3. Left hand pain  M79.642 [facility] (No PCP) - [place]

## 2025-01-14 ASSESSMENT — ENCOUNTER SYMPTOMS
SORE THROAT: 0
SHORTNESS OF BREATH: 0
NAUSEA: 0
COUGH: 0
EYE PAIN: 0
DIARRHEA: 0
BACK PAIN: 0
ABDOMINAL PAIN: 0
ANAL BLEEDING: 0
VOMITING: 0

## 2025-01-31 ENCOUNTER — OFFICE VISIT (OUTPATIENT)
Age: 35
End: 2025-01-31

## 2025-01-31 VITALS
TEMPERATURE: 97.8 F | DIASTOLIC BLOOD PRESSURE: 66 MMHG | OXYGEN SATURATION: 99 % | HEART RATE: 94 BPM | SYSTOLIC BLOOD PRESSURE: 118 MMHG

## 2025-01-31 DIAGNOSIS — J01.01 ACUTE RECURRENT MAXILLARY SINUSITIS: Primary | ICD-10-CM

## 2025-01-31 DIAGNOSIS — R05.1 ACUTE COUGH: ICD-10-CM

## 2025-01-31 DIAGNOSIS — Z75.8 DOES NOT HAVE PRIMARY CARE PROVIDER: ICD-10-CM

## 2025-01-31 RX ORDER — BENZONATATE 100 MG/1
100 CAPSULE ORAL 3 TIMES DAILY PRN
Qty: 20 CAPSULE | Refills: 0 | Status: SHIPPED | OUTPATIENT
Start: 2025-01-31

## 2025-01-31 RX ORDER — CEFDINIR 300 MG/1
300 CAPSULE ORAL 2 TIMES DAILY
Qty: 20 CAPSULE | Refills: 0 | Status: SHIPPED | OUTPATIENT
Start: 2025-01-31 | End: 2025-02-10

## 2025-01-31 ASSESSMENT — ENCOUNTER SYMPTOMS
NAUSEA: 0
VOMITING: 0
SINUS PAIN: 1
SHORTNESS OF BREATH: 0
TROUBLE SWALLOWING: 0
SORE THROAT: 0
SINUS PRESSURE: 1
COUGH: 1

## 2025-01-31 NOTE — PROGRESS NOTES
Maryuri Reveles (:  1990) is a 34 y.o. female,Established patient, here for evaluation of the following chief complaint(s):  Facial Pain (Pt states sinus pressure in face and eyes/Drainage in back of throat and coughing from drainage that is hurting her chest/X several days)      ASSESSMENT/PLAN:    ICD-10-CM    1. Acute recurrent maxillary sinusitis  J01.01 cefdinir (OMNICEF) 300 MG capsule      2. Acute cough  R05.1 benzonatate (TESSALON) 100 MG capsule      3. Does not have primary care provider  Z75.8 [facility]         No history of seasonal allergies   Still could be viral but no exposures and was treated with cefdinir last time (same symptoms)  and has history of acute   sinus infections.....     Keep hydrated, tylenol or ibuprofen (if no contraindications) as needed if pain or fever.. Take medications as prescribed.. follow up with PCP in 7- days if not better   Go to ER  if symptoms worse/feeling worse or has new symptoms or concerns,  if fever/chills, increase shortness of breath, increase congestion or wheezing despite medications, if associated with chest pain, nausea/vomiting, dizzy/ light-headed sensation.    SUBJECTIVE/OBJECTIVE:  Patient presents with:  Facial Pain: Pt states sinus pressure in face and eyes  Drainage in back of throat and coughing from drainage that is hurting her chest  X several days    Patient reports symptoms same as last time when treated for sinus infection         History provided by:  Patient   used: No        Vitals:    25 0931 25 1002   BP: 118/66    Pulse: (!) 124 94   Temp: 97.8 °F (36.6 °C)    TempSrc: Infrared    SpO2: 99%        Review of Systems   Constitutional:  Negative for fever.   HENT:  Positive for congestion, ear pain, postnasal drip, sinus pressure and sinus pain. Negative for ear discharge, sore throat and trouble swallowing.    Respiratory:  Positive for cough. Negative for shortness

## 2025-01-31 NOTE — PATIENT INSTRUCTIONS
Keep hydrated, tylenol or ibuprofen (if no contraindications) as needed if pain or fever.. Take medications as prescribed.. follow up with PCP in 7- days if not better   Go to ER  if symptoms worse/feeling worse or has new symptoms or concerns,  if fever/chills, increase shortness of breath, increase congestion or wheezing despite medications, if associated with chest pain, nausea/vomiting, dizzy/ light-headed sensation.

## 2025-03-10 ENCOUNTER — HOSPITAL ENCOUNTER (EMERGENCY)
Age: 35
Discharge: HOME | End: 2025-03-10
Attending: EMERGENCY MEDICINE
Payer: COMMERCIAL

## 2025-03-10 ENCOUNTER — APPOINTMENT (OUTPATIENT)
Dept: CT IMAGING | Age: 35
End: 2025-03-10

## 2025-03-10 VITALS
OXYGEN SATURATION: 98 % | DIASTOLIC BLOOD PRESSURE: 78 MMHG | SYSTOLIC BLOOD PRESSURE: 120 MMHG | BODY MASS INDEX: 46.1 KG/M2 | WEIGHT: 270 LBS | HEIGHT: 64 IN | RESPIRATION RATE: 20 BRPM | HEART RATE: 87 BPM | TEMPERATURE: 98.6 F

## 2025-03-10 DIAGNOSIS — R56.9 SEIZURE (HCC): Primary | ICD-10-CM

## 2025-03-10 LAB
ALBUMIN SERPL-MCNC: 4.1 G/DL (ref 3.4–5)
ALBUMIN/GLOB SERPL: 1.3 {RATIO} (ref 1.1–2.2)
ALP SERPL-CCNC: 76 U/L (ref 40–129)
ALT SERPL-CCNC: 22 U/L (ref 10–40)
ANION GAP SERPL CALCULATED.3IONS-SCNC: 20 MMOL/L (ref 3–16)
AST SERPL-CCNC: 26 U/L (ref 15–37)
BASOPHILS # BLD: 0.1 K/UL (ref 0–0.2)
BASOPHILS NFR BLD: 0.4 %
BILIRUB SERPL-MCNC: <0.2 MG/DL (ref 0–1)
BUN SERPL-MCNC: 15 MG/DL (ref 7–20)
CALCIUM SERPL-MCNC: 9.1 MG/DL (ref 8.3–10.6)
CHLORIDE SERPL-SCNC: 103 MMOL/L (ref 99–110)
CO2 SERPL-SCNC: 18 MMOL/L (ref 21–32)
CREAT SERPL-MCNC: 0.9 MG/DL (ref 0.6–1.1)
DEPRECATED RDW RBC AUTO: 14.3 % (ref 12.4–15.4)
EOSINOPHIL # BLD: 0.1 K/UL (ref 0–0.6)
EOSINOPHIL NFR BLD: 0.8 %
GFR SERPLBLD CREATININE-BSD FMLA CKD-EPI: 85 ML/MIN/{1.73_M2}
GLUCOSE SERPL-MCNC: 83 MG/DL (ref 70–99)
HCG SERPL QL: NEGATIVE
HCT VFR BLD AUTO: 38.9 % (ref 36–48)
HGB BLD-MCNC: 13 G/DL (ref 12–16)
LYMPHOCYTES # BLD: 3.4 K/UL (ref 1–5.1)
LYMPHOCYTES NFR BLD: 24.1 %
MAGNESIUM SERPL-MCNC: 2.1 MG/DL (ref 1.8–2.4)
MCH RBC QN AUTO: 29.3 PG (ref 26–34)
MCHC RBC AUTO-ENTMCNC: 33.5 G/DL (ref 31–36)
MCV RBC AUTO: 87.4 FL (ref 80–100)
MONOCYTES # BLD: 0.9 K/UL (ref 0–1.3)
MONOCYTES NFR BLD: 6.1 %
NEUTROPHILS # BLD: 9.6 K/UL (ref 1.7–7.7)
NEUTROPHILS NFR BLD: 68.6 %
PLATELET # BLD AUTO: 304 K/UL (ref 135–450)
PMV BLD AUTO: 9.4 FL (ref 5–10.5)
POTASSIUM SERPL-SCNC: 3 MMOL/L (ref 3.5–5.1)
PROT SERPL-MCNC: 7.2 G/DL (ref 6.4–8.2)
RBC # BLD AUTO: 4.45 M/UL (ref 4–5.2)
SODIUM SERPL-SCNC: 141 MMOL/L (ref 136–145)
WBC # BLD AUTO: 14 K/UL (ref 4–11)

## 2025-03-10 PROCEDURE — 85025 COMPLETE CBC W/AUTO DIFF WBC: CPT

## 2025-03-10 PROCEDURE — 6370000000 HC RX 637 (ALT 250 FOR IP): Performed by: EMERGENCY MEDICINE

## 2025-03-10 PROCEDURE — 6360000002 HC RX W HCPCS: Performed by: EMERGENCY MEDICINE

## 2025-03-10 PROCEDURE — 93005 ELECTROCARDIOGRAM TRACING: CPT | Performed by: EMERGENCY MEDICINE

## 2025-03-10 PROCEDURE — 84703 CHORIONIC GONADOTROPIN ASSAY: CPT

## 2025-03-10 PROCEDURE — 83735 ASSAY OF MAGNESIUM: CPT

## 2025-03-10 PROCEDURE — 70450 CT HEAD/BRAIN W/O DYE: CPT

## 2025-03-10 PROCEDURE — 96374 THER/PROPH/DIAG INJ IV PUSH: CPT

## 2025-03-10 PROCEDURE — 99284 EMERGENCY DEPT VISIT MOD MDM: CPT

## 2025-03-10 PROCEDURE — 80053 COMPREHEN METABOLIC PANEL: CPT

## 2025-03-10 RX ORDER — DIPHENHYDRAMINE HYDROCHLORIDE 50 MG/ML
12.5 INJECTION INTRAMUSCULAR; INTRAVENOUS ONCE
Status: DISCONTINUED | OUTPATIENT
Start: 2025-03-10 | End: 2025-03-10 | Stop reason: HOSPADM

## 2025-03-10 RX ORDER — LORAZEPAM 2 MG/ML
1 INJECTION INTRAMUSCULAR ONCE
Status: COMPLETED | OUTPATIENT
Start: 2025-03-10 | End: 2025-03-10

## 2025-03-10 RX ORDER — PROCHLORPERAZINE EDISYLATE 5 MG/ML
10 INJECTION INTRAMUSCULAR; INTRAVENOUS ONCE
Status: DISCONTINUED | OUTPATIENT
Start: 2025-03-10 | End: 2025-03-10 | Stop reason: HOSPADM

## 2025-03-10 RX ORDER — ACETAMINOPHEN 500 MG
1000 TABLET ORAL
Status: COMPLETED | OUTPATIENT
Start: 2025-03-10 | End: 2025-03-10

## 2025-03-10 RX ADMIN — LORAZEPAM 1 MG: 2 INJECTION INTRAMUSCULAR; INTRAVENOUS at 18:42

## 2025-03-10 RX ADMIN — ACETAMINOPHEN 1000 MG: 500 TABLET ORAL at 18:41

## 2025-03-10 ASSESSMENT — PAIN SCALES - GENERAL
PAINLEVEL_OUTOF10: 3
PAINLEVEL_OUTOF10: 1

## 2025-03-10 ASSESSMENT — PAIN - FUNCTIONAL ASSESSMENT: PAIN_FUNCTIONAL_ASSESSMENT: 0-10

## 2025-03-10 NOTE — ED NOTES
Patient placed in Seizure precautions.   Large Bore IV placed, Oxygen and suction prepared at bedside.    Side rails padded with Seizure pads

## 2025-03-10 NOTE — ED PROVIDER NOTES
management plan:  Labs, head CT, observation.  Will provide headache control, and Ativan to prevent recurrent seizure    Social Determinants Significantly Affecting Health : None    ED treatments included:  ED Medication Orders (From admission, onward)      Start Ordered     Status Ordering Provider    03/10/25 2030 03/10/25 2015  diphenhydrAMINE (BENADRYL) injection 12.5 mg  ONCE         Last MAR action: Not Given - by KENNEY COSME on 03/10/25 at 2023 ERWIN SANTOS    03/10/25 2000 03/10/25 1949  prochlorperazine (COMPAZINE) injection 10 mg  ONCE         Last MAR action: Not Given - by KENNEY COSME on 03/10/25 at 2023 ERWIN SANTOS    03/10/25 1845 03/10/25 1832  acetaminophen (TYLENOL) tablet 1,000 mg  NOW         Last MAR action: Given - by KENNEY COSME on 03/10/25 at 1841 ERWIN SANTOS    03/10/25 1845 03/10/25 1832  LORazepam (ATIVAN) injection 1 mg  ONCE         Last MAR action: Given - by KENNEY COSME on 03/10/25 at 1842 ERWIN SANTOS            Bedside Ultrasound  No results found.     Radiology  CT Head W/O Contrast  Result Date: 3/10/2025  EXAM: CT HEAD WO CONTRAST INDICATION: seizure; COMPARISON: Head CT 5/23/2023 TECHNIQUE: Axial CT imaging of the head. Axial images and multiplanar reformatted images reviewed.  Individualized dose optimization technique was used in order to meet ALARA standards for radiation dose reduction.  In addition to vendor specific dose reduction algorithms, the dose reduction techniques vary based on the specific scanner utilized but frequently include automated exposure control, adjustment of the mA and/or kV according to patient size, and use of iterative reconstruction technique. CONTRAST: None. FINDINGS: No acute intracranial hemorrhage, mass effect, or extraaxial collection. Age appropriate ventricular and sulcal size. Gray-white differentiation normal. No acute calvarial abnormalities.  No orbital abnormality. Mucous retention cyst in the right maxillary

## 2025-03-11 NOTE — DISCHARGE INSTRUCTIONS
You were seen after a seizure.  Your exam, and CT were reassuring.  Your clinical presentation is consistent with a breakthrough seizure.  Continue take your medications as prescribed.  Follow-up with your primary care doctor and neurologist.  Return to the ER with any worsening symptoms, recurrent seizure, or new concerns

## 2025-03-12 LAB
EKG ATRIAL RATE: 96 BPM
EKG DIAGNOSIS: NORMAL
EKG P AXIS: 47 DEGREES
EKG P-R INTERVAL: 208 MS
EKG Q-T INTERVAL: 362 MS
EKG QRS DURATION: 90 MS
EKG QTC CALCULATION (BAZETT): 457 MS
EKG R AXIS: 75 DEGREES
EKG T AXIS: 42 DEGREES
EKG VENTRICULAR RATE: 96 BPM

## 2025-03-12 PROCEDURE — 93010 ELECTROCARDIOGRAM REPORT: CPT | Performed by: INTERNAL MEDICINE
